# Patient Record
Sex: MALE | Race: WHITE | Employment: FULL TIME | URBAN - METROPOLITAN AREA
[De-identification: names, ages, dates, MRNs, and addresses within clinical notes are randomized per-mention and may not be internally consistent; named-entity substitution may affect disease eponyms.]

---

## 2017-01-06 ENCOUNTER — MYC MEDICAL ADVICE (OUTPATIENT)
Dept: FAMILY MEDICINE | Facility: CLINIC | Age: 42
End: 2017-01-06

## 2017-01-23 ENCOUNTER — TRANSFERRED RECORDS (OUTPATIENT)
Dept: HEALTH INFORMATION MANAGEMENT | Facility: CLINIC | Age: 42
End: 2017-01-23

## 2017-01-28 DIAGNOSIS — K59.00 CONSTIPATION, UNSPECIFIED CONSTIPATION TYPE: Primary | ICD-10-CM

## 2017-01-28 NOTE — TELEPHONE ENCOUNTER
polyethylene glycol (MIRALAX/GLYCOLAX) powder      Last Written Prescription Date: 1/1/16  Last Fill Quantity: 527g,  # refills: 1   Last Office Visit with [facility], [facility] or [facility] prescribing provider: 12/9/16

## 2017-01-31 RX ORDER — POLYETHYLENE GLYCOL 3350 17 G/17G
POWDER, FOR SOLUTION ORAL
Qty: 527 G | Refills: 0 | Status: SHIPPED | OUTPATIENT
Start: 2017-01-31 | End: 2017-11-06

## 2017-01-31 NOTE — TELEPHONE ENCOUNTER
Prescription approved per [facility] Refill Protocol.  Anne-Marie Metzger RN

## 2017-02-02 DIAGNOSIS — E11.9 TYPE 2 DIABETES MELLITUS WITHOUT COMPLICATION, WITHOUT LONG-TERM CURRENT USE OF INSULIN (H): Primary | ICD-10-CM

## 2017-02-02 NOTE — TELEPHONE ENCOUNTER
lisinopril (PRINIVIL/ZESTRIL) 2.5 MG tablet      Last Written Prescription Date: 12/19/16  Last Fill Quantity: 30, # refills: 1  Last Office Visit with G, P or [facility] prescribing provider: 12/9/16       POTASSIUM   Date Value Ref Range Status   12/08/2016 4.0 3.4 - 5.3 mmol/L Final     CREATININE   Date Value Ref Range Status   12/08/2016 0.90 0.66 - 1.25 mg/dL Final     BP Readings from Last 3 Encounters:   12/09/16 118/80   10/06/16 130/80   09/30/16 138/82

## 2017-02-06 RX ORDER — LISINOPRIL 2.5 MG/1
2.5 TABLET ORAL DAILY
Qty: 30 TABLET | Refills: 0 | Status: SHIPPED | OUTPATIENT
Start: 2017-02-06 | End: 2017-03-13

## 2017-02-06 NOTE — TELEPHONE ENCOUNTER
Medication is being filled for 1 time refill only due to:  Patient needs to be seen because due for physical in January 2017.   Paloma Hung RN

## 2017-03-07 ENCOUNTER — MYC MEDICAL ADVICE (OUTPATIENT)
Dept: FAMILY MEDICINE | Facility: CLINIC | Age: 42
End: 2017-03-07

## 2017-03-07 ENCOUNTER — OFFICE VISIT (OUTPATIENT)
Dept: FAMILY MEDICINE | Facility: CLINIC | Age: 42
End: 2017-03-07
Payer: COMMERCIAL

## 2017-03-07 VITALS
WEIGHT: 167.5 LBS | RESPIRATION RATE: 16 BRPM | HEART RATE: 84 BPM | BODY MASS INDEX: 26.92 KG/M2 | TEMPERATURE: 98.1 F | OXYGEN SATURATION: 98 % | HEIGHT: 66 IN | SYSTOLIC BLOOD PRESSURE: 126 MMHG | DIASTOLIC BLOOD PRESSURE: 76 MMHG

## 2017-03-07 DIAGNOSIS — H10.31 ACUTE CONJUNCTIVITIS OF RIGHT EYE, UNSPECIFIED ACUTE CONJUNCTIVITIS TYPE: Primary | ICD-10-CM

## 2017-03-07 PROCEDURE — 99213 OFFICE O/P EST LOW 20 MIN: CPT | Performed by: FAMILY MEDICINE

## 2017-03-07 RX ORDER — TOBRAMYCIN 3 MG/ML
1 SOLUTION/ DROPS OPHTHALMIC 4 TIMES DAILY
Qty: 5 ML | Refills: 0 | Status: SHIPPED | OUTPATIENT
Start: 2017-03-07 | End: 2017-03-20

## 2017-03-07 ASSESSMENT — PAIN SCALES - GENERAL: PAINLEVEL: NO PAIN (0)

## 2017-03-07 NOTE — PROGRESS NOTES
"  SUBJECTIVE:                                                    Fred Núñez is a 41 year old male who presents to clinic today for the following health issues:      Acute Illness   Acute illness concerns: R eye  Onset: this morning    Fever: no    Chills/Sweats: no    Headache (location?): no    Sinus Pressure:no    Conjunctivitis:  YES- R eye (itching, no redness/puss)    Ear Pain: no    Rhinorrhea: no    Congestion: no    Sore Throat: no     Cough: no    Wheeze: no    Decreased Appetite: no    Nausea: no    Vomiting: no    Diarrhea:  no    Dysuria/Freq.: no    Fatigue/Achiness: None    Sick/Strep Exposure: YES- wife last week, child yesterday were dx'd with pink eye     Therapies Tried and outcome: None    SUBJECTIVE:  Here today with the above. Both wife and daughter have been diagnosed with pinkeye in the last few days. Patient says he senses no cold symptoms, etc. coming on, but starting this morning his right eye has been feeling itchy and had a little bit of mattering. He is tried to keep his hands away so he doesn't worsen things.    Review of systems otherwise negative.  Past medical, family, and social history reviewed and updated in chart.    OBJECTIVE:  /76 (BP Location: Right arm, Patient Position: Right side, Cuff Size: Adult Regular)  Pulse 84  Temp 98.1  F (36.7  C) (Oral)  Resp 16  Ht 1.67 m (5' 5.75\")  Wt 76 kg (167 lb 8 oz)  SpO2 98%  BMI 27.24 kg/m2  Alert, pleasant, upbeat, and in no apparent discomfort.  Left eye normal in appearance  Right eye is slightly puffy and there is a slight pink tinge to the conjunctiva. Not a lot of mattering noted  Ears normal. Throat and pharynx normal. Neck supple. No adenopathy or masses in the neck or supraclavicular regions. Sinuses non tender.  S1 and S2 normal, no murmurs, clicks, gallops or rubs. Regular rate and rhythm. Chest is clear; no wheezes or rales. No edema or JVD.  Past labs reviewed with the patient.     ASSESSMENT / PLAN:  (H10.31) " Acute conjunctivitis of right eye, unspecified acute conjunctivitis type  (primary encounter diagnosis)  Comment: Discussed mechanism of action of the proposed medication, as well as potential effects, both good and bad.  Patient expressed understanding and agreed with treatment.   Plan: tobramycin (TOBREX) 0.3 % ophthalmic solution            Follow up as needed   BURAK Baldwin MD    (Chart documentation completed in part with Dragon voice-recognition software.  Even though reviewed some grammatical, spelling, and word errors may remain.)

## 2017-03-07 NOTE — MR AVS SNAPSHOT
After Visit Summary   3/7/2017    Fred Núñez    MRN: [id number]           Patient Information     Date Of Birth          1975        Visit Information        Provider Department      3/7/2017 11:40 AM Dolores Baldwin MD [facility]        Today's Diagnoses     Acute conjunctivitis of right eye, unspecified acute conjunctivitis type    -  1       Follow-ups after your visit        Follow-up notes from your care team     Return if symptoms worsen or fail to improve.      Who to contact     If you have questions or need follow up information about today's clinic visit or your schedule please contact [facility] directly at [phone].  Normal or non-critical lab and imaging results will be communicated to you by MyChart, letter or phone within 4 business days after the clinic has received the results. If you do not hear from us within 7 days, please contact the clinic through MyChart or phone. If you have a critical or abnormal lab result, we will notify you by phone as soon as possible.  Submit refill requests through [employer] or call your pharmacy and they will forward the refill request to us. Please allow 3 business days for your refill to be completed.          Additional Information About Your Visit        MyChart Information     [employer] gives you secure access to your electronic health record. If you see a primary care provider, you can also send messages to your care team and make appointments. If you have questions, please call your primary care clinic.  If you do not have a primary care provider, please call [phone] and they will assist you.        Care EveryWhere ID     This is your Care EveryWhere ID. This could be used by other organizations to access your [facility] medical records  [id number]        Your Vitals Were     Pulse Temperature Respirations Height Pulse Oximetry BMI (Body Mass Index)    84 98.1  F (36.7  C) (Oral) 16 1.67 m  "(5' 5.75\") 98% 27.24 kg/m2       Blood Pressure from Last 3 Encounters:   03/07/17 126/76   12/09/16 118/80   10/06/16 130/80    Weight from Last 3 Encounters:   03/07/17 76 kg (167 lb 8 oz)   12/09/16 76.7 kg (169 lb 1.6 oz)   11/02/16 79.6 kg (175 lb 8 oz)              Today, you had the following     No orders found for display         Today's Medication Changes          These changes are accurate as of: 3/7/17 12:12 PM.  If you have any questions, ask your nurse or doctor.               Start taking these medicines.        Dose/Directions    tobramycin 0.3 % ophthalmic solution   Commonly known as:  TOBREX   Used for:  Acute conjunctivitis of right eye, unspecified acute conjunctivitis type   Started by:  Dolores Baldwin MD        Dose:  1 drop   Place 1 drop into the right eye 4 times daily   Quantity:  5 mL   Refills:  0         Stop taking these medicines if you haven't already. Please contact your care team if you have questions.     fluticasone 50 MCG/ACT spray   Commonly known as:  FLONASE   Stopped by:  Dolores Baldwin MD                Where to get your medicines      These medications were sent to [facility]/pharmacy #[id number] - [place], [place] - [address] AT [address] [address]     Phone:  [phone]     tobramycin 0.3 % ophthalmic solution                Primary Care Provider Office Phone #    [facility] [phone]       No address on file        Thank you!     Thank you for choosing [facility]  for your care. Our goal is always to provide you with excellent care. Hearing back from our patients is one way we can continue to improve our services. Please take a few minutes to complete the written survey that you may receive in the mail after your visit with us. Thank you!             Your Updated Medication List - Protect others around you: Learn how to safely use, store and throw away your " medicines at www.disposemymeds.org.          This list is accurate as of: 3/7/17 12:12 PM.  Always use your most recent med list.                   Brand Name Dispense Instructions for use    amLODIPine 10 MG tablet    NORVASC    45 tablet    Take 0.5 tablets (5 mg) by mouth daily       atorvastatin 10 MG tablet    LIPITOR    90 tablet    TAKE 1 TABLET (10 MG) BY MOUTH AT BEDTIME       blood glucose monitoring lancets     2 Box    Use to test blood sugar 2 times daily or as directed.       blood glucose monitoring meter device kit     1 kit    Use to test blood sugars 2 times daily or as directed.       blood glucose monitoring test strip    ACCU-CHEK SMARTVIEW    2 Box    Use to test blood sugar 2 times daily or as directed.       lisinopril 2.5 MG tablet    PRINIVIL/Zestril    30 tablet    Take 1 tablet (2.5 mg) by mouth daily       Magnesium-Potassium 250-100 MG Tabs          metFORMIN 500 MG tablet    GLUCOPHAGE    180 tablet    Take 1 tablet (500 mg) by mouth 2 times daily (with meals)       Multi-vitamin Tabs tablet      Take 1 tablet by mouth daily       order for DME     1 each    Equipment being ordered: Blood pressure monitor. Check blood pressure every morning. Goal blood pressure: systolic less than 125; diastolic blood pressure less than 75.       polyethylene glycol powder    MIRALAX/GLYCOLAX    527 g    TAKE 17 G BY MOUTH DAILY       tobramycin 0.3 % ophthalmic solution    TOBREX    5 mL    Place 1 drop into the right eye 4 times daily       triamterene-hydrochlorothiazide 37.5-25 MG per capsule    DYAZIDE    90 capsule    Take 1 capsule by mouth daily

## 2017-03-07 NOTE — NURSING NOTE
"Chief Complaint   Patient presents with     Conjunctivitis       Initial /76 (BP Location: Right arm, Patient Position: Right side, Cuff Size: Adult Regular)  Pulse 84  Temp 98.1  F (36.7  C) (Oral)  Resp 16  Ht 1.67 m (5' 5.75\")  Wt 76 kg (167 lb 8 oz)  SpO2 98%  BMI 27.24 kg/m2 Estimated body mass index is 27.24 kg/(m^2) as calculated from the following:    Height as of this encounter: 1.67 m (5' 5.75\").    Weight as of this encounter: 76 kg (167 lb 8 oz).  Medication Reconciliation: complete     Will Girish WADDELL      "

## 2017-03-11 DIAGNOSIS — K59.00 CONSTIPATION, UNSPECIFIED CONSTIPATION TYPE: ICD-10-CM

## 2017-03-12 NOTE — TELEPHONE ENCOUNTER
polyethylene glycol       Last Written Prescription Date: 1/31/17  Last Fill Quantity: 527g,  # refills: 0   Last Office Visit with G, P or [facility] prescribing provider: 3/7/17

## 2017-03-13 DIAGNOSIS — E11.9 TYPE 2 DIABETES MELLITUS WITHOUT COMPLICATION, WITHOUT LONG-TERM CURRENT USE OF INSULIN (H): ICD-10-CM

## 2017-03-13 NOTE — TELEPHONE ENCOUNTER
lisinopril     Last Written Prescription Date: 02/06/17  Last Fill Quantity: 30, # refills: 0  Last Office Visit with [facility], [facility] or [facility] prescribing provider: 3/07/17 Dr. Baldwin      Potassium   Date Value Ref Range Status   12/08/2016 4.0 3.4 - 5.3 mmol/L Final     Creatinine   Date Value Ref Range Status   12/08/2016 0.90 0.66 - 1.25 mg/dL Final     BP Readings from Last 3 Encounters:   03/07/17 126/76   12/09/16 118/80   10/06/16 130/80     SANTIAGO Uribe (R)

## 2017-03-14 RX ORDER — POLYETHYLENE GLYCOL 3350 17 G/17G
POWDER, FOR SOLUTION ORAL
Qty: 527 G | Refills: 2 | Status: SHIPPED | OUTPATIENT
Start: 2017-03-14 | End: 2017-03-20

## 2017-03-14 RX ORDER — LISINOPRIL 2.5 MG/1
2.5 TABLET ORAL DAILY
Qty: 30 TABLET | Refills: 8 | Status: SHIPPED | OUTPATIENT
Start: 2017-03-14 | End: 2017-11-22

## 2017-03-14 NOTE — TELEPHONE ENCOUNTER
Prescription approved per [facility] Refill Protocol.  Anne-Marie Metzger RN

## 2017-03-20 ENCOUNTER — OFFICE VISIT (OUTPATIENT)
Dept: FAMILY MEDICINE | Facility: CLINIC | Age: 42
End: 2017-03-20
Payer: COMMERCIAL

## 2017-03-20 VITALS
DIASTOLIC BLOOD PRESSURE: 78 MMHG | HEIGHT: 66 IN | BODY MASS INDEX: 26.89 KG/M2 | HEART RATE: 82 BPM | OXYGEN SATURATION: 97 % | RESPIRATION RATE: 16 BRPM | TEMPERATURE: 97.8 F | WEIGHT: 167.3 LBS | SYSTOLIC BLOOD PRESSURE: 116 MMHG

## 2017-03-20 DIAGNOSIS — J01.90 ACUTE NON-RECURRENT SINUSITIS, UNSPECIFIED LOCATION: Primary | ICD-10-CM

## 2017-03-20 PROCEDURE — 99213 OFFICE O/P EST LOW 20 MIN: CPT | Performed by: FAMILY MEDICINE

## 2017-03-20 RX ORDER — AMOXICILLIN 875 MG
875 TABLET ORAL 2 TIMES DAILY
Qty: 20 TABLET | Refills: 0 | Status: SHIPPED | OUTPATIENT
Start: 2017-03-20 | End: 2017-11-06

## 2017-03-20 ASSESSMENT — PAIN SCALES - GENERAL: PAINLEVEL: NO PAIN (0)

## 2017-03-20 NOTE — NURSING NOTE
"Chief Complaint   Patient presents with     Sinus Problem       Initial /78 (BP Location: Right arm, Patient Position: Right side, Cuff Size: Adult Regular)  Pulse 82  Temp 97.8  F (36.6  C) (Oral)  Resp 16  Ht 1.67 m (5' 5.75\")  Wt 75.9 kg (167 lb 4.8 oz)  SpO2 97%  BMI 27.21 kg/m2 Estimated body mass index is 27.21 kg/(m^2) as calculated from the following:    Height as of this encounter: 1.67 m (5' 5.75\").    Weight as of this encounter: 75.9 kg (167 lb 4.8 oz).  Medication Reconciliation: complete     Will Girish WADDELL      "

## 2017-03-20 NOTE — PROGRESS NOTES
"  SUBJECTIVE:                                                    Fred Núñez is a 41 year old male who presents to clinic today for the following health issues:      Acute Illness   Acute illness concerns: sinus, congestion  Onset: 1 week    Fever: YES    Chills/Sweats: no    Headache (location?): YES    Sinus Pressure:YES    Conjunctivitis:  no    Ear Pain: no    Rhinorrhea: YES    Congestion: YES    Sore Throat: YES- Fri,Sat     Cough: YES-non-productive    Wheeze: no    Decreased Appetite: YES    Nausea: no    Vomiting: no    Diarrhea:  no    Dysuria/Freq.: no    Fatigue/Achiness: no     Sick/Strep Exposure: YES- child - recently joined , wife has bronchitis     Therapies Tried and outcome: Ibuprofen, Cough drops -    SUBJECTIVE:  Here today with the above symptoms that started a week ago.  Wife and daughter both on amoxicillin (ear, sinus).  Fever initially - not now.  Lot's of sinus pressure, postnasal drip, cough.    Review of systems otherwise negative.  Past medical, family, and social history reviewed and updated in chart.    OBJECTIVE:  /78 (BP Location: Right arm, Patient Position: Right side, Cuff Size: Adult Regular)  Pulse 82  Temp 97.8  F (36.6  C) (Oral)  Resp 16  Ht 1.67 m (5' 5.75\")  Wt 75.9 kg (167 lb 4.8 oz)  SpO2 97%  BMI 27.21 kg/m2  Alert, pleasant, upbeat, and in no apparent discomfort.  Ears normal. Throat and pharynx normal. Neck supple. No adenopathy or masses in the neck or supraclavicular regions. Sinuses non tender.  S1 and S2 normal, no murmurs, clicks, gallops or rubs. Regular rate and rhythm. Chest is clear; no wheezes or rales. No edema or JVD.   Past labs reviewed with the patient.     ASSESSMENT / PLAN:  (J01.90) Acute non-recurrent sinusitis, unspecified location  (primary encounter diagnosis)  Comment: Discussed mechanism of action of the proposed medication, as well as potential effects, both good and bad.  Patient expressed understanding and agreed with " treatment.   Plan: amoxicillin (AMOXIL) 875 MG tablet            Follow up as needed   S. Andrez Baldwin MD    (Chart documentation completed in part with Dragon voice-recognition software.  Even though reviewed some grammatical, spelling, and word errors may remain.)

## 2017-03-20 NOTE — MR AVS SNAPSHOT
"              After Visit Summary   3/20/2017    Fred Núñez    MRN: [id number]           Patient Information     Date Of Birth          1975        Visit Information        Provider Department      3/20/2017 1:00 PM Dolores Baldwin MD [facility]        Today's Diagnoses     Acute non-recurrent sinusitis, unspecified location    -  1       Follow-ups after your visit        Follow-up notes from your care team     Return if symptoms worsen or fail to improve.      Who to contact     If you have questions or need follow up information about today's clinic visit or your schedule please contact [facility] directly at [phone].  Normal or non-critical lab and imaging results will be communicated to you by MyChart, letter or phone within 4 business days after the clinic has received the results. If you do not hear from us within 7 days, please contact the clinic through [employer] or phone. If you have a critical or abnormal lab result, we will notify you by phone as soon as possible.  Submit refill requests through [employer] or call your pharmacy and they will forward the refill request to us. Please allow 3 business days for your refill to be completed.          Additional Information About Your Visit        MyChart Information     [employer] gives you secure access to your electronic health record. If you see a primary care provider, you can also send messages to your care team and make appointments. If you have questions, please call your primary care clinic.  If you do not have a primary care provider, please call [phone] and they will assist you.        Care EveryWhere ID     This is your Care EveryWhere ID. This could be used by other organizations to access your [facility] medical records  [id number]        Your Vitals Were     Pulse Temperature Respirations Height Pulse Oximetry BMI (Body Mass Index)    82 97.8  F (36.6  C) (Oral) 16 1.67 m (5' 5.75\") 97% 27.21 " kg/m2       Blood Pressure from Last 3 Encounters:   03/20/17 116/78   03/07/17 126/76   12/09/16 118/80    Weight from Last 3 Encounters:   03/20/17 75.9 kg (167 lb 4.8 oz)   03/07/17 76 kg (167 lb 8 oz)   12/09/16 76.7 kg (169 lb 1.6 oz)              Today, you had the following     No orders found for display         Today's Medication Changes          These changes are accurate as of: 3/20/17  1:23 PM.  If you have any questions, ask your nurse or doctor.               Start taking these medicines.        Dose/Directions    amoxicillin 875 MG tablet   Commonly known as:  AMOXIL   Used for:  Acute non-recurrent sinusitis, unspecified location   Started by:  Dolores Baldwin MD        Dose:  875 mg   Take 1 tablet (875 mg) by mouth 2 times daily   Quantity:  20 tablet   Refills:  0            Where to get your medicines      These medications were sent to [facility]/pharmacy #[id number] - [place] [id number]     Phone:  [phone]     amoxicillin 875 MG tablet                Primary Care Provider Office Phone # Fax #    Dolores Baldwin -[phone]       [address] [address]        Thank you!     Thank you for choosing [facility]  for your care. Our goal is always to provide you with excellent care. Hearing back from our patients is one way we can continue to improve our services. Please take a few minutes to complete the written survey that you may receive in the mail after your visit with us. Thank you!             Your Updated Medication List - Protect others around you: Learn how to safely use, store and throw away your medicines at www.disposemymeds.org.          This list is accurate as of: 3/20/17  1:23 PM.  Always use your most recent med list.                   Brand Name Dispense Instructions for use    amLODIPine 10 MG  tablet    NORVASC    45 tablet    Take 0.5 tablets (5 mg) by mouth daily       amoxicillin 875 MG tablet    AMOXIL    20 tablet    Take 1 tablet (875 mg) by mouth 2 times daily       atorvastatin 10 MG tablet    LIPITOR    90 tablet    TAKE 1 TABLET (10 MG) BY MOUTH AT BEDTIME       blood glucose monitoring lancets     2 Box    Use to test blood sugar 2 times daily or as directed.       blood glucose monitoring meter device kit     1 kit    Use to test blood sugars 2 times daily or as directed.       blood glucose monitoring test strip    ACCU-CHEK SMARTVIEW    2 Box    Use to test blood sugar 2 times daily or as directed.       lisinopril 2.5 MG tablet    PRINIVIL/Zestril    30 tablet    Take 1 tablet (2.5 mg) by mouth daily       Magnesium-Potassium 250-100 MG Tabs          metFORMIN 500 MG tablet    GLUCOPHAGE    180 tablet    Take 1 tablet (500 mg) by mouth 2 times daily (with meals)       Multi-vitamin Tabs tablet      Take 1 tablet by mouth daily       order for DME     1 each    Equipment being ordered: Blood pressure monitor. Check blood pressure every morning. Goal blood pressure: systolic less than 125; diastolic blood pressure less than 75.       polyethylene glycol powder    MIRALAX/GLYCOLAX    527 g    TAKE 17 G BY MOUTH DAILY       triamterene-hydrochlorothiazide 37.5-25 MG per capsule    DYAZIDE    90 capsule    Take 1 capsule by mouth daily

## 2017-04-10 DIAGNOSIS — E11.9 DM TYPE 2, GOAL HBA1C < 7% (H): ICD-10-CM

## 2017-04-10 DIAGNOSIS — E78.5 HYPERLIPIDEMIA LDL GOAL <100: ICD-10-CM

## 2017-04-10 NOTE — TELEPHONE ENCOUNTER
blood glucose monitoring (ACCU-CHEK SMARTVIEW) test strip         Last Written Prescription Date: 10/10/16  Last Fill Quantity: 2 boxes, # refills: PRN  Last Office Visit with G, [facility] or [facility] prescribing provider:  3/20/17 Daily Pedro          BP Readings from Last 3 Encounters:   03/20/17 116/78   03/07/17 126/76   12/09/16 118/80     Lab Results   Component Value Date    MICROL 7 09/30/2016     Lab Results   Component Value Date    UMALCR 3.74 09/30/2016     Creatinine   Date Value Ref Range Status   12/08/2016 0.90 0.66 - 1.25 mg/dL Final   ]  GFR Estimate   Date Value Ref Range Status   12/08/2016 >90  Non  GFR Calc   >60 mL/min/1.7m2 Final   09/30/2016 81 >60 mL/min/1.7m2 Final     Comment:     Non  GFR Calc   09/18/2015 70 >60 mL/min/1.7m2 Final     Comment:     Non  GFR Calc     GFR Estimate If Black   Date Value Ref Range Status   12/08/2016 >90   GFR Calc   >60 mL/min/1.7m2 Final   09/30/2016 >90   GFR Calc   >60 mL/min/1.7m2 Final   09/18/2015 84 >60 mL/min/1.7m2 Final     Comment:      GFR Calc     Lab Results   Component Value Date    CHOL 117 12/08/2016     Lab Results   Component Value Date    HDL 40 12/08/2016     Lab Results   Component Value Date    LDL 45 12/08/2016     Lab Results   Component Value Date    TRIG 160 12/08/2016     No results found for: CHOLHDLRATIO  Lab Results   Component Value Date    AST 32 12/08/2016     Lab Results   Component Value Date    ALT 75 12/08/2016     Lab Results   Component Value Date    A1C 6.0 12/08/2016    A1C 7.4 09/30/2016     Potassium   Date Value Ref Range Status   12/08/2016 4.0 3.4 - 5.3 mmol/L Final       Michelle Wang

## 2017-04-11 ENCOUNTER — MYC MEDICAL ADVICE (OUTPATIENT)
Dept: PHYSICAL THERAPY | Facility: CLINIC | Age: 42
End: 2017-04-11

## 2017-04-11 ENCOUNTER — MYC MEDICAL ADVICE (OUTPATIENT)
Dept: FAMILY MEDICINE | Facility: CLINIC | Age: 42
End: 2017-04-11

## 2017-04-11 DIAGNOSIS — I10 ESSENTIAL HYPERTENSION WITH GOAL BLOOD PRESSURE LESS THAN 140/90: ICD-10-CM

## 2017-04-11 DIAGNOSIS — E11.9 TYPE 2 DIABETES MELLITUS WITHOUT COMPLICATION, WITHOUT LONG-TERM CURRENT USE OF INSULIN (H): Primary | ICD-10-CM

## 2017-04-11 DIAGNOSIS — E78.5 HYPERLIPIDEMIA LDL GOAL <100: ICD-10-CM

## 2017-04-11 DIAGNOSIS — E11.9 DM TYPE 2, GOAL HBA1C < 7% (H): ICD-10-CM

## 2017-04-11 RX ORDER — TRIAMTERENE AND HYDROCHLOROTHIAZIDE 37.5; 25 MG/1; MG/1
1 CAPSULE ORAL DAILY
Qty: 90 CAPSULE | Refills: 1 | Status: SHIPPED | OUTPATIENT
Start: 2017-04-11 | End: 2017-09-04

## 2017-04-11 RX ORDER — ATORVASTATIN CALCIUM 10 MG/1
TABLET, FILM COATED ORAL
Qty: 90 TABLET | Refills: 1 | Status: SHIPPED | OUTPATIENT
Start: 2017-04-11 | End: 2017-09-04

## 2017-04-11 RX ORDER — AMLODIPINE BESYLATE 10 MG/1
5 TABLET ORAL DAILY
Qty: 45 TABLET | Refills: 1 | Status: SHIPPED | OUTPATIENT
Start: 2017-04-11 | End: 2017-09-04

## 2017-04-11 NOTE — TELEPHONE ENCOUNTER
Prescription approved per [facility] Refill Protocol.  RICCO Haley, Clinical RN Idniana Larsen.

## 2017-04-18 RX ORDER — BLOOD-GLUCOSE METER
EACH MISCELLANEOUS
Qty: 1 KIT | Refills: 0 | Status: SHIPPED | OUTPATIENT
Start: 2017-04-18 | End: 2022-07-01

## 2017-04-18 NOTE — TELEPHONE ENCOUNTER
Prescription approved per [facility] [place]ill [place].  Arlen Castaneda RN

## 2017-04-18 NOTE — TELEPHONE ENCOUNTER
blood glucose monitoring (ONE TOUCH ULTRA 2) meter device kit         Last Written Prescription Date: 10/12/16  Last Fill Quantity: 1 kit, # refills: 0  Last Office Visit with [facility], [facility] or [facility] prescribing provider:  3/20/17 Dr. Baldwin          BP Readings from Last 3 Encounters:   03/20/17 116/78   03/07/17 126/76   12/09/16 118/80     Lab Results   Component Value Date    MICROL 7 09/30/2016     Lab Results   Component Value Date    UMALCR 3.74 09/30/2016     Creatinine   Date Value Ref Range Status   12/08/2016 0.90 0.66 - 1.25 mg/dL Final   ]  GFR Estimate   Date Value Ref Range Status   12/08/2016 >90  Non  GFR Calc   >60 mL/min/1.7m2 Final   09/30/2016 81 >60 mL/min/1.7m2 Final     Comment:     Non  GFR Calc   09/18/2015 70 >60 mL/min/1.7m2 Final     Comment:     Non  GFR Calc     GFR Estimate If Black   Date Value Ref Range Status   12/08/2016 >90   GFR Calc   >60 mL/min/1.7m2 Final   09/30/2016 >90   GFR Calc   >60 mL/min/1.7m2 Final   09/18/2015 84 >60 mL/min/1.7m2 Final     Comment:      GFR Calc     Lab Results   Component Value Date    CHOL 117 12/08/2016     Lab Results   Component Value Date    HDL 40 12/08/2016     Lab Results   Component Value Date    LDL 45 12/08/2016     Lab Results   Component Value Date    TRIG 160 12/08/2016     No results found for: CHOLHDLRATIO  Lab Results   Component Value Date    AST 32 12/08/2016     Lab Results   Component Value Date    ALT 75 12/08/2016     Lab Results   Component Value Date    A1C 6.0 12/08/2016    A1C 7.4 09/30/2016     Potassium   Date Value Ref Range Status   12/08/2016 4.0 3.4 - 5.3 mmol/L Final     Mallika YU)

## 2017-04-27 ENCOUNTER — MYC MEDICAL ADVICE (OUTPATIENT)
Dept: FAMILY MEDICINE | Facility: CLINIC | Age: 42
End: 2017-04-27

## 2017-05-01 DIAGNOSIS — E11.9 DM TYPE 2, GOAL HBA1C < 7% (H): ICD-10-CM

## 2017-05-01 DIAGNOSIS — E78.5 HYPERLIPIDEMIA LDL GOAL <100: ICD-10-CM

## 2017-05-01 NOTE — TELEPHONE ENCOUNTER
blood glucose monitoring (ACCU-CHEK SMARTVIEW) test strip         Last Written Prescription Date: 4/11/17  Last Fill Quantity: 2 boxes, # refills: 1  Last Office Visit with G, [facility] or [facility] prescribing provider:  3/20/17 Dr. Baldwin          BP Readings from Last 3 Encounters:   03/20/17 116/78   03/07/17 126/76   12/09/16 118/80     Lab Results   Component Value Date    MICROL 7 09/30/2016     Lab Results   Component Value Date    UMALCR 3.74 09/30/2016     Creatinine   Date Value Ref Range Status   12/08/2016 0.90 0.66 - 1.25 mg/dL Final   ]  GFR Estimate   Date Value Ref Range Status   12/08/2016 >90  Non  GFR Calc   >60 mL/min/1.7m2 Final   09/30/2016 81 >60 mL/min/1.7m2 Final     Comment:     Non  GFR Calc   09/18/2015 70 >60 mL/min/1.7m2 Final     Comment:     Non  GFR Calc     GFR Estimate If Black   Date Value Ref Range Status   12/08/2016 >90   GFR Calc   >60 mL/min/1.7m2 Final   09/30/2016 >90   GFR Calc   >60 mL/min/1.7m2 Final   09/18/2015 84 >60 mL/min/1.7m2 Final     Comment:      GFR Calc     Lab Results   Component Value Date    CHOL 117 12/08/2016     Lab Results   Component Value Date    HDL 40 12/08/2016     Lab Results   Component Value Date    LDL 45 12/08/2016     Lab Results   Component Value Date    TRIG 160 12/08/2016     No results found for: CHOLHDLRATIO  Lab Results   Component Value Date    AST 32 12/08/2016     Lab Results   Component Value Date    ALT 75 12/08/2016     Lab Results   Component Value Date    A1C 6.0 12/08/2016    A1C 7.4 09/30/2016     Potassium   Date Value Ref Range Status   12/08/2016 4.0 3.4 - 5.3 mmol/L Final       Michelle Wang

## 2017-05-03 NOTE — TELEPHONE ENCOUNTER
The following prescription was sent to [place] :    blood glucose monitoring (ACCU-CHEK SMARTVIEW) test strip 2 Box 1 4/11/2017  No   Sig: Use to test blood sugar 2 times daily or as directed.   Class: E-Prescribe   Order: [id number]   E-Prescribing Status: Receipt confirmed by pharmacy (4/11/2017  5:46 PM CDT)     Request denied.  Too soon to fill.    Anne-Marie Metzger RN

## 2017-05-30 ENCOUNTER — OFFICE VISIT (OUTPATIENT)
Dept: FAMILY MEDICINE | Facility: CLINIC | Age: 42
End: 2017-05-30
Payer: COMMERCIAL

## 2017-05-30 VITALS
RESPIRATION RATE: 16 BRPM | HEART RATE: 88 BPM | OXYGEN SATURATION: 98 % | DIASTOLIC BLOOD PRESSURE: 76 MMHG | HEIGHT: 66 IN | BODY MASS INDEX: 26.66 KG/M2 | TEMPERATURE: 97.7 F | WEIGHT: 165.9 LBS | SYSTOLIC BLOOD PRESSURE: 134 MMHG

## 2017-05-30 DIAGNOSIS — S91.219A: Primary | ICD-10-CM

## 2017-05-30 DIAGNOSIS — R10.31 ABDOMINAL PAIN, RIGHT LOWER QUADRANT: ICD-10-CM

## 2017-05-30 PROCEDURE — 99213 OFFICE O/P EST LOW 20 MIN: CPT | Performed by: FAMILY MEDICINE

## 2017-05-30 ASSESSMENT — PAIN SCALES - GENERAL: PAINLEVEL: NO PAIN (0)

## 2017-05-30 NOTE — NURSING NOTE
"Chief Complaint   Patient presents with     Toe Injury     R big toe       Initial /76 (BP Location: Right arm, Patient Position: Right side, Cuff Size: Adult Regular)  Pulse 88  Temp 97.7  F (36.5  C) (Oral)  Resp 16  Ht 1.67 m (5' 5.75\")  Wt 75.3 kg (165 lb 14.4 oz)  SpO2 98%  BMI 26.98 kg/m2 Estimated body mass index is 26.98 kg/(m^2) as calculated from the following:    Height as of this encounter: 1.67 m (5' 5.75\").    Weight as of this encounter: 75.3 kg (165 lb 14.4 oz).  Medication Reconciliation: complete     Will Girish WADDELL      "

## 2017-05-30 NOTE — PROGRESS NOTES
"  SUBJECTIVE:                                                    Fred Núñez is a 41 year old male who presents to clinic today for the following health issues:    1. Intermittent abdominal pain - (can be a few weeks in between) - belt line, R side    Concern - toe pain - R big toe     Onset: stubbed toe 6 weeks ago    Description:   Pain with pressure, black under nail    Intensity: moderate    Progression of Symptoms:  Intermittent - has improved    Accompanying Signs & Symptoms:  None       Previous history of similar problem:   None    Precipitating factors:   Worsened by: pressure, movement    Alleviating factors:  Improved by: NA       Therapies Tried and outcome: NA    SUBJECTIVE:  Here today with the above symptoms. He was running up the stairs about 6 weeks ago he kicked his right great toe into the edge of a step. Immediate pain that lasted for 3-4 days but that part has gotten better. Initially not black and blue under the nail, that developed a little bit later. Has felt a little bit of pressure for the most part that has gone as well and he just wants to make sure this is okay. Also notes intermittent pain in his right lower quadrant. Can't think of any particular exacerbating factor and is not really certain if it's positional or related to something internal - offhand does not seem to be associated with bowel movements, gas, etc. Short-lived and he just wants to make sure it's nothing serious    Review of systems otherwise negative.  Past medical, family, and social history reviewed and updated in chart.    OBJECTIVE:  /76 (BP Location: Right arm, Patient Position: Right side, Cuff Size: Adult Regular)  Pulse 88  Temp 97.7  F (36.5  C) (Oral)  Resp 16  Ht 1.67 m (5' 5.75\")  Wt 75.3 kg (165 lb 14.4 oz)  SpO2 98%  BMI 26.98 kg/m2  Alert, pleasant, upbeat, and in no apparent discomfort.  S1 and S2 normal, no murmurs, clicks, gallops or rubs. Regular rate and rhythm. Chest is clear; no " wheezes or rales. No edema or JVD.  The abdomen is soft without tenderness, guarding, mass, rebound or organomegaly. Bowel sounds are normal. No CVA tenderness or inguinal adenopathy noted.  Right foot - his entire right great toenail has old dark blood underneath. The edge of the nails coming up at the base there appears to be a nail beneath this. No surrounding erythema  Past labs reviewed with the patient.     ASSESSMENT / PLAN:  (S98.219A) Nailbed laceration, toe  (primary encounter diagnosis)  Comment: discussed with patient that there is nothing he needs to do at this point and nothing that can really be done. A new nail is growing underneath this and his top nail is stained from the blood will likely fall off in 3-6 months. We don't know is if there will be a problem with adherence with the new nail because of the damage from the previous.  Plan:     (R10.31) Abdominal pain, right lower quadrant  Comment: seemingly mechanical - he will keep an eye on this for now  Plan:     Follow up as needed   S. Andrez Baldwin MD    (Chart documentation completed in part with Dragon voice-recognition software.  Even though reviewed some grammatical, spelling, and word errors may remain.)

## 2017-05-30 NOTE — MR AVS SNAPSHOT
After Visit Summary   5/30/2017    Fred Núñez    MRN: [id number]           Patient Information     Date Of Birth          1975        Visit Information        Provider Department      5/30/2017 1:40 PM Dolores Baldwin MD [facility]        Today's Diagnoses     Nailbed laceration, toe    -  1    Abdominal pain, right lower quadrant           Follow-ups after your visit        Follow-up notes from your care team     Return if symptoms worsen or fail to improve.      Who to contact     If you have questions or need follow up information about today's clinic visit or your schedule please contact [facility] directly at [phone].  Normal or non-critical lab and imaging results will be communicated to you by MyChart, letter or phone within 4 business days after the clinic has received the results. If you do not hear from us within 7 days, please contact the clinic through [employer] or phone. If you have a critical or abnormal lab result, we will notify you by phone as soon as possible.  Submit refill requests through [employer] or call your pharmacy and they will forward the refill request to us. Please allow 3 business days for your refill to be completed.          Additional Information About Your Visit        MyChart Information     [employer] gives you secure access to your electronic health record. If you see a primary care provider, you can also send messages to your care team and make appointments. If you have questions, please call your primary care clinic.  If you do not have a primary care provider, please call [phone] and they will assist you.        Care EveryWhere ID     This is your Care EveryWhere ID. This could be used by other organizations to access your [place] medical records  [id number]        Your Vitals Were     Pulse Temperature Respirations Height Pulse Oximetry BMI (Body Mass Index)    88 97.7  F (36.5  C) (Oral) 16 1.67 m (5'  "5.75\") 98% 26.98 kg/m2       Blood Pressure from Last 3 Encounters:   05/30/17 134/76   03/20/17 116/78   03/07/17 126/76    Weight from Last 3 Encounters:   05/30/17 75.3 kg (165 lb 14.4 oz)   03/20/17 75.9 kg (167 lb 4.8 oz)   03/07/17 76 kg (167 lb 8 oz)              Today, you had the following     No orders found for display       Primary Care Provider Office Phone # Fax #    Dolores Andrez Baldwin [phone]       [address] [address]        Thank you!     Thank you for choosing [facility]  for your care. Our goal is always to provide you with excellent care. Hearing back from our patients is one way we can continue to improve our services. Please take a few minutes to complete the written survey that you may receive in the mail after your visit with us. Thank you!             Your Updated Medication List - Protect others around you: Learn how to safely use, store and throw away your medicines at www.disposemymeds.org.          This list is accurate as of: 5/30/17  3:30 PM.  Always use your most recent med list.                   Brand Name Dispense Instructions for use    amLODIPine 10 MG tablet    NORVASC    45 tablet    Take 0.5 tablets (5 mg) by mouth daily       amoxicillin 875 MG tablet    AMOXIL    20 tablet    Take 1 tablet (875 mg) by mouth 2 times daily       atorvastatin 10 MG tablet    LIPITOR    90 tablet    TAKE 1 TABLET (10 MG) BY MOUTH AT BEDTIME       blood glucose monitoring lancets     2 Box    Use to test blood sugar 2 times daily or as directed.       blood glucose monitoring meter device kit     1 kit    Use to test blood sugars 2 times daily or as directed.       blood glucose monitoring test strip    ACCU-CHEK SMARTVIEW    2 Box    Use to test blood sugar 2 times daily or as directed.       lisinopril 2.5 MG tablet    PRINIVIL/Zestril    30 tablet    Take 1 tablet (2.5 mg) by mouth daily       " Magnesium-Potassium 250-100 MG Tabs          * metFORMIN 500 MG tablet    GLUCOPHAGE    60 tablet    Take 1 tablet (500 mg) by mouth 2 times daily (with meals)       * metFORMIN 500 MG tablet    GLUCOPHAGE    180 tablet    Take 1 tablet (500 mg) by mouth 2 times daily (with meals)       Multi-vitamin Tabs tablet      Take 1 tablet by mouth daily       order for DME     1 each    Equipment being ordered: Blood pressure monitor. Check blood pressure every morning. Goal blood pressure: systolic less than 125; diastolic blood pressure less than 75.       polyethylene glycol powder    MIRALAX/GLYCOLAX    527 g    TAKE 17 G BY MOUTH DAILY       triamterene-hydrochlorothiazide 37.5-25 MG per capsule    DYAZIDE    90 capsule    Take 1 capsule by mouth daily       * Notice:  This list has 2 medication(s) that are the same as other medications prescribed for you. Read the directions carefully, and ask your doctor or other care provider to review them with you.

## 2017-06-05 DIAGNOSIS — K59.00 CONSTIPATION, UNSPECIFIED CONSTIPATION TYPE: ICD-10-CM

## 2017-06-05 NOTE — TELEPHONE ENCOUNTER
polyethylene glycol (MIRALAX/GLYCOLAX) powder      Last Written Prescription Date: 01/31/17  Last Fill Quantity: 527g,  # refills: 0   Last Office Visit with [facility], [facility] or [facility] prescribing provider: 05/30/17      [facility]

## 2017-06-08 RX ORDER — POLYETHYLENE GLYCOL 3350 17 G/17G
POWDER, FOR SOLUTION ORAL
Qty: 527 G | Refills: 1 | Status: SHIPPED | OUTPATIENT
Start: 2017-06-08 | End: 2017-08-03

## 2017-06-08 NOTE — TELEPHONE ENCOUNTER
Prescription approved per [facility]ill Protocol.  Paloma Hung RN

## 2017-08-03 DIAGNOSIS — K59.00 CONSTIPATION, UNSPECIFIED CONSTIPATION TYPE: ICD-10-CM

## 2017-08-03 NOTE — TELEPHONE ENCOUNTER
polyethylene glycol (MIRALAX/GLYCOLAX) powder      Last Written Prescription Date: 06/08/17  Last Fill Quantity: 527g,  # refills: 1   Last Office Visit with [facility], [facility] or [facility] prescribing provider: 05/30/17      [facility]

## 2017-08-06 RX ORDER — POLYETHYLENE GLYCOL 3350 17 G/17G
POWDER, FOR SOLUTION ORAL
Qty: 527 G | Refills: 0 | Status: SHIPPED | OUTPATIENT
Start: 2017-08-06 | End: 2017-09-06

## 2017-08-06 NOTE — TELEPHONE ENCOUNTER
Medication is being filled for 1 time refill only. Patient needs to be seen for office visit / diabetes labs for further refills.    Tesfaye Duncan RN

## 2017-09-04 DIAGNOSIS — I10 ESSENTIAL HYPERTENSION WITH GOAL BLOOD PRESSURE LESS THAN 140/90: ICD-10-CM

## 2017-09-04 DIAGNOSIS — E78.5 HYPERLIPIDEMIA LDL GOAL <100: ICD-10-CM

## 2017-09-06 DIAGNOSIS — K59.00 CONSTIPATION, UNSPECIFIED CONSTIPATION TYPE: ICD-10-CM

## 2017-09-06 NOTE — TELEPHONE ENCOUNTER
polyethylene glycol      Last Written Prescription Date: 8/6/17  Last Fill Quantity: 527g,  # refills: 0   Last Office Visit with G, P or [facility] prescribing provider: 5/30/17

## 2017-09-07 RX ORDER — AMLODIPINE BESYLATE 10 MG/1
TABLET ORAL
Qty: 45 TABLET | Refills: 0 | Status: SHIPPED | OUTPATIENT
Start: 2017-09-07 | End: 2017-11-22

## 2017-09-07 RX ORDER — TRIAMTERENE AND HYDROCHLOROTHIAZIDE 37.5; 25 MG/1; MG/1
CAPSULE ORAL
Qty: 90 CAPSULE | Refills: 0 | Status: SHIPPED | OUTPATIENT
Start: 2017-09-07 | End: 2017-11-22

## 2017-09-07 RX ORDER — POLYETHYLENE GLYCOL 3350 17 G/17G
POWDER, FOR SOLUTION ORAL
Qty: 527 G | Refills: 2 | Status: SHIPPED | OUTPATIENT
Start: 2017-09-07 | End: 2017-12-10

## 2017-09-07 RX ORDER — ATORVASTATIN CALCIUM 10 MG/1
TABLET, FILM COATED ORAL
Qty: 90 TABLET | Refills: 0 | Status: SHIPPED | OUTPATIENT
Start: 2017-09-07 | End: 2017-11-22

## 2017-10-23 DIAGNOSIS — E11.9 TYPE 2 DIABETES MELLITUS WITHOUT COMPLICATION, WITHOUT LONG-TERM CURRENT USE OF INSULIN (H): Primary | ICD-10-CM

## 2017-11-06 ENCOUNTER — OFFICE VISIT (OUTPATIENT)
Dept: FAMILY MEDICINE | Facility: CLINIC | Age: 42
End: 2017-11-06
Payer: COMMERCIAL

## 2017-11-06 VITALS
OXYGEN SATURATION: 98 % | DIASTOLIC BLOOD PRESSURE: 68 MMHG | BODY MASS INDEX: 25.81 KG/M2 | WEIGHT: 158.7 LBS | TEMPERATURE: 98.1 F | SYSTOLIC BLOOD PRESSURE: 136 MMHG | HEART RATE: 101 BPM

## 2017-11-06 DIAGNOSIS — B37.2 YEAST INFECTION OF THE SKIN: Primary | ICD-10-CM

## 2017-11-06 DIAGNOSIS — Z23 NEED FOR PROPHYLACTIC VACCINATION AND INOCULATION AGAINST INFLUENZA: ICD-10-CM

## 2017-11-06 PROCEDURE — 90471 IMMUNIZATION ADMIN: CPT | Performed by: FAMILY MEDICINE

## 2017-11-06 PROCEDURE — 90686 IIV4 VACC NO PRSV 0.5 ML IM: CPT | Performed by: FAMILY MEDICINE

## 2017-11-06 PROCEDURE — 99213 OFFICE O/P EST LOW 20 MIN: CPT | Mod: 25 | Performed by: FAMILY MEDICINE

## 2017-11-06 RX ORDER — CLOTRIMAZOLE AND BETAMETHASONE DIPROPIONATE 10; .64 MG/G; MG/G
CREAM TOPICAL 2 TIMES DAILY
Qty: 30 G | Refills: 1 | Status: SHIPPED | OUTPATIENT
Start: 2017-11-06 | End: 2018-05-29

## 2017-11-06 NOTE — MR AVS SNAPSHOT
After Visit Summary   11/6/2017    Fred Núñez    MRN: [id number]           Patient Information     Date Of Birth          1975        Visit Information        Provider Department      11/6/2017 11:20 AM Eli Saul MD [facility]        Today's Diagnoses     Yeast infection of the skin    -  1    Need for prophylactic vaccination and inoculation against influenza          Care Instructions    Try Using spray-on Deoderant instead of roll-on Deoderant    Wear loose clothing for exposure to air    Apply Lotrisone cream to the affected area twice daily for 14 days     Flu shot today    Labs next week           Follow-ups after your visit        Your next 10 appointments already scheduled     Nov 16, 2017  7:00 AM CST   [facility] with [facility] ONLY   [facility]    [address] [place] [id number] [phone]           Please do not eat 10-12 hours before your appointment if you are coming in fasting for labs on lipids, cholesterol, or glucose (sugar). This does not apply to pregnant women. Water, hot tea and black coffee (with nothing added) are okay. Do not drink other fluids, diet soda or chew gum.              Who to contact     If you have questions or need follow up information about today's clinic visit or your schedule please contact [facility] directly at [phone].  Normal or non-critical lab and imaging results will be communicated to you by MyChart, letter or phone within 4 business days after the clinic has received the results. If you do not hear from us within 7 days, please contact the clinic through MyChart or phone. If you have a critical or abnormal lab result, we will notify you by phone as soon as possible.  Submit refill requests through [employer] or call your pharmacy and they will forward the refill request to us. Please allow 3 business days for your refill to be completed.           Additional Information About Your Visit        MyChart Information     [employer] gives you secure access to your electronic health record. If you see a primary care provider, you can also send messages to your care team and make appointments. If you have questions, please call your primary care clinic.  If you do not have a primary care provider, please call [phone] and they will assist you.        Care EveryWhere ID     This is your Care EveryWhere ID. This could be used by other organizations to access your [place] medical records  [id number]        Your Vitals Were     Pulse Temperature Pulse Oximetry BMI (Body Mass Index)          101 98.1  F (36.7  C) (Oral) 98% 25.81 kg/m2         Blood Pressure from Last 3 Encounters:   11/06/17 136/68   05/30/17 134/76   03/20/17 116/78    Weight from Last 3 Encounters:   11/06/17 72 kg (158 lb 11.2 oz)   05/30/17 75.3 kg (165 lb 14.4 oz)   03/20/17 75.9 kg (167 lb 4.8 oz)              We Performed the Following     FLU VAC, SPLIT VIRUS IM > 3 YO (QUADRIVALENT) [94306]     Vaccine Administration, Initial [43179]          Today's Medication Changes          These changes are accurate as of: 11/6/17 12:04 PM.  If you have any questions, ask your nurse or doctor.               Start taking these medicines.        Dose/Directions    clotrimazole-betamethasone cream   Commonly known as:  LOTRISONE   Used for:  Yeast infection of the skin   Started by:  Eli Saul MD        Apply topically 2 times daily   Quantity:  30 g   Refills:  1            Where to get your medicines      These medications were sent to [facility]/pharmacy #[id number] - [place], [place] - [address], [address], [facility] [address]     Phone:  [phone]     clotrimazole-betamethasone cream                Primary Care Provider Office Phone # Fax #    Dolores Baldwin [phone] [address] [facility]  [address]        Equal Access to Services     [facility]: Hadii mike henao yue Fink, waaxda luqadaha, qaybta kaalmada ankit, waqar alba. So [facility] [phone].    ATENCIÓN: Si habla español, tiene a crenshaw disposición servicios gratuitos de asistencia lingüística. Llame al [phone].    We comply with applicable federal civil rights laws and [place] laws. We do not discriminate on the basis of race, color, national origin, age, disability, sex, sexual orientation, or gender identity.            Thank you!     Thank you for choosing [facility]  for your care. Our goal is always to provide you with excellent care. Hearing back from our patients is one way we can continue to improve our services. Please take a few minutes to complete the written survey that you may receive in the mail after your visit with us. Thank you!             Your Updated Medication List - Protect others around you: Learn how to safely use, store and throw away your medicines at www.disposemymeds.org.          This list is accurate as of: 11/6/17 12:04 PM.  Always use your most recent med list.                   Brand Name Dispense Instructions for use Diagnosis    amLODIPine 10 MG tablet    NORVASC    45 tablet    TAKE ONE-HALF (1/2) TABLET DAILY    Essential hypertension with goal blood pressure less than 140/90       atorvastatin 10 MG tablet    LIPITOR    90 tablet    TAKE 1 TABLET AT BEDTIME    Hyperlipidemia LDL goal <100       blood glucose monitoring meter device kit     1 kit    Use to test blood sugars 2 times daily or as directed.    DM type 2, goal HbA1c < 7% (H), Hyperlipidemia LDL goal <100       blood glucose monitoring test strip    ACCU-CHEK SMARTVIEW    2 Box    Use to test blood sugar 2 times daily or as directed.    DM type 2, goal HbA1c < 7% (H), Hyperlipidemia LDL goal <100       clotrimazole-betamethasone cream    LOTRISONE    30 g    Apply topically 2 times daily     Yeast infection of the skin       lisinopril 2.5 MG tablet    PRINIVIL/Zestril    30 tablet    Take 1 tablet (2.5 mg) by mouth daily    Type 2 diabetes mellitus without complication, without long-term current use of insulin (H)       Magnesium-Potassium 250-100 MG Tabs       Migraine headache       metFORMIN 500 MG tablet    GLUCOPHAGE    180 tablet    Take 1 tablet (500 mg) by mouth 2 times daily (with meals) Needs to be seen for any further refill.    Type 2 diabetes mellitus without complication, without long-term current use of insulin (H)       Multi-vitamin Tabs tablet      Take 1 tablet by mouth daily        order for DME     1 each    Equipment being ordered: Blood pressure monitor. Check blood pressure every morning. Goal blood pressure: systolic less than 125; diastolic blood pressure less than 75.    HTN (hypertension)       polyethylene glycol powder    MIRALAX/GLYCOLAX    527 g    MIX 17 GRAM (1 CAPFUL) IN 4 TO 8 OUNCES OF LIQUID AND DRINK BY MOUTH DAILY    Constipation, unspecified constipation type       triamterene-hydrochlorothiazide 37.5-25 MG per capsule    DYAZIDE    90 capsule    TAKE 1 CAPSULE DAILY    Essential hypertension with goal blood pressure less than 140/90

## 2017-11-06 NOTE — PROGRESS NOTES
SUBJECTIVE:   Fred Núñez is a 42 year old male who presents to clinic today for the following health issues:    Rash  Onset: 10/26/17    Description:   Location: axillary area  Character: raised  Itching (Pruritis): YES    Progression of Symptoms:  worsening    Accompanying Signs & Symptoms:  Fever: YES 10/22/17  Body aches or joint pain: YES 10/22/17  Sore throat symptoms: no   Recent cold symptoms: YES    History:   Previous similar rash: no - had heat rash before     Precipitating factors:   Exposure to similar rash: no   New exposures:  soaps    Recent travel: no   Therapies Tried and outcome: cortisone 10 started earlier, lotrimin AF antifungal yesterday. Both medications subside itching temporarily       Patient developed red, itchy rash underneath right arm two weeks ago. He describes his symptoms of being constantly itchy. Denies burning, tingling and pain. He had a cold a couple of days before the rash appeared. Mentioned that he had heat rash in the past and was on a trip with his male friends where he used someone else's bar soap. Started applying hydrocortisone which temporarily alleviated itching, but then symptoms came back. He started using lotrimin last night. Patient was on antibiotic course at the end of August. No other new exposures  No rash for wife and child at home.     Problem list and histories reviewed & adjusted, as indicated.  Additional history: as documented    BP Readings from Last 3 Encounters:   11/06/17 136/68   05/30/17 134/76   03/20/17 116/78    Wt Readings from Last 3 Encounters:   11/06/17 72 kg (158 lb 11.2 oz)   05/30/17 75.3 kg (165 lb 14.4 oz)   03/20/17 75.9 kg (167 lb 4.8 oz)                      Reviewed and updated as needed this visit by clinical staffTobacco  Allergies  Meds  Med Hx  Surg Hx  Fam Hx  Soc Hx      Reviewed and updated as needed this visit by Provider  Tobacco  Allergies  Meds  Med Hx  Surg Hx  Fam Hx  Soc Hx         ROS:  Constitutional, HEENT, cardiovascular, pulmonary, GI, , musculoskeletal, neuro, skin, endocrine and psych systems are negative, except as otherwise noted.    This document serves as a record of the services and decisions personally performed and made by Eli Saul MD. It was created on her behalf by Marcy Anna, a trained medical scribe. The creation of this document is based on the provider's statements to the medical scribe.  Marcy Anna 11:49 AM November 6, 2017    OBJECTIVE:   /68 (BP Location: Right arm, Patient Position: Chair, Cuff Size: Adult Regular)  Pulse 101  Temp 98.1  F (36.7  C) (Oral)  Wt 72 kg (158 lb 11.2 oz)  SpO2 98%  BMI 25.81 kg/m2  Body mass index is 25.81 kg/(m^2).     GENERAL: healthy, alert and no distress  RESP: lungs clear to auscultation - no rales, rhonchi or wheezes  CV: regular rate and rhythm, normal S1 S2, no S3 or S4, no murmur, click or rub, no peripheral edema and peripheral pulses strong  SKIN: erythematous area covering axillary area with satellite lesion noted.  No secondary infection.  , otherwise no suspicious lesions or rashes    ASSESSMENT/PLAN:   1. Yeast infection of the skin  Itchy rash on right side for 2 weeks. Starting 14 day course of lotrisone cream. Follow up if worsening or not improving.   - clotrimazole-betamethasone (LOTRISONE) cream; Apply topically 2 times daily  Dispense: 30 g; Refill: 1    2. Need for prophylactic vaccination and inoculation against influenza  - FLU VAC, SPLIT VIRUS IM > 3 YO (QUADRIVALENT) [44148]  - Vaccine Administration, Initial [32420]    Patient Instructions   Try Using spray-on Deoderant instead of roll-on Deoderant    Wear loose clothing for exposure to air    Apply Lotrisone cream to the affected area twice daily for 14 days     Flu shot today    Labs next week     The information in this document, created by the medical scribe for me, accurately reflects the services I personally performed  and the decisions made by me. I have reviewed and approved this document for accuracy prior to leaving the patient care area.  November 6, 2017 12:29 PM    Eli Saul MD  [facility]    Injectable Influenza Immunization Documentation  1.  Is the person to be vaccinated sick today?   No  2. Does the person to be vaccinated have an allergy to a component   of the vaccine?   No  Egg Allergy Algorithm Link  3. Has the person to be vaccinated ever had a serious reaction   to influenza vaccine in the past?   No  4. Has the person to be vaccinated ever had Guillain-Barré syndrome?   No  Form completed by Cathy Duffy CMA

## 2017-11-06 NOTE — NURSING NOTE
"Chief Complaint   Patient presents with     Derm Problem     irritation/rash left axillary       Initial /68 (BP Location: Right arm, Patient Position: Chair, Cuff Size: Adult Regular)  Pulse 101  Temp 98.1  F (36.7  C) (Oral)  Wt 72 kg (158 lb 11.2 oz)  SpO2 98%  BMI 25.81 kg/m2 Estimated body mass index is 25.81 kg/(m^2) as calculated from the following:    Height as of 5/30/17: 1.67 m (5' 5.75\").    Weight as of this encounter: 72 kg (158 lb 11.2 oz).  Medication Reconciliation: complete   Cathy Duffy CMA      "

## 2017-11-06 NOTE — PATIENT INSTRUCTIONS
Try Using spray-on Deoderant instead of roll-on Deoderant    Wear loose clothing for exposure to air    Apply Lotrisone cream to the affected area twice daily for 14 days     Flu shot today    Labs next week

## 2017-11-13 ENCOUNTER — DOCUMENTATION ONLY (OUTPATIENT)
Dept: LAB | Facility: CLINIC | Age: 42
End: 2017-11-13

## 2017-11-13 DIAGNOSIS — E11.9 TYPE 2 DIABETES MELLITUS WITHOUT COMPLICATION, WITHOUT LONG-TERM CURRENT USE OF INSULIN (H): Primary | ICD-10-CM

## 2017-11-13 NOTE — PROGRESS NOTES
Please place or confirm lab orders for upcoming lab appointment on 11/16/2017. TAMARA Whalen CMA.

## 2017-11-16 DIAGNOSIS — E11.9 TYPE 2 DIABETES MELLITUS WITHOUT COMPLICATION, WITHOUT LONG-TERM CURRENT USE OF INSULIN (H): ICD-10-CM

## 2017-11-16 LAB
ANION GAP SERPL CALCULATED.3IONS-SCNC: 11 MMOL/L (ref 3–14)
BUN SERPL-MCNC: 15 MG/DL (ref 7–30)
CALCIUM SERPL-MCNC: 9.1 MG/DL (ref 8.5–10.1)
CHLORIDE SERPL-SCNC: 102 MMOL/L (ref 94–109)
CHOLEST SERPL-MCNC: 154 MG/DL
CO2 SERPL-SCNC: 25 MMOL/L (ref 20–32)
CREAT SERPL-MCNC: 0.85 MG/DL (ref 0.66–1.25)
CREAT UR-MCNC: 134 MG/DL
GFR SERPL CREATININE-BSD FRML MDRD: >90 ML/MIN/1.7M2
GLUCOSE SERPL-MCNC: 78 MG/DL (ref 70–99)
HBA1C MFR BLD: 5.4 % (ref 4.3–6)
HDLC SERPL-MCNC: 46 MG/DL
LDLC SERPL CALC-MCNC: 65 MG/DL
MICROALBUMIN UR-MCNC: 5 MG/L
MICROALBUMIN/CREAT UR: 3.84 MG/G CR (ref 0–17)
NONHDLC SERPL-MCNC: 108 MG/DL
POTASSIUM SERPL-SCNC: 4.1 MMOL/L (ref 3.4–5.3)
SODIUM SERPL-SCNC: 138 MMOL/L (ref 133–144)
TRIGL SERPL-MCNC: 215 MG/DL

## 2017-11-16 PROCEDURE — 83036 HEMOGLOBIN GLYCOSYLATED A1C: CPT | Performed by: FAMILY MEDICINE

## 2017-11-16 PROCEDURE — 36415 COLL VENOUS BLD VENIPUNCTURE: CPT | Performed by: FAMILY MEDICINE

## 2017-11-16 PROCEDURE — 80061 LIPID PANEL: CPT | Performed by: FAMILY MEDICINE

## 2017-11-16 PROCEDURE — 80048 BASIC METABOLIC PNL TOTAL CA: CPT | Performed by: FAMILY MEDICINE

## 2017-11-16 PROCEDURE — 82043 UR ALBUMIN QUANTITATIVE: CPT | Performed by: FAMILY MEDICINE

## 2017-11-22 ENCOUNTER — OFFICE VISIT (OUTPATIENT)
Dept: FAMILY MEDICINE | Facility: CLINIC | Age: 42
End: 2017-11-22
Payer: COMMERCIAL

## 2017-11-22 VITALS
RESPIRATION RATE: 12 BRPM | DIASTOLIC BLOOD PRESSURE: 68 MMHG | BODY MASS INDEX: 25.71 KG/M2 | TEMPERATURE: 98.1 F | OXYGEN SATURATION: 98 % | HEIGHT: 66 IN | SYSTOLIC BLOOD PRESSURE: 120 MMHG | HEART RATE: 98 BPM | WEIGHT: 160 LBS

## 2017-11-22 DIAGNOSIS — E78.5 HYPERLIPIDEMIA LDL GOAL <100: ICD-10-CM

## 2017-11-22 DIAGNOSIS — I10 ESSENTIAL HYPERTENSION WITH GOAL BLOOD PRESSURE LESS THAN 140/90: ICD-10-CM

## 2017-11-22 DIAGNOSIS — E11.9 TYPE 2 DIABETES MELLITUS WITHOUT COMPLICATION, WITHOUT LONG-TERM CURRENT USE OF INSULIN (H): ICD-10-CM

## 2017-11-22 PROCEDURE — 99214 OFFICE O/P EST MOD 30 MIN: CPT | Performed by: FAMILY MEDICINE

## 2017-11-22 RX ORDER — AMLODIPINE BESYLATE 10 MG/1
5 TABLET ORAL DAILY
Qty: 45 TABLET | Refills: 1 | Status: SHIPPED | OUTPATIENT
Start: 2017-11-22 | End: 2018-01-29

## 2017-11-22 RX ORDER — ATORVASTATIN CALCIUM 10 MG/1
10 TABLET, FILM COATED ORAL AT BEDTIME
Qty: 90 TABLET | Refills: 1 | Status: SHIPPED | OUTPATIENT
Start: 2017-11-22 | End: 2018-07-19

## 2017-11-22 RX ORDER — TRIAMTERENE AND HYDROCHLOROTHIAZIDE 37.5; 25 MG/1; MG/1
1 CAPSULE ORAL DAILY
Qty: 90 CAPSULE | Refills: 1 | Status: SHIPPED | OUTPATIENT
Start: 2017-11-22 | End: 2018-01-29

## 2017-11-22 RX ORDER — LISINOPRIL 2.5 MG/1
2.5 TABLET ORAL DAILY
Qty: 90 TABLET | Refills: 1 | Status: SHIPPED | OUTPATIENT
Start: 2017-11-22 | End: 2018-06-09

## 2017-11-22 NOTE — NURSING NOTE
"Chief Complaint   Patient presents with     Results       Initial /68 (BP Location: Right arm, Patient Position: Sitting, Cuff Size: Adult Regular)  Pulse 98  Temp 98.1  F (36.7  C) (Oral)  Resp 12  Ht 1.67 m (5' 5.75\")  Wt 72.6 kg (160 lb)  SpO2 98%  BMI 26.02 kg/m2 Estimated body mass index is 26.02 kg/(m^2) as calculated from the following:    Height as of this encounter: 1.67 m (5' 5.75\").    Weight as of this encounter: 72.6 kg (160 lb).  Medication Reconciliation: greg Brady        "

## 2017-11-22 NOTE — MR AVS SNAPSHOT
After Visit Summary   11/22/2017    Fred Núñez    MRN: [id number]           Patient Information     Date Of Birth          1975        Visit Information        Provider Department      11/22/2017 4:40 PM Dolores Baldwin MD [facility]        Today's Diagnoses     Type 2 diabetes mellitus without complication, without long-term current use of insulin (H)        Essential hypertension with goal blood pressure less than 140/90        Hyperlipidemia LDL goal <100           Follow-ups after your visit        Follow-up notes from your care team     Return in about 3 months (around 2/22/2018).      Future tests that were ordered for you today     Open Future Orders        Priority Expected Expires Ordered    **A1C FUTURE anytime Routine 11/22/2017 11/22/2018 11/22/2017            Who to contact     If you have questions or need follow up information about today's clinic visit or your schedule please contact [facility] directly at [phone].  Normal or non-critical lab and imaging results will be communicated to you by MyChart, letter or phone within 4 business days after the clinic has received the results. If you do not hear from us within 7 days, please contact the clinic through SocialEarsh[employer] or phone. If you have a critical or abnormal lab result, we will notify you by phone as soon as possible.  Submit refill requests through Vent[employer] or call your pharmacy and they will forward the refill request to us. Please allow 3 business days for your refill to be completed.          Additional Information About Your Visit        SocialEarsh[employer] Information     [employer] gives you secure access to your electronic health record. If you see a primary care provider, you can also send messages to your care team and make appointments. If you have questions, please call your primary care clinic.  If you do not have a primary care provider, please call [phone] and they will assist  "you.        Care EveryWhere ID     This is your Care EveryWhere ID. This could be used by other organizations to access your [place] medical records  [id number]        Your Vitals Were     Pulse Temperature Respirations Height Pulse Oximetry BMI (Body Mass Index)    98 98.1  F (36.7  C) (Oral) 12 1.67 m (5' 5.75\") 98% 26.02 kg/m2       Blood Pressure from Last 3 Encounters:   11/22/17 120/68   11/06/17 136/68   05/30/17 134/76    Weight from Last 3 Encounters:   11/22/17 72.6 kg (160 lb)   11/06/17 72 kg (158 lb 11.2 oz)   05/30/17 75.3 kg (165 lb 14.4 oz)                 Today's Medication Changes          These changes are accurate as of: 11/22/17  5:09 PM.  If you have any questions, ask your nurse or doctor.               These medicines have changed or have updated prescriptions.        Dose/Directions    amLODIPine 10 MG tablet   Commonly known as:  NORVASC   This may have changed:  See the new instructions.   Used for:  Essential hypertension with goal blood pressure less than 140/90   Changed by:  Dolores Baldwin MD        Dose:  5 mg   Take 0.5 tablets (5 mg) by mouth daily   Quantity:  45 tablet   Refills:  1       atorvastatin 10 MG tablet   Commonly known as:  LIPITOR   This may have changed:  See the new instructions.   Used for:  Hyperlipidemia LDL goal <100   Changed by:  Dolores Baldwin MD        Dose:  10 mg   Take 1 tablet (10 mg) by mouth At Bedtime   Quantity:  90 tablet   Refills:  1       metFORMIN 500 MG tablet   Commonly known as:  GLUCOPHAGE   This may have changed:    - when to take this  - additional instructions   Used for:  Type 2 diabetes mellitus without complication, without long-term current use of insulin (H)   Changed by:  Dolores Baldwin MD        Dose:  500 mg   Take 1 tablet (500 mg) by mouth daily (with breakfast)   Quantity:  90 tablet   Refills:  1       triamterene-hydrochlorothiazide 37.5-25 MG per capsule   Commonly known as:  DYAZIDE   This may " have changed:  See the new instructions.   Used for:  Essential hypertension with goal blood pressure less than 140/90   Changed by:  Dolores Baldwin MD        Dose:  1 capsule   Take 1 capsule by mouth daily   Quantity:  90 capsule   Refills:  1            Where to get your medicines      These medications were sent to [facility]/pharmacy #[id number] - [place], [place] - [address] [place] [address] [facility]  [address] [facility] [address]     Phone:  [phone]     amLODIPine 10 MG tablet    atorvastatin 10 MG tablet    lisinopril 2.5 MG tablet    metFORMIN 500 MG tablet    triamterene-hydrochlorothiazide 37.5-25 MG per capsule                Primary Care Provider Office Phone # Fax #    Dolores Baldwin [phone] [address] [address]        Equal Access to Services     [facility]: Hadii aad ku hadasho Soomaali, waaxda luqadaha, qaybta kaalmada adeegyada, waxay idiin hayaan thuy kharapetar de la torre . So [facility] [phone].    ATENCIÓN: Si habla español, tiene a crenshaw disposición servicios gratuitos de asistencia lingüística. Llame al [phone].    We comply with applicable federal civil rights laws and [place] laws. We do not discriminate on the basis of race, color, national origin, age, disability, sex, sexual orientation, or gender identity.            Thank you!     Thank you for choosing [facility]  for your care. Our goal is always to provide you with excellent care. Hearing back from our patients is one way we can continue to improve our services. Please take a few minutes to complete the written survey that you may receive in the mail after your visit with us. Thank you!             Your Updated Medication List - Protect others around you: Learn how to safely use, store and throw away your medicines at www.disposemymeds.org.          This list is accurate as of: 11/22/17  5:09 PM.  Always use your most  recent med list.                   Brand Name Dispense Instructions for use Diagnosis    amLODIPine 10 MG tablet    NORVASC    45 tablet    Take 0.5 tablets (5 mg) by mouth daily    Essential hypertension with goal blood pressure less than 140/90       atorvastatin 10 MG tablet    LIPITOR    90 tablet    Take 1 tablet (10 mg) by mouth At Bedtime    Hyperlipidemia LDL goal <100       blood glucose monitoring meter device kit     1 kit    Use to test blood sugars 2 times daily or as directed.    DM type 2, goal HbA1c < 7% (H), Hyperlipidemia LDL goal <100       blood glucose monitoring test strip    ACCU-CHEK SMARTVIEW    2 Box    Use to test blood sugar 2 times daily or as directed.    DM type 2, goal HbA1c < 7% (H), Hyperlipidemia LDL goal <100       clotrimazole-betamethasone cream    LOTRISONE    30 g    Apply topically 2 times daily    Yeast infection of the skin       lisinopril 2.5 MG tablet    PRINIVIL/Zestril    90 tablet    Take 1 tablet (2.5 mg) by mouth daily    Type 2 diabetes mellitus without complication, without long-term current use of insulin (H), Essential hypertension with goal blood pressure less than 140/90       Magnesium-Potassium 250-100 MG Tabs       Migraine headache       metFORMIN 500 MG tablet    GLUCOPHAGE    90 tablet    Take 1 tablet (500 mg) by mouth daily (with breakfast)    Type 2 diabetes mellitus without complication, without long-term current use of insulin (H)       Multi-vitamin Tabs tablet      Take 1 tablet by mouth daily        order for DME     1 each    Equipment being ordered: Blood pressure monitor. Check blood pressure every morning. Goal blood pressure: systolic less than 125; diastolic blood pressure less than 75.    HTN (hypertension)       polyethylene glycol powder    MIRALAX/GLYCOLAX    527 g    MIX 17 GRAM (1 CAPFUL) IN 4 TO 8 OUNCES OF LIQUID AND DRINK BY MOUTH DAILY    Constipation, unspecified constipation type       triamterene-hydrochlorothiazide 37.5-25 MG  per capsule    DYAZIDE    90 capsule    Take 1 capsule by mouth daily    Essential hypertension with goal blood pressure less than 140/90

## 2017-11-22 NOTE — PROGRESS NOTES
"  SUBJECTIVE:   Fred Núñez is a 42 year old male who presents to clinic today for the following health issues:    Follow up Lab results    SUBJECTIVE:  Here today in follow-up of diabetes, hypertension, lipids. Review recent lab work. A1c is fantastic at 5.4. Patient has lost 35 pounds since last year. Very little sugars. Exercising. Feels great.  Doing well.  Reports no interval health concerns.  Patient reports no side effects from medications, and desires no change in therapy.     Review of systems otherwise negative.  Past medical, family, and social history reviewed and updated in chart.    OBJECTIVE:  /68 (BP Location: Right arm, Patient Position: Sitting, Cuff Size: Adult Regular)  Pulse 98  Temp 98.1  F (36.7  C) (Oral)  Resp 12  Ht 1.67 m (5' 5.75\")  Wt 72.6 kg (160 lb)  SpO2 98%  BMI 26.02 kg/m2  Alert, pleasant, upbeat, and in no apparent discomfort.  S1 and S2 normal, no murmurs, clicks, gallops or rubs. Regular rate and rhythm. Chest is clear; no wheezes or rales. No edema or JVD.   FEET: both sides normal; no swelling, tenderness or skin or vascular lesions.  Sensation is intact. Peripheral pulses are palpable. Toenails are normal.   Past labs reviewed with the patient.     ASSESSMENT / PLAN:  (E11.9) Type 2 diabetes mellitus without complication, without long-term current use of insulin (H)  Comment:  I'm wondering if he hasn't actually reverse the process may not be diabetic at all any longer. We will decrease down to once daily metformin and plan to recheck in 3 months   Plan: metFORMIN (GLUCOPHAGE) 500 MG tablet,         lisinopril (PRINIVIL/ZESTRIL) 2.5 MG tablet            (I10) Essential hypertension with goal blood pressure less than 140/90  Comment: well controlled on current regimen. Continue  Plan: triamterene-hydrochlorothiazide (DYAZIDE)         37.5-25 MG per capsule, amLODIPine (NORVASC) 10        MG tablet            (E78.5) Hyperlipidemia LDL goal <100  Comment: " doing well - continue same dosage   Plan: atorvastatin (LIPITOR) 10 MG tablet            Follow up 3 months - recheck A1c  S. Andrez Baldwin MD    (Chart documentation completed in part with Dragon voice-recognition software.  Even though reviewed some grammatical, spelling, and word errors may remain.)

## 2017-12-10 DIAGNOSIS — K59.00 CONSTIPATION, UNSPECIFIED CONSTIPATION TYPE: ICD-10-CM

## 2017-12-11 NOTE — TELEPHONE ENCOUNTER
polyethylene glycol (MIRALAX/GLYCOLAX) powder      Last Written Prescription Date: 9/7/17  Last Fill Quantity: 527g,  # refills: 2   Last Office Visit with [facility], [facility] or [facility] prescribing provider: 11/22/17

## 2017-12-13 RX ORDER — POLYETHYLENE GLYCOL 3350 17 G/17G
POWDER, FOR SOLUTION ORAL
Qty: 527 G | Refills: 2 | Status: SHIPPED | OUTPATIENT
Start: 2017-12-13 | End: 2018-03-09

## 2017-12-14 DIAGNOSIS — I10 ESSENTIAL HYPERTENSION WITH GOAL BLOOD PRESSURE LESS THAN 140/90: ICD-10-CM

## 2017-12-14 DIAGNOSIS — E78.5 HYPERLIPIDEMIA LDL GOAL <100: ICD-10-CM

## 2017-12-14 NOTE — TELEPHONE ENCOUNTER
atorvastatin (LIPITOR) 10 MG tablet     Last Written Prescription Date: 11/22/17  Last Fill Quantity: 90, # refills: 1  [place] [facility] [facility] or [facility] prescribing provider: 11/22/17       Lab Results   Component Value Date    CHOL 154 11/16/2017     Lab Results   Component Value Date    HDL 46 11/16/2017     Lab Results   Component Value Date    LDL 65 11/16/2017     Lab Results   Component Value Date    TRIG 215 11/16/2017     No results found for: CHOLVONNIEATIO      amLODIPine (NORVASC) 10 MG tablet         Last Written Prescription Date: 11/22/17  Last Fill Quantity: 45, # refills: 1    Last Office Visit with [facility], [facility] or [facility] prescribing provider:  11/22/17   Future Office Visit:      BP Readings from Last 3 Encounters:   11/22/17 120/68   11/06/17 136/68   05/30/17 134/76     Lab Results   Component Value Date    ALT 75 12/08/2016     Lab Results   Component Value Date    CHOL 154 11/16/2017     Lab Results   Component Value Date    HDL 46 11/16/2017     Lab Results   Component Value Date    LDL 65 11/16/2017     Lab Results   Component Value Date    TRIG 215 11/16/2017     No results found for: CHOLVONNIEATIO      triamterene-hydrochlorothiazide (DYAZIDE) 37.5-25 MG per capsule      Last Written Prescription Date: 11/22/17  Last Fill Quantity: 90, # refills: 1  Last Office Visit with [facility], [facility] or [facility] prescribing provider: 11/22/17       Potassium   Date Value Ref Range Status   11/16/2017 4.1 3.4 - 5.3 mmol/L Final     Creatinine   Date Value Ref Range Status   11/16/2017 0.85 0.66 - 1.25 mg/dL Final     BP Readings from Last 3 Encounters:   11/22/17 120/68   11/06/17 136/68   05/30/17 134/76

## 2017-12-19 RX ORDER — AMLODIPINE BESYLATE 10 MG/1
TABLET ORAL
Qty: 45 TABLET | Refills: 0 | OUTPATIENT
Start: 2017-12-19

## 2017-12-19 RX ORDER — TRIAMTERENE AND HYDROCHLOROTHIAZIDE 37.5; 25 MG/1; MG/1
CAPSULE ORAL
Qty: 90 CAPSULE | Refills: 0 | OUTPATIENT
Start: 2017-12-19

## 2017-12-19 RX ORDER — ATORVASTATIN CALCIUM 10 MG/1
TABLET, FILM COATED ORAL
Qty: 90 TABLET | Refills: 0 | OUTPATIENT
Start: 2017-12-19

## 2018-01-29 DIAGNOSIS — I10 ESSENTIAL HYPERTENSION WITH GOAL BLOOD PRESSURE LESS THAN 140/90: ICD-10-CM

## 2018-01-29 NOTE — TELEPHONE ENCOUNTER
Requested Prescriptions   Pending Prescriptions Disp Refills     triamterene-hydrochlorothiazide (DYAZIDE) 37.5-25 MG per capsule 90 capsule 1     Sig: Take 1 capsule by mouth daily    There is no refill protocol information for this order        triamterene-hydrochlorothiazide (DYAZIDE) 37.5-25 MG per capsule  Last Written Prescription Date:  11/22/17  Last Fill Quantity: 90,  # refills: 1   Last Office Visit with [facility] provider:  11/22/17   Future Office Visit:

## 2018-01-29 NOTE — TELEPHONE ENCOUNTER
Requested Prescriptions   Pending Prescriptions Disp Refills     amLODIPine (NORVASC) 10 MG tablet 45 tablet 1     Sig: Take 0.5 tablets (5 mg) by mouth daily    There is no refill protocol information for this order        amLODIPine (NORVASC) 10 MG tablet  Last Written Prescription Date:  11/22/17  Last Fill Quantity: 45,  # refills: 1   Last Office Visit with [facility] provider:  11/22/17   Future Office Visit:

## 2018-02-02 RX ORDER — AMLODIPINE BESYLATE 10 MG/1
5 TABLET ORAL DAILY
Qty: 45 TABLET | Refills: 1 | Status: SHIPPED | OUTPATIENT
Start: 2018-02-02 | End: 2018-07-22 | Stop reason: DRUGHIGH

## 2018-02-02 RX ORDER — TRIAMTERENE AND HYDROCHLOROTHIAZIDE 37.5; 25 MG/1; MG/1
1 CAPSULE ORAL DAILY
Qty: 90 CAPSULE | Refills: 1 | Status: SHIPPED | OUTPATIENT
Start: 2018-02-02 | End: 2018-07-19

## 2018-02-02 NOTE — TELEPHONE ENCOUNTER
"Requested Prescriptions   Pending Prescriptions Disp Refills     amLODIPine (NORVASC) 10 MG tablet 45 tablet 1     Sig: Take 0.5 tablets (5 mg) by mouth daily    Calcium Channel Blockers Protocol  Passed    1/29/2018  8:59 AM       Passed - Blood pressure under 140/90    BP Readings from Last 3 Encounters:   11/22/17 120/68   11/06/17 136/68   05/30/17 134/76                Passed - Recent or future visit with authorizing provider    Patient had office visit in the last year or has a visit in the next 30 days with authorizing provider.  See \"Patient Info\" tab in inbasket, or \"Choose Columns\" in Meds & Orders section of the refill encounter.            Passed - Patient is age 18 or older       Passed - Normal serum creatinine on file in past 12 months    Recent Labs   Lab Test  11/16/17   0658   CR  0.85               "

## 2018-02-02 NOTE — TELEPHONE ENCOUNTER
"Requested Prescriptions   Pending Prescriptions Disp Refills     triamterene-hydrochlorothiazide (DYAZIDE) 37.5-25 MG per capsule 90 capsule 0     Sig: Take 1 capsule by mouth daily    Diuretics (Including Combos) Protocol Passed    1/29/2018  9:00 AM       Passed - Blood pressure under 140/90    BP Readings from Last 3 Encounters:   11/22/17 120/68   11/06/17 136/68   05/30/17 134/76                Passed - Recent or future visit with authorizing provider's specialty    Patient had office visit in the last year or has a visit in the next 30 days with authorizing provider.  See \"Patient Info\" tab in inbasket, or \"Choose Columns\" in Meds & Orders section of the refill encounter.            Passed - Patient is age 18 or older       Passed - Normal serum creatinine on file in past 12 months    Recent Labs   Lab Test  11/16/17   0658   CR  0.85             Passed - Normal serum potassium on file in past 12 months    Recent Labs   Lab Test  11/16/17   0658   POTASSIUM  4.1                   Passed - Normal serum sodium on file in past 12 months    Recent Labs   Lab Test  11/16/17   0658   NA  138                "

## 2018-03-09 DIAGNOSIS — K59.00 CONSTIPATION, UNSPECIFIED CONSTIPATION TYPE: ICD-10-CM

## 2018-03-09 NOTE — TELEPHONE ENCOUNTER
"Requested Prescriptions   Pending Prescriptions Disp Refills     polyethylene glycol (MIRALAX/GLYCOLAX) powder [Pharmacy Med Name: POLYETHYLENE GLYCOL 3350 POWD]  Last Written Prescription Date:  12/13/17  Last Fill Quantity: 527g,  # refills: 2   Last Office Visit with [facility] or [facility] prescribing provider:  11/22/17   Future Office Visit:    527 g 2     Sig: MIX 17 GRAM (1 CAPFUL) IN 4 TO 8 OUNCES OF LIQUID AND DRINK BY MOUTH DAILY    Laxatives Protocol Passed    3/9/2018  1:45 AM       Passed - Patient is age 6 or older       Passed - Recent (12 mo) or future (30 days) visit within the authorizing provider's specialty    Patient had office visit in the last year or has a visit in the next 30 days with authorizing provider.  See \"Patient Info\" tab in inbasket, or \"Choose Columns\" in Meds & Orders section of the refill encounter.               "

## 2018-03-12 RX ORDER — POLYETHYLENE GLYCOL 3350 17 G/17G
POWDER, FOR SOLUTION ORAL
Qty: 527 G | Refills: 1 | Status: SHIPPED | OUTPATIENT
Start: 2018-03-12 | End: 2018-05-06

## 2018-04-14 DIAGNOSIS — E11.9 TYPE 2 DIABETES MELLITUS WITHOUT COMPLICATION, WITHOUT LONG-TERM CURRENT USE OF INSULIN (H): ICD-10-CM

## 2018-04-16 NOTE — TELEPHONE ENCOUNTER
"Requested Prescriptions   Pending Prescriptions Disp Refills     metFORMIN (GLUCOPHAGE) 500 MG tablet [Pharmacy Med Name: METFORMIN HCL TABS 500MG] 180 tablet 0     Sig: TAKE 1 TABLET TWICE A DAY WITH MEALS (NEED TO BE SEEN FOR ANY FURTHER REFILL)    Biguanide Agents Passed    4/14/2018  7:53 PM       Passed - Blood pressure less than 140/90 in past 6 months    BP Readings from Last 3 Encounters:   11/22/17 120/68   11/06/17 136/68   05/30/17 134/76                Passed - Patient has documented LDL within the past 12 mos.    Recent Labs   Lab Test  11/16/17   0658   LDL  65            Passed - Patient has had a Microalbumin in the past 12 mos.    Recent Labs   Lab Test  11/16/17   0703   MICROL  5   UMALCR  3.84            Passed - Patient is age 10 or older       Passed - Patient has documented A1c within the specified period of time.    Recent Labs   Lab Test  11/16/17   0658   A1C  5.4            Passed - Patient's CR is NOT>1.4 OR Patient's EGFR is NOT<45 within past 12 mos.    Recent Labs   Lab Test  11/16/17   0658   GFRESTIMATED  >90   GFRESTBLACK  >90       Recent Labs   Lab Test  11/16/17   0658   CR  0.85            Passed - Patient does NOT have a diagnosis of CHF.       Passed - Recent (6 mo) or future (30 days) visit within the authorizing provider's specialty    Patient had office visit in the last 6 months or has a visit in the next 30 days with authorizing provider or within the authorizing provider's specialty.  See \"Patient Info\" tab in inbasket, or \"Choose Columns\" in Meds & Orders section of the refill encounter.            metFORMIN (GLUCOPHAGE) 500 MG tablet  Last Written Prescription Date:  11/22/17  Last Fill Quantity: 90,  # refills: 1   Last office visit: 11/22/2017 with prescribing provider:  Dr. Baldwin   Future Office Visit:      "

## 2018-04-17 DIAGNOSIS — E11.9 TYPE 2 DIABETES MELLITUS WITHOUT COMPLICATION, WITHOUT LONG-TERM CURRENT USE OF INSULIN (H): ICD-10-CM

## 2018-04-17 LAB — HBA1C MFR BLD: 5.6 % (ref 0–5.6)

## 2018-04-17 PROCEDURE — 83036 HEMOGLOBIN GLYCOSYLATED A1C: CPT | Performed by: FAMILY MEDICINE

## 2018-04-17 PROCEDURE — 36415 COLL VENOUS BLD VENIPUNCTURE: CPT | Performed by: FAMILY MEDICINE

## 2018-04-17 NOTE — TELEPHONE ENCOUNTER
To Dr. Solorzano to advise on refill please Metformin.  Patient states is taking 500mg, one tab daily.  He scheduled his A1c labwork for tonight.   Uses mail order so requesting 90 day.   Per 11/22/17 OV note:  We will decrease down to once daily metformin and plan to recheck in 3 months

## 2018-04-18 NOTE — PROGRESS NOTES
Fred, that sugar levels still looks fantastic.  Looks like once a day is working just fine.  Keep up the good work and let us plan to see each other in a few months.  BURAK Baldwin M.D.

## 2018-05-06 DIAGNOSIS — K59.00 CONSTIPATION, UNSPECIFIED CONSTIPATION TYPE: ICD-10-CM

## 2018-05-06 NOTE — TELEPHONE ENCOUNTER
"Requested Prescriptions   Pending Prescriptions Disp Refills     polyethylene glycol (MIRALAX/GLYCOLAX) powder [Pharmacy Med Name: POLYETHYLENE GLYCOL 3350 POWD] 527 g 1    Last Written Prescription Date:  3/12/18  Last Fill Quantity: 527,  # refills: 0   Last Office Visit with [facility], [facility] or [facility] prescribing provider: 11/22/17     Future Office Visit:      Sig: MIX 17 GRAM (1 CAPFUL) IN 4 TO 8 OUNCES OF LIQUID AND DRINK BY MOUTH DAILY    Laxatives Protocol Passed    5/6/2018  8:26 AM       Passed - Patient is age 6 or older       Passed - Recent (12 mo) or future (30 days) visit within the authorizing provider's specialty    Patient had office visit in the last 12 months or has a visit in the next 30 days with authorizing provider or within the authorizing provider's specialty.  See \"Patient Info\" tab in inbasket, or \"Choose Columns\" in Meds & Orders section of the refill encounter.              "

## 2018-05-09 RX ORDER — POLYETHYLENE GLYCOL 3350 17 G/17G
POWDER, FOR SOLUTION ORAL
Qty: 527 G | Refills: 1 | Status: SHIPPED | OUTPATIENT
Start: 2018-05-09 | End: 2018-07-09

## 2018-05-09 NOTE — TELEPHONE ENCOUNTER
Prescription approved per [facility] Protocol.  Charley Meadows RN

## 2018-05-29 ENCOUNTER — OFFICE VISIT (OUTPATIENT)
Dept: FAMILY MEDICINE | Facility: CLINIC | Age: 43
End: 2018-05-29
Payer: COMMERCIAL

## 2018-05-29 VITALS
HEART RATE: 100 BPM | HEIGHT: 65 IN | SYSTOLIC BLOOD PRESSURE: 122 MMHG | DIASTOLIC BLOOD PRESSURE: 78 MMHG | OXYGEN SATURATION: 99 % | RESPIRATION RATE: 16 BRPM | TEMPERATURE: 98.1 F | WEIGHT: 166 LBS | BODY MASS INDEX: 27.66 KG/M2

## 2018-05-29 DIAGNOSIS — J02.9 VIRAL PHARYNGITIS: Primary | ICD-10-CM

## 2018-05-29 DIAGNOSIS — R07.0 THROAT PAIN: ICD-10-CM

## 2018-05-29 LAB
DEPRECATED S PYO AG THROAT QL EIA: NORMAL
SPECIMEN SOURCE: NORMAL

## 2018-05-29 PROCEDURE — 87081 CULTURE SCREEN ONLY: CPT | Performed by: PHYSICIAN ASSISTANT

## 2018-05-29 PROCEDURE — 87880 STREP A ASSAY W/OPTIC: CPT | Performed by: PHYSICIAN ASSISTANT

## 2018-05-29 PROCEDURE — 99213 OFFICE O/P EST LOW 20 MIN: CPT | Performed by: PHYSICIAN ASSISTANT

## 2018-05-29 ASSESSMENT — PAIN SCALES - GENERAL: PAINLEVEL: MODERATE PAIN (5)

## 2018-05-29 NOTE — PATIENT INSTRUCTIONS
At [facility], we strive to deliver an exceptional experience to you, every time we see you.  If you receive a survey in the mail, please send us back your thoughts. We really do value your feedback.      Suggested websites for health information:  Www.[place].org : Up to date and easily searchable information on multiple topics.  Www.medlineplus.gov : medication info, interactive tutorials, watch real surgeries online  Www.familydoctor.org : good info from the Academy of Family Physicians  Www.cdc.gov : public health info, travel advisories, epidemics (H1N1)  Www.aap.org : children's health info, normal development, vaccinations  Www.health.[facility].[place].us : [place] dept of health, public health issues in [place], N1N1    Your care team:     Family Medicine   LAVERNE Barnett MD Emily Bunt, YANELI OSMAN   S. MD Eli Sinclair MD Angela Wermerskirchen, MD         Clinic hours: Monday - Wednesday 7 am-7 pm   Thursdays and Fridays 7 am-5 pm.     [facility]: Monday - Friday 11 am-9 pm,   Saturday and Sunday 9 am-5 pm.    [facility]: Monday -Thursday 8 am-8 pm; Friday 8 am-6 pm; Saturday and Sunday 9 am-5 pm.     [facility]: Monday - Thursday 8 am - 7 pm; Friday 8 am - 6 pm    Clinic: [phone]   [facility]: [phone]   [facility]: [phone]

## 2018-05-29 NOTE — PROGRESS NOTES
SUBJECTIVE:   Fred Núñez is a 42 year old male who presents to clinic today for the following health issues:    Acute Illness   Acute illness concerns: sore thraot  Onset: Friday    Fever: no    Chills/Sweats: no    Headache (location?): YES    Sinus Pressure:no    Conjunctivitis:  no    Ear Pain: no    Rhinorrhea: no    Congestion: no    Sore Throat: YES     Cough: no    Wheeze: no    Decreased Appetite: YES    Nausea: no    Vomiting: no    Diarrhea:  no    Dysuria/Freq.: no    Fatigue/Achiness: YES    Sick/Strep Exposure: no     Therapies Tried and outcome: cough drops    Sore throat for 4 days. No cough. No known strep exposure but has 18 month old in .   Woke up from sleep approximately 5 AM this am.  Took advil yesterday which helped headache.  Has headache.  No cough.    Doesn't check blood sugars.  Last A1C  5.6 last month  Insurance only covers every other year for eye exam.        Problem list and histories reviewed & adjusted, as indicated.  Additional history: as documented    Patient Active Problem List   Diagnosis     CARDIOVASCULAR SCREENING; LDL GOAL LESS THAN 160     Migraine headache     HTN, goal below 140/90     Essential hypertension with goal blood pressure less than 140/90     Type 2 diabetes mellitus without complication (H)     DM type 2, goal HbA1c < 7% (H)     Claustrophobia     Hyperlipidemia LDL goal <100     Past Surgical History:   Procedure Laterality Date     NO HISTORY OF SURGERY         Social History   Substance Use Topics     Smoking status: Former Smoker     Types: Cigarettes     Smokeless tobacco: Never Used     Alcohol use Yes      Comment: 3 to 4 drinks - 4 to 7 days a week     Family History   Problem Relation Age of Onset     DIABETES Mother      HEART DISEASE Father      Musculoskeletal Disorder Father          Current Outpatient Prescriptions   Medication Sig Dispense Refill     amLODIPine (NORVASC) 10 MG tablet Take 0.5 tablets (5 mg) by mouth daily 45  "tablet 1     atorvastatin (LIPITOR) 10 MG tablet Take 1 tablet (10 mg) by mouth At Bedtime 90 tablet 1     blood glucose monitoring (ACCU-CHEK SMARTVIEW) test strip Use to test blood sugar 2 times daily or as directed. 2 Box 1     blood glucose monitoring (ONE TOUCH ULTRA 2) meter device kit Use to test blood sugars 2 times daily or as directed. 1 kit 0     lisinopril (PRINIVIL/ZESTRIL) 2.5 MG tablet Take 1 tablet (2.5 mg) by mouth daily 90 tablet 1     Magnesium-Potassium 250-100 MG TABS        metFORMIN (GLUCOPHAGE) 500 MG tablet Take 1 tablet (500 mg) by mouth daily (with dinner) 90 tablet 0     multivitamin, therapeutic with minerals (MULTI-VITAMIN) TABS Take 1 tablet by mouth daily       order for DME Equipment being ordered: Blood pressure monitor.  Check blood pressure every morning.  Goal blood pressure: systolic less than 125; diastolic blood pressure less than 75. 1 each 0     polyethylene glycol (MIRALAX/GLYCOLAX) powder MIX 17 GRAM (1 CAPFUL) IN 4 TO 8 OUNCES OF LIQUID AND DRINK BY MOUTH DAILY 527 g 1     triamterene-hydrochlorothiazide (DYAZIDE) 37.5-25 MG per capsule Take 1 capsule by mouth daily 90 capsule 1     [DISCONTINUED] metFORMIN (GLUCOPHAGE) 500 MG tablet Take 1 tablet (500 mg) by mouth daily (with breakfast) 90 tablet 1       Reviewed and updated as needed this visit by clinical staff  Tobacco  Allergies  Meds  Problems  Med Hx  Surg Hx  Fam Hx  Soc Hx        Reviewed and updated as needed this visit by Provider  Tobacco  Allergies  Meds  Problems  Med Hx  Surg Hx  Fam Hx  Soc Hx          ROS:  Constitutional, HEENT, cardiovascular, pulmonary, gi and gu systems are negative, except as otherwise noted.    OBJECTIVE:     /78 (BP Location: Right arm, Patient Position: Chair, Cuff Size: Adult Regular)  Pulse 100  Temp 98.1  F (36.7  C) (Oral)  Resp 16  Ht 1.645 m (5' 4.75\")  Wt 75.3 kg (166 lb)  SpO2 99%  BMI 27.84 kg/m2  Body mass index is 27.84 kg/(m^2).  GENERAL: " healthy, alert and no distress  EYES: Eyes grossly normal to inspection, PERRL and conjunctivae and sclerae normal  HENT: normal cephalic/atraumatic, ear canals and TM's normal, nose and mouth without ulcers or lesions, tonsillar erythema and no tonsillar edema or exudate   NECK: no adenopathy, no asymmetry, masses, or scars and thyroid normal to palpation  RESP: lungs clear to auscultation - no rales, rhonchi or wheezes  CV: regular rate and rhythm, normal S1 S2, no S3 or S4, no murmur, click or rub, no peripheral edema and peripheral pulses strong  MS: no gross musculoskeletal defects noted, no edema    Diagnostic Test Results:  Rapid strep is negative;  Culture pending.     ASSESSMENT/PLAN:             1. Viral pharyngitis  Negative strep test. Although tonsils erythema- no edema or exudate.   Reviewed symptomatic care.  Follow up with us by end of the week if symptoms not improving.  Return urgently if any change in symptoms like fever, cough, increasing pain or other change in symptoms.     2. Throat pain    - Strep, Rapid Screen    Patient Instructions     At [facility], we strive to deliver an exceptional experience to you, every time we see you.  If you receive a survey in the mail, please send us back your thoughts. We really do value your feedback.      Suggested websites for health information:  Www.[place].org : Up to date and easily searchable information on multiple topics.  Www.medlineplus.gov : medication info, interactive tutorials, watch real surgeries online  Www.familydoctor.org : good info from the Academy of Family Physicians  Www.cdc.gov : public health info, travel advisories, epidemics (H1N1)  Www.aap.org : children's health info, normal development, vaccinations  Www.health.state.[place].us : [place] dept of health, public health issues in [place], N1N1    Your care team:     Family Medicine   LAVERNE Barnett MD Emily Bunt, APRN [facility]   S. Andrez Baldwin,  MD Destini Duran MD         Clinic hours: Monday - Wednesday 7 am-7 pm   Thursdays and Fridays 7 am-5 pm.     [facility]: Monday - Friday 11 am-9 pm,   Saturday and Sunday 9 am-5 pm.    [facility]: Monday -Thursday 8 am-8 pm; Friday 8 am-6 pm; Saturday and Sunday 9 am-5 pm.     [facility]: Monday - Thursday 8 am - 7 pm; Friday 8 am - 6 pm    Clinic: [phone]   [facility]: [phone]   [facility]: [phone]               Daily Pedro PA-C  [facility]

## 2018-05-29 NOTE — MR AVS SNAPSHOT
After Visit Summary   5/29/2018    Fred Núñez    MRN: [id number]           Patient Information     Date Of Birth          1975        Visit Information        Provider Department      5/29/2018 8:00 AM Daily Pedro PA-C [facility]        Today's Diagnoses     Viral pharyngitis    -  1    Throat pain          Care Instructions    At [facility], we strive to deliver an exceptional experience to you, every time we see you.  If you receive a survey in the mail, please send us back your thoughts. We really do value your feedback.      Suggested websites for health information:  Www.[employer].Advitech : Up to date and easily searchable information on multiple topics.  Www.medlineplus.gov : medication info, interactive tutorials, watch real surgeries online  Www.familydoctor.org : good info from the Academy of Family Physicians  Www.cdc.gov : public health info, travel advisories, epidemics (H1N1)  Www.aap.org : children's health info, normal development, vaccinations  Www.health.[facility].[place].us : [place] dept of health, public health issues in [place], N1N1    Your care team:     Family Medicine   LAVERNE Barnett MD Emily Bunt, APRN [facility]   S. MD Eli Sinclair MD Angela Wermerskirchen, MD         Clinic hours: Monday - Wednesday 7 am-7 pm   Thursdays and Fridays 7 am-5 pm.     [facility]: Monday - Friday 11 am-9 pm,   Saturday and Sunday 9 am-5 pm.    [facility]: Monday -Thursday 8 am-8 pm; Friday 8 am-6 pm; Saturday and Sunday 9 am-5 pm.     [facility]: Monday - Thursday 8 am - 7 pm; Friday 8 am - 6 pm    Clinic: [phone]   [facility]: [phone]   [facility]: [phone]                  Follow-ups after your visit        Who to contact     If you have questions or need follow up information about today's clinic visit or your  "schedule please contact [facility] directly at [phone].  Normal or non-critical lab and imaging results will be communicated to you by MyChart, letter or phone within 4 business days after the clinic has received the results. If you do not hear from us within 7 days, please contact the clinic through MyChart or phone. If you have a critical or abnormal lab result, we will notify you by phone as soon as possible.  Submit refill requests through [employer] or call your pharmacy and they will forward the refill request to us. Please allow 3 business days for your refill to be completed.          Additional Information About Your Visit        [employer] Information     [employer] gives you secure access to your electronic health record. If you see a primary care provider, you can also send messages to your care team and make appointments. If you have questions, please call your primary care clinic.  If you do not have a primary care provider, please call [phone] and they will assist you.        Care EveryWhere ID     This is your Care EveryWhere ID. This could be used by other organizations to access your [place] medical records  [id number]        Your Vitals Were     Pulse Temperature Respirations Height Pulse Oximetry BMI (Body Mass Index)    100 98.1  F (36.7  C) (Oral) 16 1.645 m (5' 4.75\") 99% 27.84 kg/m2       Blood Pressure from Last 3 Encounters:   05/29/18 122/78   11/22/17 120/68   11/06/17 136/68    Weight from Last 3 Encounters:   05/29/18 75.3 kg (166 lb)   11/22/17 72.6 kg (160 lb)   11/06/17 72 kg (158 lb 11.2 oz)              We Performed the Following     Strep, Rapid Screen        Primary Care Provider Office Phone # Fax #    Dolores Andrez Baldwin [phone] [address], [place] [address]        Equal Access to Services     PAO SLAUGHTER AH: Hadii mike gallegoso Socandis, waaxda luqadaha, qaybta kaalmawaqar echeverria " laridge alba. So [facility] [phone].    ATENCIÓN: Si habla zoltan, tiene a crenshaw disposición servicios gratuitos de asistencia lingüística. Milady al [phone].    We comply with applicable federal civil rights laws and [place] laws. We do not discriminate on the basis of race, color, national origin, age, disability, sex, sexual orientation, or gender identity.            Thank you!     Thank you for choosing [facility]  for your care. Our goal is always to provide you with excellent care. Hearing back from our patients is one way we can continue to improve our services. Please take a few minutes to complete the written survey that you may receive in the mail after your visit with us. Thank you!             Your Updated Medication List - Protect others around you: Learn how to safely use, store and throw away your medicines at www.disposemymeds.org.          This list is accurate as of 5/29/18  8:30 AM.  Always use your most recent med list.                   Brand Name Dispense Instructions for use Diagnosis    amLODIPine 10 MG tablet    NORVASC    45 tablet    Take 0.5 tablets (5 mg) by mouth daily    Essential hypertension with goal blood pressure less than 140/90       atorvastatin 10 MG tablet    LIPITOR    90 tablet    Take 1 tablet (10 mg) by mouth At Bedtime    Hyperlipidemia LDL goal <100       blood glucose monitoring meter device kit     1 kit    Use to test blood sugars 2 times daily or as directed.    DM type 2, goal HbA1c < 7% (H), Hyperlipidemia LDL goal <100       blood glucose monitoring test strip    ACCU-CHEK SMARTVIEW    2 Box    Use to test blood sugar 2 times daily or as directed.    DM type 2, goal HbA1c < 7% (H), Hyperlipidemia LDL goal <100       lisinopril 2.5 MG tablet    PRINIVIL/Zestril    90 tablet    Take 1 tablet (2.5 mg) by mouth daily    Type 2 diabetes mellitus without complication, without long-term current use of insulin (H), Essential hypertension with goal blood  pressure less than 140/90       Magnesium-Potassium 250-100 MG Tabs       Migraine headache       metFORMIN 500 MG tablet    GLUCOPHAGE    90 tablet    Take 1 tablet (500 mg) by mouth daily (with dinner)    Type 2 diabetes mellitus without complication, without long-term current use of insulin (H)       Multi-vitamin Tabs tablet      Take 1 tablet by mouth daily        order for DME     1 each    Equipment being ordered: Blood pressure monitor. Check blood pressure every morning. Goal blood pressure: systolic less than 125; diastolic blood pressure less than 75.    HTN (hypertension)       polyethylene glycol powder    MIRALAX/GLYCOLAX    527 g    MIX 17 GRAM (1 CAPFUL) IN 4 TO 8 OUNCES OF LIQUID AND DRINK BY MOUTH DAILY    Constipation, unspecified constipation type       triamterene-hydrochlorothiazide 37.5-25 MG per capsule    DYAZIDE    90 capsule    Take 1 capsule by mouth daily    Essential hypertension with goal blood pressure less than 140/90

## 2018-05-30 LAB
BACTERIA SPEC CULT: NORMAL
SPECIMEN SOURCE: NORMAL

## 2018-05-30 NOTE — PROGRESS NOTES
Mely Ibarra   Your strep culture was negative.   Please call or MyChart my office with any questions or concerns.    Daily Pedro, PAC

## 2018-06-09 DIAGNOSIS — I10 ESSENTIAL HYPERTENSION WITH GOAL BLOOD PRESSURE LESS THAN 140/90: ICD-10-CM

## 2018-06-09 DIAGNOSIS — E11.9 TYPE 2 DIABETES MELLITUS WITHOUT COMPLICATION, WITHOUT LONG-TERM CURRENT USE OF INSULIN (H): ICD-10-CM

## 2018-06-11 RX ORDER — LISINOPRIL 2.5 MG/1
TABLET ORAL
Qty: 90 TABLET | Refills: 1 | Status: SHIPPED | OUTPATIENT
Start: 2018-06-11 | End: 2018-10-29

## 2018-06-11 NOTE — TELEPHONE ENCOUNTER
"Requested Prescriptions   Pending Prescriptions Disp Refills     lisinopril (PRINIVIL/ZESTRIL) 2.5 MG tablet [Pharmacy Med Name: LISINOPRIL 2.5 MG TABLET] 90 tablet 1     Sig: TAKE 1 TABLET BY MOUTH DAILY    ACE Inhibitors (Including Combos) Protocol Passed    6/9/2018  1:34 AM       Passed - Blood pressure under 140/90 in past 12 months    BP Readings from Last 3 Encounters:   05/29/18 122/78   11/22/17 120/68   11/06/17 136/68                Passed - Recent (12 mo) or future (30 days) visit within the authorizing provider's specialty    Patient had office visit in the last 12 months or has a visit in the next 30 days with authorizing provider or within the authorizing provider's specialty.  See \"Patient Info\" tab in inbasket, or \"Choose Columns\" in Meds & Orders section of the refill encounter.           Passed - Patient is age 18 or older       Passed - Normal serum creatinine on file in past 12 months    Recent Labs   Lab Test  11/16/17   0658   CR  0.85            Passed - Normal serum potassium on file in past 12 months    Recent Labs   Lab Test  11/16/17   0658   POTASSIUM  4.1             lisinopril (PRINIVIL/ZESTRIL) 2.5 MG tablet  Last Written Prescription Date:  11/22/17  Last Fill Quantity: 90,  # refills: 1   Last office visit: 5/29/2018 with prescribing provider:  Dr. Baldwin   Future Office Visit:      "

## 2018-07-02 DIAGNOSIS — E11.9 TYPE 2 DIABETES MELLITUS WITHOUT COMPLICATION, WITHOUT LONG-TERM CURRENT USE OF INSULIN (H): ICD-10-CM

## 2018-07-03 NOTE — TELEPHONE ENCOUNTER
Prescription approved per [facility] Refill Protocol.    Betina Coleman RN, BSN

## 2018-07-03 NOTE — TELEPHONE ENCOUNTER
".  Requested Prescriptions   Pending Prescriptions Disp Refills     metFORMIN (GLUCOPHAGE) 500 MG tablet [Pharmacy Med Name: METFORMIN HCL TABS 500MG]  Last Written Prescription Date:  4/17/18  Last Fill Quantity: 90 tablet,  # refills: 0   Last office visit: 5/29/2018 with prescribing provider:  Dr. Baldwin   Future Office Visit:   90 tablet 0     Sig: TAKE 1 TABLET DAILY WITH DINNER (DOSE CHANGE)    Biguanide Agents Passed    7/2/2018  8:30 PM       Passed - Blood pressure less than 140/90 in past 6 months    BP Readings from Last 3 Encounters:   05/29/18 122/78   11/22/17 120/68   11/06/17 136/68                Passed - Patient has documented LDL within the past 12 mos.    Recent Labs   Lab Test  11/16/17   0658   LDL  65            Passed - Patient has had a Microalbumin in the past 12 mos.    Recent Labs   Lab Test  11/16/17   0703   MICROL  5   UMALCR  3.84            Passed - Patient is age 10 or older       Passed - Patient has documented A1c within the specified period of time.    If HgbA1C is 8 or greater, it needs to be on file within the past 3 months.  If less than 8, must be on file within the past 6 months.     Recent Labs   Lab Test  04/17/18   1736   A1C  5.6            Passed - Patient's CR is NOT>1.4 OR Patient's EGFR is NOT<45 within past 12 mos.    Recent Labs   Lab Test  11/16/17   0658   GFRESTIMATED  >90   GFRESTBLACK  >90       Recent Labs   Lab Test  11/16/17   0658   CR  0.85            Passed - Patient does NOT have a diagnosis of CHF.       Passed - Recent (6 mo) or future (30 days) visit within the authorizing provider's specialty    Patient had office visit in the last 6 months or has a visit in the next 30 days with authorizing provider or within the authorizing provider's specialty.  See \"Patient Info\" tab in inbasket, or \"Choose Columns\" in Meds & Orders section of the refill encounter.              "

## 2018-07-09 DIAGNOSIS — K59.00 CONSTIPATION, UNSPECIFIED CONSTIPATION TYPE: ICD-10-CM

## 2018-07-09 NOTE — TELEPHONE ENCOUNTER
"Requested Prescriptions   Pending Prescriptions Disp Refills     polyethylene glycol (MIRALAX/GLYCOLAX) powder [Pharmacy Med Name: POLYETHYLENE GLYCOL 3350 POWD]  Last Written Prescription Date:  05/09/18  Last Fill Quantity: 527g,  # refills: 1   Last Office Visit with [facility], [facility] or [facility] prescribing provider:  05/29/18   Future Office Visit:    527 g 1     Sig: MIX 17 GRAM (1 CAPFUL) IN 4 TO 8 OUNCES OF LIQUID AND DRINK BY MOUTH DAILY    Laxatives Protocol Passed    7/9/2018  1:44 AM       Passed - Patient is age 6 or older       Passed - Recent (12 mo) or future (30 days) visit within the authorizing provider's specialty    Patient had office visit in the last 12 months or has a visit in the next 30 days with authorizing provider or within the authorizing provider's specialty.  See \"Patient Info\" tab in inbasket, or \"Choose Columns\" in Meds & Orders section of the refill encounter.              "

## 2018-07-11 RX ORDER — POLYETHYLENE GLYCOL 3350 17 G/17G
POWDER, FOR SOLUTION ORAL
Qty: 527 G | Refills: 1 | Status: SHIPPED | OUTPATIENT
Start: 2018-07-11 | End: 2018-09-07

## 2018-07-11 NOTE — TELEPHONE ENCOUNTER
Prescription approved per [facility] [place] Refill Protocol.    Carmen Robbins RN, [facility]

## 2018-07-19 ENCOUNTER — OFFICE VISIT (OUTPATIENT)
Dept: FAMILY MEDICINE | Facility: CLINIC | Age: 43
End: 2018-07-19
Payer: COMMERCIAL

## 2018-07-19 VITALS
HEIGHT: 65 IN | BODY MASS INDEX: 27.99 KG/M2 | DIASTOLIC BLOOD PRESSURE: 78 MMHG | SYSTOLIC BLOOD PRESSURE: 122 MMHG | HEART RATE: 103 BPM | WEIGHT: 168 LBS | OXYGEN SATURATION: 99 % | TEMPERATURE: 97.9 F

## 2018-07-19 DIAGNOSIS — M75.21 BICEPS TENDONITIS, RIGHT: Primary | ICD-10-CM

## 2018-07-19 DIAGNOSIS — Z13.89 SCREENING FOR DIABETIC PERIPHERAL NEUROPATHY: ICD-10-CM

## 2018-07-19 DIAGNOSIS — I10 ESSENTIAL HYPERTENSION WITH GOAL BLOOD PRESSURE LESS THAN 140/90: ICD-10-CM

## 2018-07-19 DIAGNOSIS — M77.8 TENDINITIS OF RIGHT TRICEPS: ICD-10-CM

## 2018-07-19 DIAGNOSIS — E78.5 HYPERLIPIDEMIA LDL GOAL <100: ICD-10-CM

## 2018-07-19 PROCEDURE — 99214 OFFICE O/P EST MOD 30 MIN: CPT | Performed by: PHYSICIAN ASSISTANT

## 2018-07-19 PROCEDURE — 99207 C FOOT EXAM  NO CHARGE: CPT | Performed by: PHYSICIAN ASSISTANT

## 2018-07-19 RX ORDER — TRIAMTERENE AND HYDROCHLOROTHIAZIDE 37.5; 25 MG/1; MG/1
1 CAPSULE ORAL DAILY
Qty: 90 CAPSULE | Refills: 0 | Status: SHIPPED | OUTPATIENT
Start: 2018-07-19 | End: 2018-10-29

## 2018-07-19 RX ORDER — ATORVASTATIN CALCIUM 10 MG/1
10 TABLET, FILM COATED ORAL AT BEDTIME
Qty: 90 TABLET | Refills: 0 | Status: SHIPPED | OUTPATIENT
Start: 2018-07-19 | End: 2018-10-29

## 2018-07-19 RX ORDER — AMLODIPINE BESYLATE 5 MG/1
5 TABLET ORAL DAILY
Qty: 90 TABLET | Refills: 0 | Status: SHIPPED | OUTPATIENT
Start: 2018-07-19 | End: 2018-10-29

## 2018-07-19 RX ORDER — AMLODIPINE BESYLATE 10 MG/1
5 TABLET ORAL DAILY
Qty: 45 TABLET | Refills: 1 | Status: CANCELLED | OUTPATIENT
Start: 2018-07-19

## 2018-07-19 RX ORDER — HYDROCODONE BITARTRATE AND ACETAMINOPHEN 5; 325 MG/1; MG/1
1 TABLET ORAL EVERY 6 HOURS PRN
Qty: 10 TABLET | Refills: 0 | Status: SHIPPED | OUTPATIENT
Start: 2018-07-19 | End: 2018-09-07

## 2018-07-19 NOTE — MR AVS SNAPSHOT
After Visit Summary   7/19/2018    Fred Núñez    MRN: [id number]           Patient Information     Date Of Birth          1975        Visit Information        Provider Department      7/19/2018 11:00 AM Daily Pedro PA-C [facility]        Today's Diagnoses     Biceps tendonitis, right    -  1    Screening for HIV (human immunodeficiency virus)        Screening for diabetic peripheral neuropathy        Essential hypertension with goal blood pressure less than 140/90        Hyperlipidemia LDL goal <100        Tendinitis of right triceps          Care Instructions    Take ibuprofen 3 over the counter tablets four times a day with food for 7-10 days.  Take vicodin only if severe pain   Schedule physical therapy with [facility] ([phone]).  They have several locations around the [facility].     Follow up with orthopedics if no improvement   Follow up with Dr. Baldwin in 3 months for diabetes recheck . You are due for fasting labs at that time.  I recommend scheduling lab appointment 3 days before appointment with Dr. Baldwin    Return urgently if any change in symptoms or concerns.              Follow-ups after your visit        Additional Services     [facility] PT, HAND, AND CHIROPRACTIC REFERRAL       === This order will print in the [facility] Scheduling  Office ===    Physical therapy, hand therapy and chiropractic care are available through:    [facility]  [facility]    Call one easy number to schedule at any of the above locations:  [phone].    Your provider has referred you to Physical Therapy at [facility] or [facility]    Indication/Reason for Referral: biceps and triceps tendonitis   Onset of Illness:  Minor discomfort one year but excruciating last 2 days   Therapy Orders:  Evaluate and Treat  Special Programs:  None  Special Request:  None    Additional Comments for the therapist or  chiropractor:        Please be aware that coverage of these services is subject to the terms and limitations of your health insurance plan.  Call member services at your health plan with any benefit or coverage questions.      Please bring the following to your appointment:    >>   Your personal calendar for scheduling future appointments  >>   Comfortable clothing            ORTHOPEDICS ADULT REFERRAL       Your provider has referred you to: [facility]: [facility] [phone]   [email or web address]    Please be aware that coverage of these services is subject to the terms and limitations of your health insurance plan.  Call member services at your health plan with any benefit or coverage questions.      Please bring the following to your appointment:    >>   Any x-rays, CTs or MRIs which have been performed.  Contact the facility where they were done to arrange for  prior to your scheduled appointment.    >>   List of current medications   >>   This referral request   >>   Any documents/labs given to you for this referral                  Who to contact     If you have questions or need follow up information about today's clinic visit or your schedule please contact [facility] directly at [phone].  Normal or non-critical lab and imaging results will be communicated to you by MyChart, letter or phone within 4 business days after the clinic has received the results. If you do not hear from us within 7 days, please contact the clinic through MyChart or phone. If you have a critical or abnormal lab result, we will notify you by phone as soon as possible.  Submit refill requests through [employer] or call your pharmacy and they will forward the refill request to us. Please allow 3 business days for your refill to be completed.          Additional Information About Your  "Visit        [employer] Information     [employer] gives you secure access to your electronic health record. If you see a primary care provider, you can also send messages to your care team and make appointments. If you have questions, please call your primary care clinic.  If you do not have a primary care provider, please call [phone] and they will assist you.        Care EveryWhere ID     This is your Care EveryWhere ID. This could be used by other organizations to access your [facility] medical records  [id number]        Your Vitals Were     Pulse Temperature Height Pulse Oximetry BMI (Body Mass Index)       103 97.9  F (36.6  C) (Oral) 1.645 m (5' 4.75\") 99% 28.17 kg/m2        Blood Pressure from Last 3 Encounters:   07/19/18 122/78   05/29/18 122/78   11/22/17 120/68    Weight from Last 3 Encounters:   07/19/18 76.2 kg (168 lb)   05/29/18 75.3 kg (166 lb)   11/22/17 72.6 kg (160 lb)              We Performed the Following     FOOT EXAM  NO CHARGE [80448.114]     [facility] PT, HAND, AND CHIROPRACTIC REFERRAL     ORTHOPEDICS ADULT REFERRAL          Today's Medication Changes          These changes are accurate as of 7/19/18 11:16 AM.  If you have any questions, ask your nurse or doctor.               Start taking these medicines.        Dose/Directions    HYDROcodone-acetaminophen 5-325 MG per tablet   Commonly known as:  NORCO   Used for:  Biceps tendonitis, right, Tendinitis of right triceps   Started by:  Daily Pedro PA-C        Dose:  1 tablet   Take 1 tablet by mouth every 6 hours as needed for severe pain   Quantity:  10 tablet   Refills:  0         These medicines have changed or have updated prescriptions.        Dose/Directions    * amLODIPine 10 MG tablet   Commonly known as:  NORVASC   This may have changed:  Another medication with the same name was added. Make sure you understand how and when to take each.   Used for:  Essential hypertension with goal blood pressure less than 140/90   Changed by: "  Daily Pedro PA-C        Dose:  5 mg   Take 0.5 tablets (5 mg) by mouth daily   Quantity:  45 tablet   Refills:  1       * amLODIPine 5 MG tablet   Commonly known as:  NORVASC   This may have changed:  You were already taking a medication with the same name, and this prescription was added. Make sure you understand how and when to take each.   Used for:  Essential hypertension with goal blood pressure less than 140/90   Changed by:  Daily Pedro PA-C        Dose:  5 mg   Take 1 tablet (5 mg) by mouth daily   Quantity:  90 tablet   Refills:  0       * Notice:  This list has 2 medication(s) that are the same as other medications prescribed for you. Read the directions carefully, and ask your doctor or other care provider to review them with you.         Where to get your medicines      These medications were sent to [facility] [address] [address]     Phone:  [phone]     amLODIPine 5 MG tablet    atorvastatin 10 MG tablet    triamterene-hydrochlorothiazide 37.5-25 MG per capsule         Some of these will need a paper prescription and others can be bought over the counter.  Ask your nurse if you have questions.     Bring a paper prescription for each of these medications     HYDROcodone-acetaminophen 5-325 MG per tablet               Information about OPIOIDS     PRESCRIPTION OPIOIDS: WHAT YOU NEED TO KNOW   We gave you an opioid (narcotic) pain medicine. It is important to manage your pain, but opioids are not always the best choice. You should first try all the other options your care team gave you. Take this medicine for as short a time (and as few doses) as possible.     These medicines have risks:    DO NOT drive when on new or higher doses of pain medicine. These medicines can affect your alertness and reaction times, and you could be arrested for driving under the influence (DUI). If you need to use  opioids long-term, talk to your care team about driving.    DO NOT operate heave machinery    DO NOT do any other dangerous activities while taking these medicines.     DO NOT drink any alcohol while taking these medicines.      If the opioid prescribed includes acetaminophen, DO NOT take with any other medicines that contain acetaminophen. Read all labels carefully. Look for the word  acetaminophen  or  Tylenol.  Ask your pharmacist if you have questions or are unsure.    You can get addicted to pain medicines, especially if you have a history of addiction (chemical, alcohol or substance dependence). Talk to your care team about ways to reduce this risk.    Store your pills in a secure place, locked if possible. We will not replace any lost or stolen medicine. If you don t finish your medicine, please throw away (dispose) as directed by your pharmacist. The [place] Pollution Control Agency has more information about safe disposal: https://www.pca.[facility].[place].us/living-green/managing-unwanted-medications.     All opioids tend to cause constipation. Drink plenty of water and eat foods that have a lot of fiber, such as fruits, vegetables, prune juice, apple juice and high-fiber cereal. Take a laxative (Miralax, milk of magnesia, Colace, Senna) if you don t move your bowels at least every other day.          Primary Care Provider Office Phone # Fax #    Dolores Andrez Baldwin [phone] [address] [facility] [address]        Equal Access to Services     PAO SLAUGHTER : Hadii mike henao hadasho Sojaneeali, waaxda luqadaha, qaybta kaalmada adeegyada, waqar de la torre . So [facility] [phone].    ATENCIÓN: Si habla español, tiene a crenshaw disposición servicios gratuitos de asistencia lingüística. Llame al [phone].    We comply with applicable federal civil rights laws and [place] laws. We do not discriminate on the basis of race, color, national origin, age, disability,  sex, sexual orientation, or gender identity.            Thank you!     Thank you for choosing [facility]  for your care. Our goal is always to provide you with excellent care. Hearing back from our patients is one way we can continue to improve our services. Please take a few minutes to complete the written survey that you may receive in the mail after your visit with us. Thank you!             Your Updated Medication List - Protect others around you: Learn how to safely use, store and throw away your medicines at www.disposemymeds.org.          This list is accurate as of 7/19/18 11:16 AM.  Always use your most recent med list.                   Brand Name Dispense Instructions for use Diagnosis    * amLODIPine 10 MG tablet    NORVASC    45 tablet    Take 0.5 tablets (5 mg) by mouth daily    Essential hypertension with goal blood pressure less than 140/90       * amLODIPine 5 MG tablet    NORVASC    90 tablet    Take 1 tablet (5 mg) by mouth daily    Essential hypertension with goal blood pressure less than 140/90       atorvastatin 10 MG tablet    LIPITOR    90 tablet    Take 1 tablet (10 mg) by mouth At Bedtime    Hyperlipidemia LDL goal <100       blood glucose monitoring meter device kit     1 kit    Use to test blood sugars 2 times daily or as directed.    DM type 2, goal HbA1c < 7% (H), Hyperlipidemia LDL goal <100       blood glucose monitoring test strip    ACCU-CHEK SMARTVIEW    2 Box    Use to test blood sugar 2 times daily or as directed.    DM type 2, goal HbA1c < 7% (H), Hyperlipidemia LDL goal <100       HYDROcodone-acetaminophen 5-325 MG per tablet    NORCO    10 tablet    Take 1 tablet by mouth every 6 hours as needed for severe pain    Biceps tendonitis, right, Tendinitis of right triceps       lisinopril 2.5 MG tablet    PRINIVIL/Zestril    90 tablet    TAKE 1 TABLET BY MOUTH DAILY    Type 2 diabetes mellitus without complication, without long-term current use of insulin (H),  Essential hypertension with goal blood pressure less than 140/90       Magnesium-Potassium 250-100 MG Tabs       Migraine headache       metFORMIN 500 MG tablet    GLUCOPHAGE    90 tablet    TAKE 1 TABLET DAILY WITH DINNER (DOSE CHANGE)    Type 2 diabetes mellitus without complication, without long-term current use of insulin (H)       Multi-vitamin Tabs tablet      Take 1 tablet by mouth daily        OMEPRAZOLE PO      Take 20 mg by mouth daily        order for DME     1 each    Equipment being ordered: Blood pressure monitor. Check blood pressure every morning. Goal blood pressure: systolic less than 125; diastolic blood pressure less than 75.    HTN (hypertension)       polyethylene glycol powder    MIRALAX/GLYCOLAX    527 g    MIX 17 GRAM (1 CAPFUL) IN 4 TO 8 OUNCES OF LIQUID AND DRINK BY MOUTH DAILY    Constipation, unspecified constipation type       triamterene-hydrochlorothiazide 37.5-25 MG per capsule    DYAZIDE    90 capsule    Take 1 capsule by mouth daily    Essential hypertension with goal blood pressure less than 140/90       * Notice:  This list has 2 medication(s) that are the same as other medications prescribed for you. Read the directions carefully, and ask your doctor or other care provider to review them with you.

## 2018-07-19 NOTE — PATIENT INSTRUCTIONS
Take ibuprofen 3 over the counter tablets four times a day with food for 7-10 days.  Take vicodin only if severe pain   Schedule physical therapy with [facility] ([phone]).  They have several locations around the [facility].     Follow up with orthopedics if no improvement   Follow up with Dr. Baldwin in 3 months for diabetes recheck . You are due for fasting labs at that time.  I recommend scheduling lab appointment 3 days before appointment with Dr. Baldwin    Return urgently if any change in symptoms or concerns.

## 2018-07-19 NOTE — PROGRESS NOTES
SUBJECTIVE:   Fred Núñez is a 43 year old male who presents to clinic today for the following health issues:      Diabetes Follow-up      Patient is checking blood sugars: not at all    Diabetic concerns: None     Symptoms of hypoglycemia (low blood sugar): none     Paresthesias (numbness or burning in feet) or sores: No     Date of last diabetic eye exam: 10/2016-17    Diabetes Management Resources    Hyperlipidemia Follow-Up      Rate your low fat/cholesterol diet?: not monitoring fat    Taking statin?  Yes, no muscle aches from statin    Other lipid medications/supplements?:  none    Hypertension Follow-up      Outpatient blood pressures are not being checked.    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  Weight creeping up .  Need to get more exercise  Golf once a week.   Right shoulder pain right biceps- triceps for a year or so.  Long weekends away from work pain will improve    and works with mouse all the time.  Odd angles. Has standing desk and gets nice positioning at times.  Home use lap top and not consistent positions.   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  Improved but still very painful.     Took 2 ibuprofen and wouldn't take edge off.  Icy hot no relief.   Ibuprofen this am and much better.         Problem list and histories reviewed & adjusted, as indicated.  Additional history: as documented    Patient Active Problem List   Diagnosis     CARDIOVASCULAR SCREENING; LDL GOAL LESS THAN 160     Migraine headache     HTN, goal below 140/90     Essential hypertension with goal blood pressure less than 140/90     Type 2 diabetes mellitus without complication (H)     DM type 2, goal HbA1c < 7% (H)     Claustrophobia     Hyperlipidemia LDL goal <100     Past Surgical History:   Procedure Laterality Date     NO HISTORY OF SURGERY         Social History   Substance Use Topics     Smoking status: Former Smoker     Types: Cigarettes     Smokeless tobacco: Never Used     Alcohol use Yes      Comment: 3 to 4 drinks - 4 to 7 days a week     Family History   Problem Relation Age of Onset     Diabetes Mother      HEART DISEASE Father      Musculoskeletal Disorder Father          Current Outpatient Prescriptions   Medication Sig Dispense Refill            amLODIPine (NORVASC) 5 MG tablet Take 1 tablet (5 mg) by mouth daily 90 tablet 0     atorvastatin (LIPITOR) 10 MG tablet Take 1 tablet (10 mg) by mouth At Bedtime 90 tablet 0     blood glucose monitoring (ACCU-CHEK SMARTVIEW) test strip Use to test blood sugar 2 times daily or as directed. 2 Box 1     blood glucose monitoring (ONE TOUCH ULTRA 2) meter device kit Use to test blood sugars 2 times daily or as directed. 1 kit 0     HYDROcodone-acetaminophen (NORCO) 5-325 MG per tablet Take 1 tablet by mouth every 6 hours as needed for severe pain 10 tablet 0     lisinopril (PRINIVIL/ZESTRIL) 2.5 MG tablet TAKE 1 TABLET BY MOUTH DAILY 90 tablet 1     Magnesium-Potassium 250-100 MG TABS        metFORMIN (GLUCOPHAGE) 500 MG tablet TAKE 1 TABLET DAILY WITH DINNER (DOSE CHANGE) 90 tablet 0     multivitamin, therapeutic with minerals (MULTI-VITAMIN) TABS Take 1 tablet by mouth  daily       OMEPRAZOLE PO Take 20 mg by mouth daily       order for DME Equipment being ordered: Blood pressure monitor.  Check blood pressure every morning.  Goal blood pressure: systolic less than 125; diastolic blood pressure less than 75. 1 each 0     polyethylene glycol (MIRALAX/GLYCOLAX) powder MIX 17 GRAM (1 CAPFUL) IN 4 TO 8 OUNCES OF LIQUID AND DRINK BY MOUTH DAILY 527 g 1     triamterene-hydrochlorothiazide (DYAZIDE) 37.5-25 MG per capsule Take 1 capsule by mouth daily 90 capsule 0     No Known Allergies  Recent Labs   Lab Test  04/17/18   1736  11/16/17   0658  12/08/16   0746  12/08/16   0745  10/05/16   0803  09/30/16   1439   01/09/15   1510   A1C  5.6  5.4   --   6.0   --   7.4*   < >   --    LDL   --   65  45   --   80   --    --    --    HDL   --   46  40   --   32*   --    --    --    TRIG   --   215*  160*   --   382*   --    --    --    ALT   --    --    --   75*   --   76*   --   81*   CR   --   0.85   --   0.90   --   1.00   < >  1.12   GFRESTIMATED   --   >90   --   >90  Non  GFR Calc     --   81   < >  72   GFRESTBLACK   --   >90   --   >90   GFR Calc     --   >90   GFR Calc     < >  87   POTASSIUM   --   4.1   --   4.0   --   4.0   < >  4.0   TSH   --    --    --   2.24   --    --    --   1.72    < > = values in this interval not displayed.      BP Readings from Last 3 Encounters:   07/19/18 122/78   05/29/18 122/78   11/22/17 120/68    Wt Readings from Last 3 Encounters:   07/19/18 76.2 kg (168 lb)   05/29/18 75.3 kg (166 lb)   11/22/17 72.6 kg (160 lb)                    Reviewed and updated as needed this visit by clinical staff  Tobacco  Allergies  Med Hx  Surg Hx  Fam Hx  Soc Hx      Reviewed and updated as needed this visit by Provider  Tobacco  Allergies  Meds  Problems  Med Hx  Surg Hx  Fam Hx  Soc Hx          ROS:  Constitutional, HEENT, cardiovascular, pulmonary, gi and gu systems are negative, except as otherwise  "noted.    OBJECTIVE:     /78 (BP Location: Right arm, Patient Position: Sitting, Cuff Size: Adult Large)  Pulse 103  Temp 97.9  F (36.6  C) (Oral)  Ht 1.645 m (5' 4.75\")  Wt 76.2 kg (168 lb)  SpO2 99%  BMI 28.17 kg/m2  Body mass index is 28.17 kg/(m^2).  GENERAL: healthy, alert and no distress  NECK: no adenopathy, no asymmetry, masses, or scars and thyroid normal to palpation  RESP: lungs clear to auscultation - no rales, rhonchi or wheezes  CV: regular rate and rhythm, normal S1 S2, no S3 or S4, no murmur, click or rub, no peripheral edema and peripheral pulses strong  ABDOMEN: soft, nontender, no hepatosplenomegaly, no masses and bowel sounds normal  MS: normal range of motion of left shoulder and elbow without discomfort.  Tender anterior and posterior triceps and biceps. Strength +5/5 right upper extremity.  No erythema or warmth. no edema.    Diabetic foot exam: normal DP and PT pulses, no trophic changes or ulcerative lesions, normal sensory exam and normal monofilament exam    Diagnostic Test Results:  none     ASSESSMENT/PLAN:         BMI:   Estimated body mass index is 28.17 kg/(m^2) as calculated from the following:    Height as of this encounter: 1.645 m (5' 4.75\").    Weight as of this encounter: 76.2 kg (168 lb).   Weight management plan: Discussed healthy diet and exercise guidelines and patient will follow up in 3 months in clinic to re-evaluate.      1. Biceps tendonitis, right  Nothing on exam to suggest fracture and no history of direct trauma.  Recommended continue ibuprofen and sparing use of vicodin if needed.   Follow up with physical therapy and ortho if no improvement with physical therapy   - [facility], HAND, AND CHIROPRACTIC REFERRAL  - ORTHOPEDICS ADULT REFERRAL  - HYDROcodone-acetaminophen (NORCO) 5-325 MG per tablet; Take 1 tablet by mouth every 6 hours as needed for severe pain  Dispense: 10 tablet; Refill: 0    2. Tendinitis of right triceps  As above   - [facility] PT, HAND, AND " CHIROPRACTIC REFERRAL  - ORTHOPEDICS ADULT REFERRAL  - HYDROcodone-acetaminophen (NORCO) 5-325 MG per tablet; Take 1 tablet by mouth every 6 hours as needed for severe pain  Dispense: 10 tablet; Refill: 0    3. Essential hypertension with goal blood pressure less than 140/90  Blood pressure at goal.  Continue current medication. Was taking 1/2 tablet of 10 mg norvasc- will switch to 5 mg tablet. Follow up with primary in 3 months  - triamterene-hydrochlorothiazide (DYAZIDE) 37.5-25 MG per capsule; Take 1 capsule by mouth daily  Dispense: 90 capsule; Refill: 0  - amLODIPine (NORVASC) 5 MG tablet; Take 1 tablet (5 mg) by mouth daily  Dispense: 90 tablet; Refill: 0    4. Screening for diabetic peripheral neuropathy  Normal exam  - FOOT EXAM  NO CHARGE [35234.114]    5. Hyperlipidemia LDL goal <100  Lipids last checked 11/2017 with elevation in triglycerides.    - atorvastatin (LIPITOR) 10 MG tablet; Take 1 tablet (10 mg) by mouth At Bedtime  Dispense: 90 tablet; Refill: 0      Patient Instructions   Take ibuprofen 3 over the counter tablets four times a day with food for 7-10 days.  Take vicodin only if severe pain   Schedule physical therapy with [facility] ([phone]).  They have several locations around the [facility].     Follow up with orthopedics if no improvement   Follow up with Dr. Baldwin in 3 months for diabetes recheck . You are due for fasting labs at that time.  I recommend scheduling lab appointment 3 days before appointment with Dr. Baldwin    Return urgently if any change in symptoms or concerns.         CC chart to PCP for catherine Pedro PA-C  [facility]

## 2018-07-23 ENCOUNTER — THERAPY VISIT (OUTPATIENT)
Dept: PHYSICAL THERAPY | Facility: CLINIC | Age: 43
End: 2018-07-23
Payer: COMMERCIAL

## 2018-07-23 DIAGNOSIS — S16.1XXA CERVICAL STRAIN: ICD-10-CM

## 2018-07-23 DIAGNOSIS — M77.8 SHOULDER TENDINITIS, RIGHT: ICD-10-CM

## 2018-07-23 PROCEDURE — 97112 NEUROMUSCULAR REEDUCATION: CPT | Mod: GP | Performed by: PHYSICAL THERAPIST

## 2018-07-23 PROCEDURE — 97161 PT EVAL LOW COMPLEX 20 MIN: CPT | Mod: GP | Performed by: PHYSICAL THERAPIST

## 2018-07-23 PROCEDURE — 97110 THERAPEUTIC EXERCISES: CPT | Mod: GP | Performed by: PHYSICAL THERAPIST

## 2018-07-23 NOTE — PROGRESS NOTES
[facility] Initial Evaluation  Subjective:  Patient is a 43 year old male presenting with rehab right shoulder hpi.   Fred Núñez is a 43 year old male with a right shoulder condition.      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  Awkward set up at work and this strains the arm.  Was very severe last fall.  Shoulder was sore and then golfed last Tuesday and the next day could hardly raise the arm.  When could not lift the arm went to MD.  Last week could not get the pain to stop.      .    Patient reports pain:  Upper arm (posterior and lateral).    Pain is described as aching and is intermittent and reported as 9/10 (average 3/10).  Associated symptoms:  Loss of motion/stiffness and loss of strength. Pain is worse in the P.M. (after work).  Symptoms are exacerbated by using arm overhead, lying on extremity, lifting, using arm at shoulder level and using arm behind back (mousing at work 2-3 hours but has 8 hour work day, dressing upper and lower body, wants to golf) and relieved by NSAID's.  Since onset symptoms are gradually improving (really worsened last week, but better since then).  Special testing: none.      General health as reported by patient is good.  Pertinent medical history includes:  High blood pressure and diabetes (R hand dominant).  Medical allergies: no.  Other surgeries include:  None reported.  Current medications:  High blood pressure medication and anti-inflammatory (metformin for diabetes).  Current occupation is   .  Patient is working in normal job without restrictions.  Primary job tasks include:  Prolonged sitting (computer work, mousing 8 hours).    Barriers include:  None as reported by the patient.    Red flags:  Pain at night/rest.                        Objective:  System              Cervical/Thoracic  Evaluation    AROM:  AROM Cervical:    Flexion:            No loss  Extension:       Min loss  Rotation:         Left: no loss     Right: no loss  Side Bend:      Left: no loss     Right:  No loss provoked R shoulder pain (usually pain to upper arm)      Headaches: none  Cervical Myotomes:                C8 (Thumb Ext): Left: 5    Right: 5  T1 (Intrinsics): Left: 5    Right: 5      Cervical Dermatomes:        C3 left:  Normal-light touch     C3 right:  Hypo-light touch    C4 left:  Normal-light touch     C4 right:  Hypo-light touch    C5 left:  Normal-light touch     C5 right:  Hypo-light touch    C6 left:  Normal-light touch     C6 right:  Hypo-light touch    C7 left:  Normal-light touch     C7 right:  Hypo-light touch    C8 left:  Normal-light touch     C8 right:  Hypo-light touch    T1 left:  Normal-light touch     T1 right:  Hypo-light touch                 Shoulder Evaluation:  ROM:  AROM:    Flexion:  Left:  150    Right:  120 PDM and ERP   Extension: Left: 62Right: 45  Abduction:  Left: 155   Right:  108 +ERP      External Rotation:  Left:  75    Right:  25 +ERP            Extension/Internal Rotation:  Left:  T7    Right:  T10 + ERP          Strength:  : MMT supraspinatus empty can L 5/5, R 4/5, full can L 5/5, R 4+/5.  Flexion: Left:5/5   Pain:    Right: 4+/5  Weak/pain free     Pain:     Abduction:  Left: 5/5  Pain:    Right: 4+/5   Weak/pain free    Pain:    Internal Rotation:  Left:5/5     Pain:    Right: 5/5     Pain:  External Rotation:   Left:5/5     Pain:   Right:4+/5   Weak/pain free    Pain:        Elbow Flexion:  Left:5/5     Pain:    Right:5/5     Pain:  Elbow Extension:  Left:5/5     Pain:    Right:5/5     Pain:    Special Tests:  Special tests assessed shoulder: (-) speeds test for pain, pt perceives weakness.    Left shoulder negative for the following special tests:  Labral and Impingement  Right shoulder positive for the following special tests:Impingement (+) mina davis, bang  ADD  Right shoulder negative for the following special tests:Labral                                       Cata Cervical Evaluation    Posture:  Sitting: fair  Standing: fair  Protruding Head: yes  Wry Neck: no  Correction of Posture: no effect  Other Observations: patient initially says no tingling or numbness, but after shoulder ROM/strength testing T/N provoked in R forearm/hand ulnar side to 5th digit.  R shoulder pain lessens after 2 sets repeated retraction, shoulder flexion then improved from 120 to 145 and shoulder ER improved from 25 to 70, NE on tingling in hand.  After repeated retraction extension symptoms significantly lessened in R shoulder and abolished tingling in hand.      Test Movements:  Pretest Pain Sitting: 3/10 R shoulder and upper arm pain    RET: During: no effect  After: no effect    Repeat RET: During: no effect  After: better  Mechanical Response: IncROM  RET EXT: During: no effect  After: no effect    Repeat RET EXT: During: decreases  After: better  Mechanical Response: IncROM                        Conclusion: derangement (cervical derangement unilateral asymmetrical below above)  Principle of Treatment:  Posture Correction: Educate keep neutral spine, use of lumbar support, computer ergonomics, avoid prolonged looking down, etc    Extension: cervical retraction extension x 10-15 reps every 2-3 hours or prn for relief                                             ROS    Assessment/Plan:    Patient is a 43 year old male with right side shoulder complaints.  Evaluation reveals cervical derangement that lessens R shoulder/arm pain and tingling/numbness.  Will begin treatment with neck and progress as needed with response.   Patient has the following significant findings with corresponding treatment plan.                Diagnosis 1:  R shoulder biceps and triceps tendonitis, cervical derangement    Pain -  manual therapy, self management, education, directional preference exercise and  home program  Decreased ROM/flexibility - manual therapy, therapeutic exercise and home program  Decreased strength - therapeutic exercise, therapeutic activities and home program  Decreased proprioception - neuro re-education, therapeutic activities and home program  Decreased function - therapeutic activities and home program  Impaired posture - neuro re-education and home program    Therapy Evaluation Codes:   1) History comprised of:   Personal factors that impact the plan of care:      Profession and Time since onset of symptoms.    Comorbidity factors that impact the plan of care are:      Numbness/tingling, Pain at night/rest and Weakness.     Medications impacting care: Anti-inflammatory.  2) Examination of Body Systems comprised of:   Body structures and functions that impact the plan of care:      Cervical spine and Shoulder.   Activity limitations that impact the plan of care are:      Dressing, Lifting, Sports, Working, Laying down and reaching.  3) Clinical presentation characteristics are:   Evolving/Changing.  4) Decision-Making    Low complexity using standardized patient assessment instrument and/or measureable assessment of functional outcome.  Cumulative Therapy Evaluation is: Low complexity.    Previous and current functional limitations:  (See Goal Flow Sheet for this information)    Short term and Long term goals: (See Goal Flow Sheet for this information)     Communication ability:  Patient appears to be able to clearly communicate and understand verbal and written communication and follow directions correctly.  Treatment Explanation - The following has been discussed with the patient:   RX ordered/plan of care  Anticipated outcomes  Possible risks and side effects  This patient would benefit from PT intervention to resume normal activities.   Rehab potential is good.    Frequency:  1 X week, once daily  Duration:  for 8 weeks  Discharge Plan:  Achieve all LTG.  Independent in home treatment  program.  Reach maximal therapeutic benefit.    Please refer to the daily flowsheet for treatment today, total treatment time and time spent performing 1:1 timed codes.

## 2018-08-02 ENCOUNTER — THERAPY VISIT (OUTPATIENT)
Dept: PHYSICAL THERAPY | Facility: CLINIC | Age: 43
End: 2018-08-02
Payer: COMMERCIAL

## 2018-08-02 DIAGNOSIS — M77.8 SHOULDER TENDINITIS, RIGHT: ICD-10-CM

## 2018-08-02 DIAGNOSIS — S16.1XXA CERVICAL STRAIN: ICD-10-CM

## 2018-08-02 PROCEDURE — 97110 THERAPEUTIC EXERCISES: CPT | Mod: GP | Performed by: PHYSICAL THERAPIST

## 2018-08-02 PROCEDURE — 97140 MANUAL THERAPY 1/> REGIONS: CPT | Mod: GP | Performed by: PHYSICAL THERAPIST

## 2018-08-09 ENCOUNTER — THERAPY VISIT (OUTPATIENT)
Dept: PHYSICAL THERAPY | Facility: CLINIC | Age: 43
End: 2018-08-09
Payer: COMMERCIAL

## 2018-08-09 DIAGNOSIS — S16.1XXA CERVICAL STRAIN: ICD-10-CM

## 2018-08-09 DIAGNOSIS — M77.8 SHOULDER TENDINITIS, RIGHT: ICD-10-CM

## 2018-08-09 PROCEDURE — 97110 THERAPEUTIC EXERCISES: CPT | Mod: GP | Performed by: PHYSICAL THERAPIST

## 2018-08-09 PROCEDURE — 97140 MANUAL THERAPY 1/> REGIONS: CPT | Mod: GP | Performed by: PHYSICAL THERAPIST

## 2018-08-23 ENCOUNTER — THERAPY VISIT (OUTPATIENT)
Dept: PHYSICAL THERAPY | Facility: CLINIC | Age: 43
End: 2018-08-23
Payer: COMMERCIAL

## 2018-08-23 DIAGNOSIS — S16.1XXA CERVICAL STRAIN: ICD-10-CM

## 2018-08-23 DIAGNOSIS — M77.8 SHOULDER TENDINITIS, RIGHT: ICD-10-CM

## 2018-08-23 PROCEDURE — 97110 THERAPEUTIC EXERCISES: CPT | Mod: GP | Performed by: PHYSICAL THERAPIST

## 2018-08-23 PROCEDURE — 97140 MANUAL THERAPY 1/> REGIONS: CPT | Mod: GP | Performed by: PHYSICAL THERAPIST

## 2018-08-23 NOTE — MR AVS SNAPSHOT
After Visit Summary   8/23/2018    Fred Núñez    MRN: [id number]           Patient Information     Date Of Birth          1975        Visit Information        Provider Department      8/23/2018 3:30 PM Minnie Vila, PT [facility]        Today's Diagnoses     Shoulder tendinitis, right        Cervical strain           Follow-ups after your visit        Your next 10 appointments already scheduled     Sep 10, 2018  4:10 PM CDT   ANAYELI Extremity with Minnie Vila, PT   [facility] ([facility]  )    [address]  [facility] [address] [phone]              Who to contact     If you have questions or need follow up information about today's clinic visit or your schedule please contact [facility] directly at [phone].  Normal or non-critical lab and imaging results will be communicated to you by [employer], letter or phone within 4 business days after the clinic has received the results. If you do not hear from us within 7 days, please contact the clinic through [employer] or phone. If you have a critical or abnormal lab result, we will notify you by phone as soon as possible.  Submit refill requests through [employer] or call your pharmacy and they will forward the refill request to us. Please allow 3 business days for your refill to be completed.          Additional Information About Your Visit        MyChart Information     [employer] gives you secure access to your electronic health record. If you see a primary care provider, you can also send messages to your care team and make appointments. If you have questions, please call your primary care clinic.  If you do not have a primary care provider, please call [phone] and they will assist you.        Care EveryWhere ID     This is your Care EveryWhere ID. This could be used by other organizations to access your  [place] medical records  [id number]         Blood Pressure from Last 3 Encounters:   07/19/18 122/78   05/29/18 122/78   11/22/17 120/68    Weight from Last 3 Encounters:   07/19/18 76.2 kg (168 lb)   05/29/18 75.3 kg (166 lb)   11/22/17 72.6 kg (160 lb)              We Performed the Following     MANUAL THER TECH,1+REGIONS,EA 15 MIN     THERAPEUTIC EXERCISES        Primary Care Provider Office Phone # Fax #    Dolores Andrez Baldwin [phone] [address] [facility] [address]        Equal Access to Services     AMRIT SLAUGHTER : Hadii aad ku hadasho Soomaali, waaxda luqadaha, qaybta kaalmada adeegyada, waxyobany de la torre . So [facility] [phone].    ATENCIÓN: Si habla español, tiene a crenshaw disposición servicios gratuitos de asistencia lingüística. Llame al [phone].    We comply with applicable federal civil rights laws and [place] laws. We do not discriminate on the basis of race, color, national origin, age, disability, sex, sexual orientation, or gender identity.            Thank you!     Thank you for choosing [facility]  for your care. Our goal is always to provide you with excellent care. Hearing back from our patients is one way we can continue to improve our services. Please take a few minutes to complete the written survey that you may receive in the mail after your visit with us. Thank you!             Your Updated Medication List - Protect others around you: Learn how to safely use, store and throw away your medicines at www.disposemymeds.org.          This list is accurate as of 8/23/18  4:37 PM.  Always use your most recent med list.                   Brand Name Dispense Instructions for use Diagnosis    amLODIPine 5 MG tablet    NORVASC    90 tablet    Take 1 tablet (5 mg) by mouth daily    Essential hypertension with goal blood pressure less than 140/90       atorvastatin 10 MG tablet    LIPITOR    90  tablet    Take 1 tablet (10 mg) by mouth At Bedtime    Hyperlipidemia LDL goal <100       blood glucose monitoring meter device kit     1 kit    Use to test blood sugars 2 times daily or as directed.    DM type 2, goal HbA1c < 7% (H), Hyperlipidemia LDL goal <100       blood glucose monitoring test strip    ACCU-CHEK SMARTVIEW    2 Box    Use to test blood sugar 2 times daily or as directed.    DM type 2, goal HbA1c < 7% (H), Hyperlipidemia LDL goal <100       HYDROcodone-acetaminophen 5-325 MG per tablet    NORCO    10 tablet    Take 1 tablet by mouth every 6 hours as needed for severe pain    Biceps tendonitis, right, Tendinitis of right triceps       lisinopril 2.5 MG tablet    PRINIVIL/Zestril    90 tablet    TAKE 1 TABLET BY MOUTH DAILY    Type 2 diabetes mellitus without complication, without long-term current use of insulin (H), Essential hypertension with goal blood pressure less than 140/90       Magnesium-Potassium 250-100 MG Tabs       Migraine headache       metFORMIN 500 MG tablet    GLUCOPHAGE    90 tablet    TAKE 1 TABLET DAILY WITH DINNER (DOSE CHANGE)    Type 2 diabetes mellitus without complication, without long-term current use of insulin (H)       Multi-vitamin Tabs tablet      Take 1 tablet by mouth daily        OMEPRAZOLE PO      Take 20 mg by mouth daily        order for DME     1 each    Equipment being ordered: Blood pressure monitor. Check blood pressure every morning. Goal blood pressure: systolic less than 125; diastolic blood pressure less than 75.    HTN (hypertension)       polyethylene glycol powder    MIRALAX/GLYCOLAX    527 g    MIX 17 GRAM (1 CAPFUL) IN 4 TO 8 OUNCES OF LIQUID AND DRINK BY MOUTH DAILY    Constipation, unspecified constipation type       triamterene-hydrochlorothiazide 37.5-25 MG per capsule    DYAZIDE    90 capsule    Take 1 capsule by mouth daily    Essential hypertension with goal blood pressure less than 140/90

## 2018-09-04 ENCOUNTER — OFFICE VISIT (OUTPATIENT)
Dept: FAMILY MEDICINE | Facility: CLINIC | Age: 43
End: 2018-09-04
Payer: COMMERCIAL

## 2018-09-04 VITALS
RESPIRATION RATE: 16 BRPM | HEIGHT: 65 IN | HEART RATE: 106 BPM | TEMPERATURE: 98.4 F | SYSTOLIC BLOOD PRESSURE: 135 MMHG | WEIGHT: 166 LBS | DIASTOLIC BLOOD PRESSURE: 78 MMHG | OXYGEN SATURATION: 96 % | BODY MASS INDEX: 27.66 KG/M2

## 2018-09-04 DIAGNOSIS — M54.41 ACUTE RIGHT-SIDED LOW BACK PAIN WITH RIGHT-SIDED SCIATICA: Primary | ICD-10-CM

## 2018-09-04 PROCEDURE — 99214 OFFICE O/P EST MOD 30 MIN: CPT | Performed by: NURSE PRACTITIONER

## 2018-09-04 RX ORDER — CYCLOBENZAPRINE HCL 5 MG
5-10 TABLET ORAL 3 TIMES DAILY PRN
Qty: 30 TABLET | Refills: 0 | Status: SHIPPED | OUTPATIENT
Start: 2018-09-04 | End: 2018-09-07

## 2018-09-04 RX ORDER — DICLOFENAC SODIUM 75 MG/1
75 TABLET, DELAYED RELEASE ORAL 2 TIMES DAILY PRN
Qty: 30 TABLET | Refills: 0 | Status: SHIPPED | OUTPATIENT
Start: 2018-09-04 | End: 2018-10-01

## 2018-09-04 ASSESSMENT — PAIN SCALES - GENERAL: PAINLEVEL: SEVERE PAIN (6)

## 2018-09-04 NOTE — PATIENT INSTRUCTIONS
Ice or heat 15 minutes 4-6 times daily  Gentle stretching  Movement is key for early relief of back pain  Start diclofenac 1 tab twice daily as needed for pain - don't take with ibuprofen or naproxen  Start cyclobenzaprine 1-2 tabs up to 3 times daily as needed for back pain/spasms. No driving, operating machinery or drinking alcohol while taking  If worsening or not improving in 1 week, follow up with primary care provider, sooner if needed.  If you develop loss of bowel or bladder, numbness in the groin or thighs, or foot drop, go to emergency department.      At [facility], we strive to deliver an exceptional experience to you, every time we see you.  If you receive a survey in the mail, please send us back your thoughts. We really do value your feedback.    Based on your medical history, these are the current health maintenance/preventive care services that you are due for (some may have been done at this visit.)  Health Maintenance Due   Topic Date Due     HIV SCREEN (SYSTEM ASSIGNED)  06/25/1991     EYE EXAM Q1 YEAR  01/23/2018     INFLUENZA VACCINE (1) 09/01/2018         Suggested websites for health information:  Www.mnlakeplace.com.Curiously : Up to date and easily searchable information on multiple topics.  Www.medlineplus.gov : medication info, interactive tutorials, watch real surgeries online  Www.familydoctor.org : good info from the Academy of Family Physicians  Www.cdc.gov : public health info, travel advisories, epidemics (H1N1)  Www.aap.org : children's health info, normal development, vaccinations  Www.health.[facility].[place].us : [place] dept of health, public health issues in [place], N1N1    Your care team:                            Family Medicine Internal Medicine   MD Norm Garcia MD Shantel Branch-Fleming, MD Katya Georgiev PA-C Nam Ho, MD Pediatrics   LAVERNE English, MD Yasmin Cottrell CNP, MD Deborah Mielke,  MD Liberty Mckeon, YANELI CNP      Clinic hours: Monday - Thursday 7 am-7 pm; Fridays 7 am-5 pm.   Urgent care: Monday - Friday 11 am-9 pm; Saturday and Sunday 9 am-5 pm.  Pharmacy : Monday -Thursday 8 am-8 pm; Friday 8 am-6 pm; Saturday and Sunday 9 am-5 pm.     Clinic: [phone]   Pharmacy: [phone]    Back Pain (Acute or Chronic)    Back pain is one of the most common problems. The good news is that most people feel better in 1 to 2 weeks, and most of the rest in 1 to 2 months. Most people can remain active.  People experience and describe pain differently; not everyone is the same.    The pain can be sharp, stabbing, shooting, aching, cramping or burning.    Movement, standing, bending, lifting, sitting, or walking may worsen pain.    It can be localized to one spot or area, or it can be more generalized.    It can spread or radiate upwards, to the front, or go down your arms or legs (sciatica).    It can cause muscle spasm.  Most of the time, mechanical problems with the muscles or spine cause the pain. Mechanical problems are usually caused by an injury to the muscles or ligaments. While illness can cause back pain, it is usually not caused by a serious illness. Mechanical problems include:     Physical activity such as sports, exercise, work, or normal activity    Overexertion, lifting, pushing, pulling incorrectly or too aggressively    Sudden twisting, bending, or stretching from an accident, or accidental movement    Poor posture    Stretching or moving wrong, without noticing pain at the time    Poor coordination, lack of regular exercise (check with your doctor about this)    Spinal disc disease or arthritis    Stress  Pain can also be related to pregnancy, or illness like appendicitis, bladder or kidney infections, pelvic infections, and many other things.  Acute back pain usually gets better in 1 to 2 weeks. Back pain related to disk disease, arthritis in the spinal joints or spinal stenosis  (narrowing of the spinal canal) can become chronic and last for months or years.  Unless you had a physical injury (for example, a car accident or fall) X-rays are usually not needed for the initial evaluation of back pain. If pain continues and does not respond to medical treatment, X-rays and other tests may be needed.  Home care  Try these home care recommendations:    When in bed, try to find a position of comfort. A firm mattress is best. Try lying flat on your back with pillows under your knees. You can also try lying on your side with your knees bent up towards your chest and a pillow between your knees.    At first, do not try to stretch out the sore spots. If there is a strain, it is not like the good soreness you get after exercising without an injury. In this case, stretching may make it worse.    Avoid prolong sitting, long car rides, or travel. This puts more stress on the lower back than standing or walking.    During the first 24 to 72 hours after an acute injury or flare up of chronic back pain, apply an ice pack to the painful area for 20 minutes and then remove it for 20 minutes. Do this over a period of 60 to 90 minutes or several times a day. This will reduce swelling and pain. Wrap the ice pack in a thin towel or plastic to protect your skin.    You can start with ice, then switch to heat. Heat (hot shower, hot bath, or heating pad) reduces pain and works well for muscle spasms. Heat can be applied to the painful area for 20 minutes then remove it for 20 minutes. Do this over a period of 60 to 90 minutes or several times a day. Do not sleep on a heating pad. It can lead to skin burns or tissue damage.    You can alternate ice and heat therapy. Talk with your doctor about the best treatment for your back pain.    Therapeutic massage can help relax the back muscles without stretching them.    Be aware of safe lifting methods and do not lift anything without stretching first.  Medicines  Talk to  your doctor before using medicine, especially if you have other medical problems or are taking other medicines.    You may use over-the-counter medicine as directed on the bottle to control pain, unless another pain medicine was prescribed. If you have chronic conditions like diabetes, liver or kidney disease, stomach ulcers, or gastrointestinal bleeding, or are taking blood thinners, talk to your doctor before taking any medicine.    Be careful if you are given a prescription medicines, narcotics, or medicine for muscle spasms. They can cause drowsiness, affect your coordination, reflexes, and judgement. Do not drive or operate heavy machinery.  Follow-up care  Follow up with your healthcare provider, or as advised.   A radiologist will review any X-rays that were taken. Your provide will notify you of any new findings that may affect your care.  Call 911  Call emergency services if any of the following occur:    Trouble breathing    Confusion    Very drowsy or trouble awakening    Fainting or loss of consciousness    Rapid or very slow heart rate    Loss of bowel or bladder control  When to seek medical advice  Call your healthcare provider right away if any of these occur:     Pain becomes worse or spreads to your legs    Weakness or numbness in one or both legs    Numbness in the groin or genital area  Date Last Reviewed: 7/1/2016 2000-2017 [employer]. [address] [place] [address], [place] [address]. All rights reserved. This information is not intended as a substitute for professional medical care. Always follow your healthcare professional's instructions.

## 2018-09-04 NOTE — MR AVS SNAPSHOT
After Visit Summary   9/4/2018    Fred Núñez    MRN: [id number]           Patient Information     Date Of Birth          1975        Visit Information        Provider Department      9/4/2018 10:20 AM Terri Saucedo APRN CNP [facility]        Today's Diagnoses     Acute right-sided low back pain with right-sided sciatica    -  1      Care Instructions    Ice or heat 15 minutes 4-6 times daily  Gentle stretching  Movement is key for early relief of back pain  Start diclofenac 1 tab twice daily as needed for pain - don't take with ibuprofen or naproxen  Start cyclobenzaprine 1-2 tabs up to 3 times daily as needed for back pain/spasms. No driving, operating machinery or drinking alcohol while taking  If worsening or not improving in 1 week, follow up with primary care provider, sooner if needed.  If you develop loss of bowel or bladder, numbness in the groin or thighs, or foot drop, go to emergency department.      At [facility], we strive to deliver an exceptional experience to you, every time we see you.  If you receive a survey in the mail, please send us back your thoughts. We really do value your feedback.    Based on your medical history, these are the current health maintenance/preventive care services that you are due for (some may have been done at this visit.)  Health Maintenance Due   Topic Date Due     HIV SCREEN (SYSTEM ASSIGNED)  06/25/1991     EYE EXAM Q1 YEAR  01/23/2018     INFLUENZA VACCINE (1) 09/01/2018         Suggested websites for health information:  Www.[employer].org : Up to date and easily searchable information on multiple topics.  Www.medlineplus.gov : medication info, interactive tutorials, watch real surgeries online  Www.familydoctor.org : good info from the Academy of Family Physicians  Www.cdc.gov : public health info, travel advisories, epidemics (H1N1)  Www.aap.org : children's health info, normal development,  vaccinations  Www.health.[facility].[place].us : [place] dept of health, public health issues in [place], N1N1    Your care team:                            Family Medicine Internal Medicine   MD Norm Garcia MD Shantel Branch-Fleming, MD Katya Georgiev PA-C Nam Ho, MD Pediatrics   LAVERNE English, DAWSON Rivera APRN MD Yasmin Nava MD Deborah Mielke, MD Kim Thein, APRN [facility]      Clinic hours: Monday - Thursday 7 am-7 pm; Fridays 7 am-5 pm.   Urgent care: Monday - Friday 11 am-9 pm; Saturday and Sunday 9 am-5 pm.  Pharmacy : Monday -Thursday 8 am-8 pm; Friday 8 am-6 pm; Saturday and Sunday 9 am-5 pm.     Clinic: [phone]   Pharmacy: [phone]    Back Pain (Acute or Chronic)    Back pain is one of the most common problems. The good news is that most people feel better in 1 to 2 weeks, and most of the rest in 1 to 2 months. Most people can remain active.  People experience and describe pain differently; not everyone is the same.    The pain can be sharp, stabbing, shooting, aching, cramping or burning.    Movement, standing, bending, lifting, sitting, or walking may worsen pain.    It can be localized to one spot or area, or it can be more generalized.    It can spread or radiate upwards, to the front, or go down your arms or legs (sciatica).    It can cause muscle spasm.  Most of the time, mechanical problems with the muscles or spine cause the pain. Mechanical problems are usually caused by an injury to the muscles or ligaments. While illness can cause back pain, it is usually not caused by a serious illness. Mechanical problems include:     Physical activity such as sports, exercise, work, or normal activity    Overexertion, lifting, pushing, pulling incorrectly or too aggressively    Sudden twisting, bending, or stretching from an accident, or accidental movement    Poor posture    Stretching or moving wrong, without noticing pain at the  time    Poor coordination, lack of regular exercise (check with your doctor about this)    Spinal disc disease or arthritis    Stress  Pain can also be related to pregnancy, or illness like appendicitis, bladder or kidney infections, pelvic infections, and many other things.  Acute back pain usually gets better in 1 to 2 weeks. Back pain related to disk disease, arthritis in the spinal joints or spinal stenosis (narrowing of the spinal canal) can become chronic and last for months or years.  Unless you had a physical injury (for example, a car accident or fall) X-rays are usually not needed for the initial evaluation of back pain. If pain continues and does not respond to medical treatment, X-rays and other tests may be needed.  Home care  Try these home care recommendations:    When in bed, try to find a position of comfort. A firm mattress is best. Try lying flat on your back with pillows under your knees. You can also try lying on your side with your knees bent up towards your chest and a pillow between your knees.    At first, do not try to stretch out the sore spots. If there is a strain, it is not like the good soreness you get after exercising without an injury. In this case, stretching may make it worse.    Avoid prolong sitting, long car rides, or travel. This puts more stress on the lower back than standing or walking.    During the first 24 to 72 hours after an acute injury or flare up of chronic back pain, apply an ice pack to the painful area for 20 minutes and then remove it for 20 minutes. Do this over a period of 60 to 90 minutes or several times a day. This will reduce swelling and pain. Wrap the ice pack in a thin towel or plastic to protect your skin.    You can start with ice, then switch to heat. Heat (hot shower, hot bath, or heating pad) reduces pain and works well for muscle spasms. Heat can be applied to the painful area for 20 minutes then remove it for 20 minutes. Do this over a period of  60 to 90 minutes or several times a day. Do not sleep on a heating pad. It can lead to skin burns or tissue damage.    You can alternate ice and heat therapy. Talk with your doctor about the best treatment for your back pain.    Therapeutic massage can help relax the back muscles without stretching them.    Be aware of safe lifting methods and do not lift anything without stretching first.  Medicines  Talk to your doctor before using medicine, especially if you have other medical problems or are taking other medicines.    You may use over-the-counter medicine as directed on the bottle to control pain, unless another pain medicine was prescribed. If you have chronic conditions like diabetes, liver or kidney disease, stomach ulcers, or gastrointestinal bleeding, or are taking blood thinners, talk to your doctor before taking any medicine.    Be careful if you are given a prescription medicines, narcotics, or medicine for muscle spasms. They can cause drowsiness, affect your coordination, reflexes, and judgement. Do not drive or operate heavy machinery.  Follow-up care  Follow up with your healthcare provider, or as advised.   A radiologist will review any X-rays that were taken. Your provide will notify you of any new findings that may affect your care.  Call 911  Call emergency services if any of the following occur:    Trouble breathing    Confusion    Very drowsy or trouble awakening    Fainting or loss of consciousness    Rapid or very slow heart rate    Loss of bowel or bladder control  When to seek medical advice  Call your healthcare provider right away if any of these occur:     Pain becomes worse or spreads to your legs    Weakness or numbness in one or both legs    Numbness in the groin or genital area  Date Last Reviewed: 7/1/2016 2000-2017 [employer]. [address] [place] [address], [place] [address]. All rights reserved. This information is not intended as a substitute for professional medical  care. Always follow your healthcare professional's instructions.                Follow-ups after your visit        Additional Services     [facility] PT, HAND, AND CHIROPRACTIC REFERRAL       **This order will print in the [facility] Scheduling Office**    Physical Therapy, Hand Therapy and Chiropractic Care are available through:    *[facility]  *[facility]  *[facility]    Call one number to schedule at any of the above locations: [phone].    Your provider has referred you to: Physical Therapy at [facility] or [facility]    Indication/Reason for Referral: Low Back Pain  Onset of Illness: 8/31  Therapy Orders: Evaluate and Treat  Special Programs: None  Special Request: None    Nash Cao      Additional Comments for the Therapist or Chiropractor: patient already in therapy for R shoulder    Please be aware that coverage of these services is subject to the terms and limitations of your health insurance plan.  Call member services at your health plan with any benefit or coverage questions.      Please bring the following to your appointment:    *Your personal calendar for scheduling future appointments  *Comfortable clothing                  Your next 10 appointments already scheduled     Sep 10, 2018  4:10 PM CDT   [facility] Extremity with Minnie Vila, PT   [facility] ([facility] [place] [facility]  )    [address]  [facility] [address] [phone]              Who to contact     If you have questions or need follow up information about today's clinic visit or your schedule please contact [facility] directly at [phone].  Normal or non-critical lab and imaging results will be communicated to you by MyChart, letter or phone within 4 business days after the clinic has received the results. If you do not hear from us within 7 days, please contact the clinic through MyChart or phone. If you have a critical or abnormal  "lab result, we will notify you by phone as soon as possible.  Submit refill requests through [employer] or call your pharmacy and they will forward the refill request to us. Please allow 3 business days for your refill to be completed.          Additional Information About Your Visit        [employer] Information     [employer] gives you secure access to your electronic health record. If you see a primary care provider, you can also send messages to your care team and make appointments. If you have questions, please call your primary care clinic.  If you do not have a primary care provider, please call [phone] and they will assist you.        Care EveryWhere ID     This is your Care EveryWhere ID. This could be used by other organizations to access your [place] medical records  [id number]        Your Vitals Were     Pulse Temperature Respirations Height Pulse Oximetry BMI (Body Mass Index)    106 98.4  F (36.9  C) (Oral) 16 5' 4.75\" (1.645 m) 96% 27.84 kg/m2       Blood Pressure from Last 3 Encounters:   09/04/18 135/78   07/19/18 122/78   05/29/18 122/78    Weight from Last 3 Encounters:   09/04/18 166 lb (75.3 kg)   07/19/18 168 lb (76.2 kg)   05/29/18 166 lb (75.3 kg)              We Performed the Following     [facility] PT, HAND, AND CHIROPRACTIC REFERRAL          Today's Medication Changes          These changes are accurate as of 9/4/18 10:58 AM.  If you have any questions, ask your nurse or doctor.               Start taking these medicines.        Dose/Directions    cyclobenzaprine 5 MG tablet   Commonly known as:  FLEXERIL   Used for:  Acute right-sided low back pain with right-sided sciatica   Started by:  Terri Saucedo APRN CNP        Dose:  5-10 mg   Take 1-2 tablets (5-10 mg) by mouth 3 times daily as needed for muscle spasms   Quantity:  30 tablet   Refills:  0       diclofenac 75 MG EC tablet   Commonly known as:  VOLTAREN   Used for:  Acute right-sided low back pain with right-sided sciatica   Started " by:  Terri Saucedo APRN CNP        Dose:  75 mg   Take 1 tablet (75 mg) by mouth 2 times daily as needed for moderate pain   Quantity:  30 tablet   Refills:  0            Where to get your medicines      These medications were sent to [facility]/pharmacy #[id number] - [place], [place] - [address] [facility]  [address] [facility] [address]     Phone:  [phone]     cyclobenzaprine 5 MG tablet    diclofenac 75 MG EC tablet                Primary Care Provider Office Phone # Fax #    Doloreswu Baldwin [phone] [address] [place] [address]        Equal Access to Services     PAO SLAUGHTER : Beryl Fink, waaxda luqadaha, qaybta kaalmada adejaredyagarrett, waqar alba. So [facility] [phone].    ATENCIÓN: Si habla español, tiene a crenshaw disposición servicios gratuitos de asistencia lingüística. Ll[facility] [phone].    We comply with applicable federal civil rights laws and [place] laws. We do not discriminate on the basis of race, color, national origin, age, disability, sex, sexual orientation, or gender identity.            Thank you!     Thank you for choosing [facility]  for your care. Our goal is always to provide you with excellent care. Hearing back from our patients is one way we can continue to improve our services. Please take a few minutes to complete the written survey that you may receive in the mail after your visit with us. Thank you!             Your Updated Medication List - Protect others around you: Learn how to safely use, store and throw away your medicines at www.disposemymeds.org.          This list is accurate as of 9/4/18 10:58 AM.  Always use your most recent med list.                   Brand Name Dispense Instructions for use Diagnosis    amLODIPine 5 MG tablet    NORVASC    90 tablet    Take 1 tablet (5 mg) by mouth daily    Essential hypertension  with goal blood pressure less than 140/90       atorvastatin 10 MG tablet    LIPITOR    90 tablet    Take 1 tablet (10 mg) by mouth At Bedtime    Hyperlipidemia LDL goal <100       blood glucose monitoring meter device kit     1 kit    Use to test blood sugars 2 times daily or as directed.    DM type 2, goal HbA1c < 7% (H), Hyperlipidemia LDL goal <100       blood glucose monitoring test strip    ACCU-CHEK SMARTVIEW    2 Box    Use to test blood sugar 2 times daily or as directed.    DM type 2, goal HbA1c < 7% (H), Hyperlipidemia LDL goal <100       cyclobenzaprine 5 MG tablet    FLEXERIL    30 tablet    Take 1-2 tablets (5-10 mg) by mouth 3 times daily as needed for muscle spasms    Acute right-sided low back pain with right-sided sciatica       diclofenac 75 MG EC tablet    VOLTAREN    30 tablet    Take 1 tablet (75 mg) by mouth 2 times daily as needed for moderate pain    Acute right-sided low back pain with right-sided sciatica       HYDROcodone-acetaminophen 5-325 MG per tablet    NORCO    10 tablet    Take 1 tablet by mouth every 6 hours as needed for severe pain    Biceps tendonitis, right, Tendinitis of right triceps       lisinopril 2.5 MG tablet    PRINIVIL/Zestril    90 tablet    TAKE 1 TABLET BY MOUTH DAILY    Type 2 diabetes mellitus without complication, without long-term current use of insulin (H), Essential hypertension with goal blood pressure less than 140/90       Magnesium-Potassium 250-100 MG Tabs       Migraine headache       metFORMIN 500 MG tablet    GLUCOPHAGE    90 tablet    TAKE 1 TABLET DAILY WITH DINNER (DOSE CHANGE)    Type 2 diabetes mellitus without complication, without long-term current use of insulin (H)       Multi-vitamin Tabs tablet      Take 1 tablet by mouth daily        OMEPRAZOLE PO      Take 20 mg by mouth daily        order for DME     1 each    Equipment being ordered: Blood pressure monitor. Check blood pressure every morning. Goal blood pressure: systolic less than 125;  diastolic blood pressure less than 75.    HTN (hypertension)       polyethylene glycol powder    MIRALAX/GLYCOLAX    527 g    MIX 17 GRAM (1 CAPFUL) IN 4 TO 8 OUNCES OF LIQUID AND DRINK BY MOUTH DAILY    Constipation, unspecified constipation type       triamterene-hydrochlorothiazide 37.5-25 MG per capsule    DYAZIDE    90 capsule    Take 1 capsule by mouth daily    Essential hypertension with goal blood pressure less than 140/90

## 2018-09-04 NOTE — PROGRESS NOTES
SUBJECTIVE:   Fred Núñez is a 43 year old male who presents to clinic today for the following health issues:      Back Pain       Duration: ongoing        Specific cause: none    Description:   Location of pain: low back bilateral  Character of pain: sharp  Pain radiation:radiates into the right leg  New numbness or weakness in legs, not attributed to pain:  no     Intensity: Currently 6/10, severe    History:   Pain interferes with job: YES  History of back problems: Yes  Any previous MRI or X-rays: None  Sees a specialist for back pain:  No  Therapies tried without relief: none    Alleviating factors:   Improved by: none      Precipitating factors:  Worsened by: Standing and Walking    Functional and Psychosocial Screen (Nash STarT Back):      Not performed today          Accompanying Signs & Symptoms:  Risk of Fracture:  None  Risk of Cauda Equina:  None  Risk of Infection:  None  Risk of Cancer:  None  Risk of Ankylosing Spondylitis:  Onset at age <35, male, AND morning back stiffness. no           43 year old male presents with the above concern. Pain right low back, buttock, lateral down leg into calf. No loss of bowel of bowel or bladder. No numbness. Hx left sciatic nerve issues but this time it's right. Back sore for a couple of weeks. [place] but took a cart and they don't have shocks and more sore after. Then he was doing physical therapy [facility] stuff and was lying on his back and his 2 year old sat on him. Then took nap on couch 4 days ago and woke up with lots more pain. No numbness, tingling, weakness. Pain goes from right low back through buttock, lateral down leg. Works as a . Hurts to walk more than 25 feet. Didn't think he could walk from car to work today so called in sick.     Problem list and histories reviewed & adjusted, as indicated.  Additional history: as documented    Patient Active Problem List   Diagnosis     CARDIOVASCULAR  SCREENING; LDL GOAL LESS THAN 160     Migraine headache     HTN, goal below 140/90     Essential hypertension with goal blood pressure less than 140/90     Type 2 diabetes mellitus without complication (H)     DM type 2, goal HbA1c < 7% (H)     Claustrophobia     Hyperlipidemia LDL goal <100     Shoulder tendinitis, right     Cervical strain     Past Surgical History:   Procedure Laterality Date     NO HISTORY OF SURGERY         Social History   Substance Use Topics     Smoking status: Former Smoker     Types: Cigarettes     Smokeless tobacco: Never Used     Alcohol use Yes      Comment: 3 to 4 drinks - 4 to 7 days a week     Family History   Problem Relation Age of Onset     Diabetes Mother      HEART DISEASE Father      Musculoskeletal Disorder Father          Current Outpatient Prescriptions   Medication Sig Dispense Refill     amLODIPine (NORVASC) 5 MG tablet Take 1 tablet (5 mg) by mouth daily 90 tablet 0     atorvastatin (LIPITOR) 10 MG tablet Take 1 tablet (10 mg) by mouth At Bedtime 90 tablet 0     blood glucose monitoring (ACCU-CHEK SMARTVIEW) test strip Use to test blood sugar 2 times daily or as directed. 2 Box 1     blood glucose monitoring (ONE TOUCH ULTRA 2) meter device kit Use to test blood sugars 2 times daily or as directed. 1 kit 0     cyclobenzaprine (FLEXERIL) 5 MG tablet Take 1-2 tablets (5-10 mg) by mouth 3 times daily as needed for muscle spasms 30 tablet 0     diclofenac (VOLTAREN) 75 MG EC tablet Take 1 tablet (75 mg) by mouth 2 times daily as needed for moderate pain 30 tablet 0     HYDROcodone-acetaminophen (NORCO) 5-325 MG per tablet Take 1 tablet by mouth every 6 hours as needed for severe pain 10 tablet 0     lisinopril (PRINIVIL/ZESTRIL) 2.5 MG tablet TAKE 1 TABLET BY MOUTH DAILY 90 tablet 1     Magnesium-Potassium 250-100 MG TABS        metFORMIN (GLUCOPHAGE) 500 MG tablet TAKE 1 TABLET DAILY WITH DINNER (DOSE CHANGE) 90 tablet 0     multivitamin, therapeutic with minerals  "(MULTI-VITAMIN) TABS Take 1 tablet by mouth daily       OMEPRAZOLE PO Take 20 mg by mouth daily       order for DME Equipment being ordered: Blood pressure monitor.  Check blood pressure every morning.  Goal blood pressure: systolic less than 125; diastolic blood pressure less than 75. 1 each 0     polyethylene glycol (MIRALAX/GLYCOLAX) powder MIX 17 GRAM (1 CAPFUL) IN 4 TO 8 OUNCES OF LIQUID AND DRINK BY MOUTH DAILY 527 g 1     triamterene-hydrochlorothiazide (DYAZIDE) 37.5-25 MG per capsule Take 1 capsule by mouth daily 90 capsule 0     No Known Allergies    Reviewed and updated as needed this visit by clinical staff  Tobacco  Allergies  Meds  Problems  Med Hx  Surg Hx  Fam Hx  Soc Hx        Reviewed and updated as needed this visit by Provider  Allergies  Meds  Problems         ROS:  Constitutional, HEENT, cardiovascular, pulmonary, gi and gu systems are negative, except as otherwise noted.    OBJECTIVE:     /78  Pulse 106  Temp 98.4  F (36.9  C) (Oral)  Resp 16  Ht 5' 4.75\" (1.645 m)  Wt 166 lb (75.3 kg)  SpO2 96%  BMI 27.84 kg/m2  Body mass index is 27.84 kg/(m^2).  GENERAL: healthy, alert and no distress  NECK: no adenopathy, no asymmetry, masses, or scars and thyroid normal to palpation  RESP: lungs clear to auscultation - no rales, rhonchi or wheezes  CV: regular rate and rhythm, normal S1 S2, no S3 or S4, no murmur, click or rub, no peripheral edema and peripheral pulses strong  MS: no gross musculoskeletal defects noted, no edema  SKIN: no suspicious lesions or rashes  NEURO: Normal strength and tone, mentation intact and speech normal  Comprehensive back pain exam:  Tenderness of right low back, Pain limits the following motions: bending forward and twisting, Lower extremity strength functional and equal on both sides, Lower extremity reflexes within normal limits bilaterally, Lower extremity sensation normal and equal on both sides and Straight leg positive on  right, indicating " possible ipsilateral radiculopathy  PSYCH: mentation appears normal, affect normal/bright    Diagnostic Test Results:  none     ASSESSMENT/PLAN:       1. Acute right-sided low back pain with right-sided sciatica  Neuro intact. No red flags.  Ice or heat 15 minutes 4-6 times daily  Gentle stretching  Movement is key for early relief of back pain  Start diclofenac 1 tab twice daily as needed for pain - don't take with ibuprofen or naproxen  Start cyclobenzaprine 1-2 tabs up to 3 times daily as needed for back pain/spasms. No driving, operating machinery or drinking alcohol while taking  If worsening or not improving in 1 week, follow up with primary care provider, sooner if needed.  If you develop loss of bowel or bladder, numbness in the groin or thighs, or foot drop, go to emergency department.  - cyclobenzaprine (FLEXERIL) 5 MG tablet; Take 1-2 tablets (5-10 mg) by mouth 3 times daily as needed for muscle spasms  Dispense: 30 tablet; Refill: 0  - diclofenac (VOLTAREN) 75 MG EC tablet; Take 1 tablet (75 mg) by mouth 2 times daily as needed for moderate pain  Dispense: 30 tablet; Refill: 0  - [facility] PT, HAND, AND CHIROPRACTIC REFERRAL    See Patient Instructions    The benefits, risks and potential side effects were discussed in detail. Black box warnings discussed as relevant. All patient questions were answered. The patient was instructed to follow up immediately if any adverse reactions develop.    Patient verbalizes understanding and agrees with plan of care. Patient stable for discharge.      YANELI Robertson [facility]

## 2018-09-06 ENCOUNTER — THERAPY VISIT (OUTPATIENT)
Dept: PHYSICAL THERAPY | Facility: CLINIC | Age: 43
End: 2018-09-06
Attending: NURSE PRACTITIONER
Payer: COMMERCIAL

## 2018-09-06 DIAGNOSIS — M54.41 RIGHT-SIDED LOW BACK PAIN WITH RIGHT-SIDED SCIATICA: Primary | ICD-10-CM

## 2018-09-06 PROCEDURE — 97161 PT EVAL LOW COMPLEX 20 MIN: CPT | Mod: GP | Performed by: PHYSICAL THERAPIST

## 2018-09-06 PROCEDURE — 97110 THERAPEUTIC EXERCISES: CPT | Mod: GP | Performed by: PHYSICAL THERAPIST

## 2018-09-06 NOTE — MR AVS SNAPSHOT
After Visit Summary   9/6/2018    Fred Núñez    MRN: [id number]           Patient Information     Date Of Birth          1975        Visit Information        Provider Department      9/6/2018 4:10 PM Minnie Vila, PT [facility]        Today's Diagnoses     Right-sided low back pain with right-sided sciatica    -  1       Follow-ups after your visit        Your next 10 appointments already scheduled     Sep 10, 2018  4:10 PM CDT   ANAYELI Extremity with Minnie Vila, PT   [facility] ([facility]  )    [address]  [facility] [address]   [phone]            Sep 12, 2018  3:30 PM CDT   ANAYELI Extremity with Claudia Hu, PTA   [facility] ([facility]  )    [address]  [facility] [address]   [phone]            Sep 14, 2018  2:10 PM CDT   ANAYELI Extremity with Claudia Hu, PTA   [facility] ([facility]  )    [address]  [facility] [address]   [phone]            Sep 17, 2018  4:10 PM CDT   ANAYELI Extremity with Minnie Vila, PT   [facility] ([facility]  )    [address]  [facility] [address]   [phone]            Sep 21, 2018 12:50 PM CDT   ANAYELI Extremity with Claudia Hu, PTA   [facility] ([facility]  )    [address]  [facility] [address]   [phone]              Who to contact     If you have questions or need follow up information about today's clinic visit or your schedule please contact [facility] directly at [phone].  Normal or non-critical lab and imaging results will be communicated to you by MyChart, letter or phone within 4 business days after the clinic has received the results. If you do not hear from us within 7 days, please contact  the clinic through [employer] or phone. If you have a critical or abnormal lab result, we will notify you by phone as soon as possible.  Submit refill requests through [employer] or call your pharmacy and they will forward the refill request to us. Please allow 3 business days for your refill to be completed.          Additional Information About Your Visit        [employer] Information     [employer] gives you secure access to your electronic health record. If you see a primary care provider, you can also send messages to your care team and make appointments. If you have questions, please call your primary care clinic.  If you do not have a primary care provider, please call [phone] and they will assist you.        Care EveryWhere ID     This is your Care EveryWhere ID. This could be used by other organizations to access your [place] medical records  [id number]         Blood Pressure from Last 3 Encounters:   09/04/18 135/78   07/19/18 122/78   05/29/18 122/78    Weight from Last 3 Encounters:   09/04/18 75.3 kg (166 lb)   07/19/18 76.2 kg (168 lb)   05/29/18 75.3 kg (166 lb)              We Performed the Following     HC PT EVAL, LOW COMPLEXITY     ANAYELI INITIAL EVAL REPORT     THERAPEUTIC EXERCISES        Primary Care Provider Office Phone # Fax #    Dolores Andrez Baldwin [phone] [address] [facility] [address]        Equal Access to Services     AMRIT SLAUGHTER : Hadii aad ku hadasho Soomaali, waaxda luqadaha, qaybta kaalmada adeegyada, waqar de la torre . So [facility] [phone].    ATENCIÓN: Si habla español, tiene a crenshaw disposición servicios gratuitos de asistencia lingüística. Llame al [phone].    We comply with applicable federal civil rights laws and [place] laws. We do not discriminate on the basis of race, color, national origin, age, disability, sex, sexual orientation, or gender identity.            Thank you!     Thank you for choosing [facility]  for your care. Our goal is always to provide you with excellent care. Hearing back from our patients is one way we can continue to improve our services. Please take a few minutes to complete the written survey that you may receive in the mail after your visit with us. Thank you!             Your Updated Medication List - Protect others around you: Learn how to safely use, store and throw away your medicines at www.disposemymeds.org.          This list is accurate as of 9/6/18  6:08 PM.  Always use your most recent med list.                   Brand Name Dispense Instructions for use Diagnosis    amLODIPine 5 MG tablet    NORVASC    90 tablet    Take 1 tablet (5 mg) by mouth daily    Essential hypertension with goal blood pressure less than 140/90       atorvastatin 10 MG tablet    LIPITOR    90 tablet    Take 1 tablet (10 mg) by mouth At Bedtime    Hyperlipidemia LDL goal <100       blood glucose monitoring meter device kit     1 kit    Use to test blood sugars 2 times daily or as directed.    DM type 2, goal HbA1c < 7% (H), Hyperlipidemia LDL goal <100       blood glucose monitoring test strip    ACCU-CHEK SMARTVIEW    2 Box    Use to test blood sugar 2 times daily or as directed.    DM type 2, goal HbA1c < 7% (H), Hyperlipidemia LDL goal <100       cyclobenzaprine 5 MG tablet    FLEXERIL    30 tablet    Take 1-2 tablets (5-10 mg) by mouth 3 times daily as needed for muscle spasms    Acute right-sided low back pain with right-sided sciatica       diclofenac 75 MG EC tablet    VOLTAREN    30 tablet    Take 1 tablet (75 mg) by mouth 2 times daily as needed for moderate pain    Acute right-sided low back pain with right-sided sciatica       HYDROcodone-acetaminophen 5-325 MG per tablet    NORCO    10 tablet    Take 1 tablet by mouth every 6 hours as needed for severe pain    Biceps tendonitis, right, Tendinitis of right triceps       lisinopril 2.5 MG tablet    PRINIVIL/Zestril     90 tablet    TAKE 1 TABLET BY MOUTH DAILY    Type 2 diabetes mellitus without complication, without long-term current use of insulin (H), Essential hypertension with goal blood pressure less than 140/90       Magnesium-Potassium 250-100 MG Tabs       Migraine headache       metFORMIN 500 MG tablet    GLUCOPHAGE    90 tablet    TAKE 1 TABLET DAILY WITH DINNER (DOSE CHANGE)    Type 2 diabetes mellitus without complication, without long-term current use of insulin (H)       Multi-vitamin Tabs tablet      Take 1 tablet by mouth daily        OMEPRAZOLE PO      Take 20 mg by mouth daily        order for DME     1 each    Equipment being ordered: Blood pressure monitor. Check blood pressure every morning. Goal blood pressure: systolic less than 125; diastolic blood pressure less than 75.    HTN (hypertension)       polyethylene glycol powder    MIRALAX/GLYCOLAX    527 g    MIX 17 GRAM (1 CAPFUL) IN 4 TO 8 OUNCES OF LIQUID AND DRINK BY MOUTH DAILY    Constipation, unspecified constipation type       triamterene-hydrochlorothiazide 37.5-25 MG per capsule    DYAZIDE    90 capsule    Take 1 capsule by mouth daily    Essential hypertension with goal blood pressure less than 140/90

## 2018-09-06 NOTE — PROGRESS NOTES
[facility] Initial Evaluation -- Lumbar    Date: September 6, 2018  Fred Núñez is a 43 year old male with a lumbar condition.   Referral: 9/4/2018  Work mechanical stresses:  Sitting, computer work  Employment status:    Leisure mechanical stresses: Golf  Functional disability score (YORDY/STarT Back): YORDY 52/STarT Back 6/med   VAS score (0-10): 3/10 currently  Patient goals/expectations:  Be able to stand up and walk straight, be able to carry 2 year old daughter    HISTORY:    Present symptoms: Pain and tension down lateral thigh and posterior calf ending just below the calf  Pain quality (sharp/shooting/stabbing/aching/burning/cramping):  Electrical shooting   Paresthesia (yes/no):  no    Present since (onset date): 8/31/2018.     Symptoms (improving/unchanging/worsening):  Pain gradually increasing since mid/end of August with exacerbation beginning 8/31/2018.     Symptoms commenced as a result of: Initially noticed LBP after using a golf cart rather than walking during 2 golf games.  Exacerbation occurred beginning 8/31 and worsened 9/1 after napping on the couch.  Condition occurred in the following environment:   home     Symptoms at onset (back/thigh/leg): R thigh and lower leg  Constant symptoms (back/thigh/leg): R thigh and lower leg  Intermittent symptoms (back/thigh/leg):     Symptoms are made worse with the following: Always Standing, Always Walking, Always Lying and Time of day - Always as the day progresses   Symptoms are made better with the following: Always Sitting and Time of day - Always AM    Disturbed sleep (yes/no):  Yes-less than 4 hours of sleep    Sleeping postures (prone/sup/side R/L):  Trying prone    Previous episodes (0/1-5/6-10/11+): 2-3 Year of first episode: 2014-during adult life, experienced one episode of LBP at age 14    Previous history: 2-3 episodes of previous LBP  Previous treatments: PT-responded positively       Specific  Questions:  Cough/Sneeze/Strain (pos/neg): pos  Bowel/Bladder (normal/abnormal): normal  Gait (normal/abnormal): abnormal-unable to extend fully  Medications (nil/NSAIDS/analg/steroids/anticoag/other):  NSAIDS and Muscle relaxants  Medical allergies:  no  General health (excellent/good/fair/poor):  good  Pertinent medical history:  Diabetes and High blood pressure  Imaging (None/Xray/MRI/Other):    Recent or major surgery (yes/no):  no  Night pain (yes/no): no  Accidents (yes/no): no  Unexplained weight loss (yes/no): no  Barriers at home: no  Other red flags: no    EXAMINATION    Posture:   Sitting (good/fair/poor): poor  Standing (good/fair/poor):poor  Lordosis (red/acc/normal):red  Correction of posture (better/worse/no effect): worse    Lateral Shift (right/left/nil): kyphotic  Relevant (yes/no):  yes  Other Observations:     Neurological:    Motor deficit:  5/5 DF B, 3+/5 knee flex on R vs 5/5 on L, knee ext 4+/5 R, 5/5 L.    Reflexes:    Sensory deficit:  none    Dural signs:  (+)slump R    Movement Loss:   Abdi Mod Min Nil Pain   Flexion   x  Inc R leg tension   Extension x    Inc R leg pain   Side Gliding R        Side Gliding L          Test Movements:   During: produces, abolishes, increases, decreases, no effect, centralizing, peripheralizing   After: better, worse, no better, no worse, no effect, centralized, peripheralized    Pretest symptoms standing: >3/10 R leg pain   Symptoms During Symptoms After ROM increased ROM decreased No Effect   FIS No Effect    No Effect         Rep FIS          EIS Increases  Peripheralising    No Worse         Rep EIS          Pretest symptoms lying: 3/10 R leg pain    Symptoms During Symptoms After ROM increased ROM decreased No Effect   STEVEN          Rep STEVEN          EIL          Rep EIL          If required, pretest symptoms:    Symptoms During Symptoms After ROM increased ROM decreased No Effect   SGIS - R          Rep SGIS - R          SGIS - L          Rep SGIS - L             Static Tests:  Sitting slouched:     Sitting erect:  peripheralizing  Standing slouched dec pain  Standing erect:  peipheralizing  Lying prone in extension:  Prone over pillow-centralizing to thigh.  Prone lying-centralizing to buttock  Prone in extension with pillow under chest-dec sx.   Gait improved slightly following session.  Long sitting:      Other Tests:     Provisional Classification:  Derangement     Principle of Management:  Education:  centralizations    Equipment provided:  Discussed using towel roll instead of lumbar roll if lumbar roll too thick  Mechanical therapy (Y/N):  Y   Extension principle:  Progression of prone lying to prone lying in extension    ateral Principle:    Flexion principle:       Other:      ASSESSMENT/PLAN:    Patient is a 43 year old male with lumbar complaints.    Patient has the following significant findings with corresponding treatment plan.                Diagnosis 1:  LBP  Pain -  manual therapy, directional preference exercise and home program  Decreased ROM/flexibility - manual therapy, therapeutic exercise and home program  Decreased strength - therapeutic exercise, therapeutic activities and home program  Decreased function - therapeutic activities and home program  Impaired posture - neuro re-education, therapeutic activities and home program    Therapy Evaluation Codes:   1) History comprised of:   Personal factors that impact the plan of care:      None.    Comorbidity factors that impact the plan of care are:      Diabetes.     Medications impacting care: None.  2) Examination of Body Systems comprised of:   Body structures and functions that impact the plan of care:      Lumbar spine.   Activity limitations that impact the plan of care are:      Standing and Walking.  3) Clinical presentation characteristics are:   Stable/Uncomplicated.  4) Decision-Making    Low complexity using standardized patient assessment instrument and/or measureable assessment of  functional outcome.  Cumulative Therapy Evaluation is: Low complexity.    Previous and current functional limitations:  (See Goal Flow Sheet for this information)    Short term and Long term goals: (See Goal Flow Sheet for this information)     Communication ability:  Patient appears to be able to clearly communicate and understand verbal and written communication and follow directions correctly.  Treatment Explanation - The following has been discussed with the patient:   RX ordered/plan of care  Anticipated outcomes  Possible risks and side effects  This patient would benefit from PT intervention to resume normal activities.   Rehab potential is good.    Frequency:  3 X week, once daily  Duration:  for 1 weeks tapering to 2 X a week over 3 weeks  Discharge Plan:  Achieve all LTG.  Independent in home treatment program.  Reach maximal therapeutic benefit.    Please refer to the daily flowsheet for treatment today, total treatment time and time spent performing 1:1 timed codes.

## 2018-09-07 ENCOUNTER — OFFICE VISIT (OUTPATIENT)
Dept: FAMILY MEDICINE | Facility: CLINIC | Age: 43
End: 2018-09-07
Payer: COMMERCIAL

## 2018-09-07 VITALS
RESPIRATION RATE: 18 BRPM | BODY MASS INDEX: 27.38 KG/M2 | TEMPERATURE: 98.5 F | DIASTOLIC BLOOD PRESSURE: 86 MMHG | WEIGHT: 164.3 LBS | OXYGEN SATURATION: 97 % | SYSTOLIC BLOOD PRESSURE: 124 MMHG | HEART RATE: 125 BPM | HEIGHT: 65 IN

## 2018-09-07 DIAGNOSIS — K59.00 CONSTIPATION, UNSPECIFIED CONSTIPATION TYPE: ICD-10-CM

## 2018-09-07 DIAGNOSIS — M54.41 ACUTE RIGHT-SIDED LOW BACK PAIN WITH RIGHT-SIDED SCIATICA: Primary | ICD-10-CM

## 2018-09-07 PROCEDURE — 99213 OFFICE O/P EST LOW 20 MIN: CPT | Performed by: NURSE PRACTITIONER

## 2018-09-07 RX ORDER — CYCLOBENZAPRINE HCL 5 MG
5-10 TABLET ORAL 3 TIMES DAILY PRN
Qty: 30 TABLET | Refills: 0 | Status: SHIPPED | OUTPATIENT
Start: 2018-09-07 | End: 2019-05-15

## 2018-09-07 ASSESSMENT — PAIN SCALES - GENERAL: PAINLEVEL: EXTREME PAIN (8)

## 2018-09-07 NOTE — PROGRESS NOTES
SUBJECTIVE:   Fred Núñez is a 43 year old male who presents to clinic today for the following health issues:      Concern - RT side sciatica   Onset: 1 week    Description:   A few weeks ago had some back pain and last Friday took a nap on the couch and RT leg was sore. Lost mobility over the weekend and it has been this way the rest of the week    Intensity: mild    Progression of Symptoms:  same    Accompanying Signs & Symptoms:  no    Previous history of similar problem:   yes    Precipitating factors:   Worsened by: unsure    Alleviating factors:  Improved by: muscle relaxer    Therapies Tried and outcome: muscle relaxer did help    Works at a computer. Can only walk about 50 ft before he starts to 'hunch' over. As the day goes on he seems to lean and hunch over more and more.  He will get a shooting pain right leg down his calf. Went to PT yesterday. Last 2-3 nights has taken hours to get back to sleep. When he is more relaxed he can get back to sleep. The flexeril did help him relax enough. He did try a vicodin, it didn't help the pain at all, just made him sleepy.       Problem list and histories reviewed & adjusted, as indicated.  Additional history: as documented    Patient Active Problem List   Diagnosis     CARDIOVASCULAR SCREENING; LDL GOAL LESS THAN 160     Migraine headache     HTN, goal below 140/90     Essential hypertension with goal blood pressure less than 140/90     Type 2 diabetes mellitus without complication (H)     DM type 2, goal HbA1c < 7% (H)     Claustrophobia     Hyperlipidemia LDL goal <100     Shoulder tendinitis, right     Cervical strain     Right-sided low back pain with right-sided sciatica     Acute right-sided low back pain with right-sided sciatica     Past Surgical History:   Procedure Laterality Date     NO HISTORY OF SURGERY         Social History   Substance Use Topics     Smoking status: Former Smoker     Types: Cigarettes     Smokeless tobacco: Never Used      Alcohol use Yes      Comment: 3 to 4 drinks - 4 to 7 days a week     Family History   Problem Relation Age of Onset     Diabetes Mother      HEART DISEASE Father      Musculoskeletal Disorder Father          Current Outpatient Prescriptions   Medication Sig Dispense Refill     amLODIPine (NORVASC) 5 MG tablet Take 1 tablet (5 mg) by mouth daily 90 tablet 0     atorvastatin (LIPITOR) 10 MG tablet Take 1 tablet (10 mg) by mouth At Bedtime 90 tablet 0     blood glucose monitoring (ACCU-CHEK SMARTVIEW) test strip Use to test blood sugar 2 times daily or as directed. 2 Box 1     blood glucose monitoring (ONE TOUCH ULTRA 2) meter device kit Use to test blood sugars 2 times daily or as directed. 1 kit 0     cyclobenzaprine (FLEXERIL) 5 MG tablet Take 1-2 tablets (5-10 mg) by mouth 3 times daily as needed for muscle spasms 30 tablet 0     diclofenac (VOLTAREN) 75 MG EC tablet Take 1 tablet (75 mg) by mouth 2 times daily as needed for moderate pain 30 tablet 0     lisinopril (PRINIVIL/ZESTRIL) 2.5 MG tablet TAKE 1 TABLET BY MOUTH DAILY 90 tablet 1     Magnesium-Potassium 250-100 MG TABS        metFORMIN (GLUCOPHAGE) 500 MG tablet TAKE 1 TABLET DAILY WITH DINNER (DOSE CHANGE) 90 tablet 0     methylPREDNISolone (MEDROL DOSEPAK) 4 MG tablet Follow package instructions 21 tablet 0     multivitamin, therapeutic with minerals (MULTI-VITAMIN) TABS Take 1 tablet by mouth daily       OMEPRAZOLE PO Take 20 mg by mouth daily       order for DME Equipment being ordered: right knee wheeled-cart 1 Units 0     order for DME Equipment being ordered: Blood pressure monitor.  Check blood pressure every morning.  Goal blood pressure: systolic less than 125; diastolic blood pressure less than 75. 1 each 0     polyethylene glycol (MIRALAX/GLYCOLAX) powder MIX 17 GRAM (1 CAPFUL) IN 4 TO 8 OUNCES OF LIQUID AND DRINK BY MOUTH DAILY 527 g 1     triamterene-hydrochlorothiazide (DYAZIDE) 37.5-25 MG per capsule Take 1 capsule by mouth daily 90 capsule  "0     No Known Allergies    Reviewed and updated as needed this visit by clinical staff  Tobacco  Allergies  Meds  Problems  Med Hx  Surg Hx  Fam Hx  Soc Hx        Reviewed and updated as needed this visit by Provider  Allergies  Meds  Problems         ROS:  Constitutional, , cardiovascular, pulmonary,MS as above, systems are negative, except as otherwise noted.    OBJECTIVE:     /86 (BP Location: Right arm, Patient Position: Sitting, Cuff Size: Adult Regular)  Pulse 125  Temp 98.5  F (36.9  C) (Oral)  Resp 18  Ht 1.645 m (5' 4.75\")  Wt 74.5 kg (164 lb 4.8 oz)  SpO2 97%  BMI 27.55 kg/m2  Body mass index is 27.55 kg/(m^2).  GENERAL: healthy, alert and no distress  RESP: lungs clear to auscultation - no rales, rhonchi or wheezes  CV: regular rate and rhythm, normal S1 S2, no S3 or S4, no murmur, click or rub  MS: slightly altered gait, hunched over from pain. No pain with palpation, more with movement from lower back    Diagnostic Test Results:  No results found for this or any previous visit (from the past 24 hour(s)).    ASSESSMENT/PLAN:         1. Acute right-sided low back pain with right-sided sciatica  Use steroid burst from [facility]-at pharmacy already. Will give refill of flexeril also. Continue PT. norco didn't help him, will not give refill of that.   - cyclobenzaprine (FLEXERIL) 5 MG tablet; Take 1-2 tablets (5-10 mg) by mouth 3 times daily as needed for muscle spasms  Dispense: 30 tablet; Refill: 0  - order for DME; Equipment being ordered: right knee wheeled-cart  Dispense: 1 Units; Refill: 0    FUTURE APPOINTMENTS:       - Follow-up visit in 1 week if not improving    YANELI Henao, NP-C  [facility]"

## 2018-09-07 NOTE — MR AVS SNAPSHOT
After Visit Summary   9/7/2018    Fred Núñez    MRN: [id number]           Patient Information     Date Of Birth          1975        Visit Information        Provider Department      9/7/2018 12:00 PM Leanne De Los Santos NP [facility]        Today's Diagnoses     Acute right-sided low back pain with right-sided sciatica    -  1      Care Instructions         - Follow-up visit in 1 week if not improving            Follow-ups after your visit        Follow-up notes from your care team     Return in about 1 week (around 9/14/2018), or if symptoms worsen or fail to improve.      Your next 10 appointments already scheduled     Sep 10, 2018  4:10 PM CDT   ANAYELI Spine with Minnie Vila, PT   [facility] ([facility]  )    [address]  [facility] [place] [address] [phone]            Sep 12, 2018  3:30 PM CDT   ANAYELI Spine with Claudia Hu, PTA   [facility] ([facility]  )    [address]  [facility] [place] [address] [phone]            Sep 14, 2018  2:10 PM CDT   ANAYELI Spine with Claudia Hu, PTA   [facility] ([facility]  )    [address]  [facility] [place] [address] [phone]            Sep 17, 2018  4:10 PM CDT   ANAYELI Spine with Minnie S Julito, PT   [facility] ([facility]  )    [address]  [facility] [place] [address] [phone]            Sep 21, 2018 12:50 PM CDT   ANAYELI Spine with Claudia Hu, PTA   [facility] ([facility]  )    [address]  [facility] [place] [address] [phone]              Who to contact     If you have questions or need follow up information about today's clinic visit or your schedule please contact [facility] directly at [phone].  Normal or non-critical lab and imaging results will be communicated to you by  "MyChart, letter or phone within 4 business days after the clinic has received the results. If you do not hear from us within 7 days, please contact the clinic through eyeOSt or phone. If you have a critical or abnormal lab result, we will notify you by phone as soon as possible.  Submit refill requests through [employer] or call your pharmacy and they will forward the refill request to us. Please allow 3 business days for your refill to be completed.          Additional Information About Your Visit        Jamglue[employer]Vox Information     [employer] gives you secure access to your electronic health record. If you see a primary care provider, you can also send messages to your care team and make appointments. If you have questions, please call your primary care clinic.  If you do not have a primary care provider, please call [phone] and they will assist you.        Care EveryWhere ID     This is your Care EveryWhere ID. This could be used by other organizations to access your [facility] medical records  [id number]        Your Vitals Were     Pulse Temperature Respirations Height Pulse Oximetry BMI (Body Mass Index)    125 98.5  F (36.9  C) (Oral) 18 1.645 m (5' 4.75\") 97% 27.55 kg/m2       Blood Pressure from Last 3 Encounters:   09/07/18 124/86   09/04/18 135/78   07/19/18 122/78    Weight from Last 3 Encounters:   09/07/18 74.5 kg (164 lb 4.8 oz)   09/04/18 75.3 kg (166 lb)   07/19/18 76.2 kg (168 lb)              Today, you had the following     No orders found for display         Today's Medication Changes          These changes are accurate as of 9/7/18  1:58 PM.  If you have any questions, ask your nurse or doctor.               Start taking these medicines.        Dose/Directions    order for DME   Used for:  Acute right-sided low back pain with right-sided sciatica   Started by:  Leanne De Los Santos NP        Equipment being ordered: right knee wheeled-cart   Quantity:  1 Units   Refills:  0         Stop taking these " medicines if you haven't already. Please contact your care team if you have questions.     HYDROcodone-acetaminophen 5-325 MG per tablet   Commonly known as:  NORCO   Stopped by:  Leanne De Los Santos NP                Where to get your medicines      These medications were sent to [facility]/pharmacy #[id number] - [place], [place] - [address] [place] [address] [facility]  [address] [facility] [address]     Phone:  [phone]     cyclobenzaprine 5 MG tablet         Some of these will need a paper prescription and others can be bought over the counter.  Ask your nurse if you have questions.     Bring a paper prescription for each of these medications     order for DME                Primary Care Provider Office Phone # Fax #    Dolores Baldwin [phone] [address] [place] [address]        Equal Access to Services     [facility] : Hadii mike henao hadasho Soomaali, waaxda luqadaha, qaybta kaalmada adeegyada, waqar bertrand haykaterin de la torre . So [facility] [phone].    ATENCIÓN: Si habla español, tiene a crenshaw disposición servicios gratuitos de asistencia lingüística. Llame al [phone].    We comply with applicable federal civil rights laws and [place] laws. We do not discriminate on the basis of race, color, national origin, age, disability, sex, sexual orientation, or gender identity.            Thank you!     Thank you for choosing [facility]  for your care. Our goal is always to provide you with excellent care. Hearing back from our patients is one way we can continue to improve our services. Please take a few minutes to complete the written survey that you may receive in the mail after your visit with us. Thank you!             Your Updated Medication List - Protect others around you: Learn how to safely use, store and throw away your medicines at www.disposemymeds.org.          This list is accurate as of 9/7/18  1:58 PM.  Always  use your most recent med list.                   Brand Name Dispense Instructions for use Diagnosis    amLODIPine 5 MG tablet    NORVASC    90 tablet    Take 1 tablet (5 mg) by mouth daily    Essential hypertension with goal blood pressure less than 140/90       atorvastatin 10 MG tablet    LIPITOR    90 tablet    Take 1 tablet (10 mg) by mouth At Bedtime    Hyperlipidemia LDL goal <100       blood glucose monitoring meter device kit     1 kit    Use to test blood sugars 2 times daily or as directed.    DM type 2, goal HbA1c < 7% (H), Hyperlipidemia LDL goal <100       blood glucose monitoring test strip    ACCU-CHEK SMARTVIEW    2 Box    Use to test blood sugar 2 times daily or as directed.    DM type 2, goal HbA1c < 7% (H), Hyperlipidemia LDL goal <100       cyclobenzaprine 5 MG tablet    FLEXERIL    30 tablet    Take 1-2 tablets (5-10 mg) by mouth 3 times daily as needed for muscle spasms    Acute right-sided low back pain with right-sided sciatica       diclofenac 75 MG EC tablet    VOLTAREN    30 tablet    Take 1 tablet (75 mg) by mouth 2 times daily as needed for moderate pain    Acute right-sided low back pain with right-sided sciatica       lisinopril 2.5 MG tablet    PRINIVIL/Zestril    90 tablet    TAKE 1 TABLET BY MOUTH DAILY    Type 2 diabetes mellitus without complication, without long-term current use of insulin (H), Essential hypertension with goal blood pressure less than 140/90       Magnesium-Potassium 250-100 MG Tabs       Migraine headache       metFORMIN 500 MG tablet    GLUCOPHAGE    90 tablet    TAKE 1 TABLET DAILY WITH DINNER (DOSE CHANGE)    Type 2 diabetes mellitus without complication, without long-term current use of insulin (H)       methylPREDNISolone 4 MG tablet    MEDROL DOSEPAK    21 tablet    Follow package instructions    Acute right-sided low back pain with right-sided sciatica       Multi-vitamin Tabs tablet      Take 1 tablet by mouth daily        OMEPRAZOLE PO      Take 20 mg  by mouth daily        order for DME     1 each    Equipment being ordered: Blood pressure monitor. Check blood pressure every morning. Goal blood pressure: systolic less than 125; diastolic blood pressure less than 75.    HTN (hypertension)       order for DME     1 Units    Equipment being ordered: right knee wheeled-cart    Acute right-sided low back pain with right-sided sciatica       polyethylene glycol powder    MIRALAX/GLYCOLAX    527 g    MIX 17 GRAM (1 CAPFUL) IN 4 TO 8 OUNCES OF LIQUID AND DRINK BY MOUTH DAILY    Constipation, unspecified constipation type       triamterene-hydrochlorothiazide 37.5-25 MG per capsule    DYAZIDE    90 capsule    Take 1 capsule by mouth daily    Essential hypertension with goal blood pressure less than 140/90

## 2018-09-07 NOTE — TELEPHONE ENCOUNTER
"Requested Prescriptions   Pending Prescriptions Disp Refills     polyethylene glycol (MIRALAX/GLYCOLAX) powder [Pharmacy Med Name: POLYETHYLENE GLYCOL 3350 POWD]  Last Written Prescription Date:  7/11/18  Last Fill Quantity: 527g,  # refills: 1   Last office visit: 9/4/2018 with prescribing provider:  Lewis   Future Office Visit:     527 g 1     Sig: MIX 17 GRAM (1 CAPFUL) IN 4 TO 8 OUNCES OF LIQUID AND DRINK BY MOUTH DAILY    Laxatives Protocol Passed    9/7/2018  4:14 AM       Passed - Patient is age 6 or older       Passed - Recent (12 mo) or future (30 days) visit within the authorizing provider's specialty    Patient had office visit in the last 12 months or has a visit in the next 30 days with authorizing provider or within the authorizing provider's specialty.  See \"Patient Info\" tab in inbasket, or \"Choose Columns\" in Meds & Orders section of the refill encounter.              "

## 2018-09-10 ENCOUNTER — THERAPY VISIT (OUTPATIENT)
Dept: PHYSICAL THERAPY | Facility: CLINIC | Age: 43
End: 2018-09-10
Payer: COMMERCIAL

## 2018-09-10 DIAGNOSIS — M54.41 RIGHT-SIDED LOW BACK PAIN WITH RIGHT-SIDED SCIATICA: ICD-10-CM

## 2018-09-10 PROCEDURE — 97110 THERAPEUTIC EXERCISES: CPT | Mod: GP | Performed by: PHYSICAL THERAPIST

## 2018-09-10 PROCEDURE — 97014 ELECTRIC STIMULATION THERAPY: CPT | Mod: GP | Performed by: PHYSICAL THERAPIST

## 2018-09-11 RX ORDER — POLYETHYLENE GLYCOL 3350 17 G/17G
POWDER, FOR SOLUTION ORAL
Qty: 527 G | Refills: 1 | Status: SHIPPED | OUTPATIENT
Start: 2018-09-11 | End: 2018-11-09

## 2018-09-11 NOTE — TELEPHONE ENCOUNTER
Prescription approved per [facility] [place] [facility].  Arlen aCstaneda RN

## 2018-09-12 ENCOUNTER — THERAPY VISIT (OUTPATIENT)
Dept: PHYSICAL THERAPY | Facility: CLINIC | Age: 43
End: 2018-09-12
Payer: COMMERCIAL

## 2018-09-12 DIAGNOSIS — M54.41 RIGHT-SIDED LOW BACK PAIN WITH RIGHT-SIDED SCIATICA: ICD-10-CM

## 2018-09-12 PROCEDURE — 97110 THERAPEUTIC EXERCISES: CPT | Mod: GP | Performed by: PHYSICAL THERAPY ASSISTANT

## 2018-09-12 PROCEDURE — 97014 ELECTRIC STIMULATION THERAPY: CPT | Mod: GP | Performed by: PHYSICAL THERAPY ASSISTANT

## 2018-09-14 ENCOUNTER — THERAPY VISIT (OUTPATIENT)
Dept: PHYSICAL THERAPY | Facility: CLINIC | Age: 43
End: 2018-09-14
Payer: COMMERCIAL

## 2018-09-14 DIAGNOSIS — M54.41 RIGHT-SIDED LOW BACK PAIN WITH RIGHT-SIDED SCIATICA: ICD-10-CM

## 2018-09-14 PROCEDURE — 97014 ELECTRIC STIMULATION THERAPY: CPT | Mod: GP | Performed by: PHYSICAL THERAPY ASSISTANT

## 2018-09-14 PROCEDURE — 97110 THERAPEUTIC EXERCISES: CPT | Mod: GP | Performed by: PHYSICAL THERAPY ASSISTANT

## 2018-09-17 ENCOUNTER — THERAPY VISIT (OUTPATIENT)
Dept: PHYSICAL THERAPY | Facility: CLINIC | Age: 43
End: 2018-09-17
Payer: COMMERCIAL

## 2018-09-17 DIAGNOSIS — M54.41 RIGHT-SIDED LOW BACK PAIN WITH RIGHT-SIDED SCIATICA: ICD-10-CM

## 2018-09-17 PROCEDURE — 97014 ELECTRIC STIMULATION THERAPY: CPT | Mod: GP | Performed by: PHYSICAL THERAPIST

## 2018-09-17 PROCEDURE — 97110 THERAPEUTIC EXERCISES: CPT | Mod: GP | Performed by: PHYSICAL THERAPIST

## 2018-09-21 ENCOUNTER — THERAPY VISIT (OUTPATIENT)
Dept: PHYSICAL THERAPY | Facility: CLINIC | Age: 43
End: 2018-09-21
Payer: COMMERCIAL

## 2018-09-21 DIAGNOSIS — M54.41 RIGHT-SIDED LOW BACK PAIN WITH RIGHT-SIDED SCIATICA: ICD-10-CM

## 2018-09-21 PROCEDURE — 97014 ELECTRIC STIMULATION THERAPY: CPT | Mod: GP | Performed by: PHYSICAL THERAPY ASSISTANT

## 2018-09-21 PROCEDURE — 97110 THERAPEUTIC EXERCISES: CPT | Mod: GP | Performed by: PHYSICAL THERAPY ASSISTANT

## 2018-09-28 ENCOUNTER — THERAPY VISIT (OUTPATIENT)
Dept: PHYSICAL THERAPY | Facility: CLINIC | Age: 43
End: 2018-09-28
Payer: COMMERCIAL

## 2018-09-28 DIAGNOSIS — M54.41 RIGHT-SIDED LOW BACK PAIN WITH RIGHT-SIDED SCIATICA: ICD-10-CM

## 2018-09-28 PROCEDURE — 97110 THERAPEUTIC EXERCISES: CPT | Mod: GP | Performed by: PHYSICAL THERAPY ASSISTANT

## 2018-09-28 PROCEDURE — 97014 ELECTRIC STIMULATION THERAPY: CPT | Mod: GP | Performed by: PHYSICAL THERAPY ASSISTANT

## 2018-10-01 ENCOUNTER — TELEPHONE (OUTPATIENT)
Dept: FAMILY MEDICINE | Facility: CLINIC | Age: 43
End: 2018-10-01

## 2018-10-01 DIAGNOSIS — M54.41 ACUTE RIGHT-SIDED LOW BACK PAIN WITH RIGHT-SIDED SCIATICA: ICD-10-CM

## 2018-10-01 DIAGNOSIS — E11.9 TYPE 2 DIABETES MELLITUS WITHOUT COMPLICATION, WITHOUT LONG-TERM CURRENT USE OF INSULIN (H): ICD-10-CM

## 2018-10-01 NOTE — TELEPHONE ENCOUNTER
"Requested Prescriptions   Pending Prescriptions Disp Refills     diclofenac (VOLTAREN) 75 MG EC tablet  Last Written Prescription Date:  09/04/18  Last Fill Quantity: 30,  # refills: 0   Last [facility] Visit with [facility] provider:  09/07/18-Bunt   Future Office Visit:    30 tablet 0     Sig: Take 1 tablet (75 mg) by mouth 2 times daily as needed for moderate pain    NSAID Medications Failed    10/1/2018 10:30 AM       Failed - Normal ALT on file in past 12 months    Recent Labs   Lab Test  12/08/16   0745   ALT  75*            Failed - Normal AST on file in past 12 months    Recent Labs   Lab Test  12/08/16   0745   AST  32            Failed - Normal CBC on file in past 12 months    Recent Labs   Lab Test  01/09/15   1510   WBC  13.5*   RBC  4.77   HGB  15.3   HCT  43.0   PLT  199       For [facility] ONLY: [id number] = WBC, [id number] = RBC         Passed - Blood pressure under 140/90 in past 12 months    BP Readings from Last 3 Encounters:   09/07/18 124/86   09/04/18 135/78   07/19/18 122/78                Passed - Recent (12 mo) or future (30 days) visit within the authorizing provider's specialty    Patient had office visit in the last 12 months or has a visit in the next 30 days with authorizing provider or within the authorizing provider's specialty.  See \"Patient Info\" tab in inbasket, or \"Choose Columns\" in Meds & Orders section of the refill encounter.           Passed - Patient is age 6-64 years       Passed - Normal serum creatinine on file in past 12 months    Recent Labs   Lab Test  11/16/17   0658   CR  0.85                 "

## 2018-10-02 NOTE — TELEPHONE ENCOUNTER
Medication is being filled for 1 time refill only due to:  Patient needs labs-A1C, microalbumin, LDL, GFR in November 2018.    TC to please call patient and schedule lab appointment. Thank you.    Betina Coleman RN, BSN

## 2018-10-02 NOTE — TELEPHONE ENCOUNTER
Routing refill request to provider for review/approval because:  Labs not current:  ALT, AST, CBC    Mariah Bolanos RN

## 2018-10-02 NOTE — TELEPHONE ENCOUNTER
"Requested Prescriptions   Pending Prescriptions Disp Refills     metFORMIN (GLUCOPHAGE) 500 MG tablet [Pharmacy Med Name: METFORMIN HCL TABS 500MG]  Last Written Prescription Date:  7/3/18  Last Fill Quantity: 90 tablet,  # refills: 0   Last office visit: 9/7/2018 with prescribing provider:  Leanne De Los Santos NP   Future Office Visit:   90 tablet 0     Sig: TAKE 1 TABLET DAILY WITH DINNER    Biguanide Agents Passed    10/1/2018  7:58 PM       Passed - Blood pressure less than 140/90 in past 6 months    BP Readings from Last 3 Encounters:   09/07/18 124/86   09/04/18 135/78   07/19/18 122/78                Passed - Patient has documented LDL within the past 12 mos.    Recent Labs   Lab Test  11/16/17   0658   LDL  65            Passed - Patient has had a Microalbumin in the past 15 mos.    Recent Labs   Lab Test  11/16/17   0703   MICROL  5   UMALCR  3.84            Passed - Patient is age 10 or older       Passed - Patient has documented A1c within the specified period of time.    If HgbA1C is 8 or greater, it needs to be on file within the past 3 months.  If less than 8, must be on file within the past 6 months.     Recent Labs   Lab Test  04/17/18   1736   A1C  5.6            Passed - Patient's CR is NOT>1.4 OR Patient's EGFR is NOT<45 within past 12 mos.    Recent Labs   Lab Test  11/16/17   0658   GFRESTIMATED  >90   GFRESTBLACK  >90       Recent Labs   Lab Test  11/16/17   0658   CR  0.85            Passed - Patient does NOT have a diagnosis of CHF.       Passed - Recent (6 mo) or future (30 days) visit within the authorizing provider's specialty    Patient had office visit in the last 6 months or has a visit in the next 30 days with authorizing provider or within the authorizing provider's specialty.  See \"Patient Info\" tab in inbasket, or \"Choose Columns\" in Meds & Orders section of the refill encounter.              "

## 2018-10-03 RX ORDER — DICLOFENAC SODIUM 75 MG/1
75 TABLET, DELAYED RELEASE ORAL 2 TIMES DAILY PRN
Qty: 30 TABLET | Refills: 0 | Status: SHIPPED | OUTPATIENT
Start: 2018-10-03 | End: 2021-01-18

## 2018-10-08 ENCOUNTER — THERAPY VISIT (OUTPATIENT)
Dept: PHYSICAL THERAPY | Facility: CLINIC | Age: 43
End: 2018-10-08
Payer: COMMERCIAL

## 2018-10-08 DIAGNOSIS — M54.41 RIGHT-SIDED LOW BACK PAIN WITH RIGHT-SIDED SCIATICA: ICD-10-CM

## 2018-10-08 PROCEDURE — 97110 THERAPEUTIC EXERCISES: CPT | Mod: GP | Performed by: PHYSICAL THERAPIST

## 2018-10-08 PROCEDURE — 97014 ELECTRIC STIMULATION THERAPY: CPT | Mod: GP | Performed by: PHYSICAL THERAPIST

## 2018-10-08 NOTE — MR AVS SNAPSHOT
After Visit Summary   10/8/2018    Fred Núñez    MRN: [id number]           Patient Information     Date Of Birth          1975        Visit Information        Provider Department      10/8/2018 3:30 PM Minnie Vila, PT [facility]        Today's Diagnoses     Right-sided low back pain with right-sided sciatica           Follow-ups after your visit        Who to contact     If you have questions or need follow up information about today's clinic visit or your schedule please contact [facility] directly at [phone].  Normal or non-critical lab and imaging results will be communicated to you by SpringCMhart, letter or phone within 4 business days after the clinic has received the results. If you do not hear from us within 7 days, please contact the clinic through Scond[employer] or phone. If you have a critical or abnormal lab result, we will notify you by phone as soon as possible.  Submit refill requests through [employer] or call your pharmacy and they will forward the refill request to us. Please allow 3 business days for your refill to be completed.          Additional Information About Your Visit        MyChart Information     [employer] gives you secure access to your electronic health record. If you see a primary care provider, you can also send messages to your care team and make appointments. If you have questions, please call your primary care clinic.  If you do not have a primary care provider, please call [phone] and they will assist you.        Care EveryWhere ID     This is your Care EveryWhere ID. This could be used by other organizations to access your [facility] medical records  [id number]         Blood Pressure from Last 3 Encounters:   09/07/18 124/86   09/04/18 135/78   07/19/18 122/78    Weight from Last 3 Encounters:   09/07/18 74.5 kg (164 lb 4.8 oz)   09/04/18 75.3 kg (166 lb)   07/19/18 76.2 kg (168  valentine)              We Performed the Following     ELECTRIC STIMULATION THERAPY     THERAPEUTIC EXERCISES        Primary Care Provider Office Phone # Fax #    Dolores Andrez Baldwin [phone] [address] [address]        Equal Access to Services     PAO SLAUGHTER : Hadii aad ku hadasho Soomaali, waaxda luqadaha, qaybta kaalmada adeegyada, waxyobany idiin hayaan adejared calderon laJaydonkaterin alba. So [facility] [place] [phone].    ATENCIÓN: Si habla español, tiene a crenshaw disposición servicios gratuitos de asistencia lingüística. Llame al [phone].    We comply with applicable federal civil rights laws and [place] laws. We do not discriminate on the basis of race, color, national origin, age, disability, sex, sexual orientation, or gender identity.            Thank you!     Thank you for choosing [facility]  for your care. Our goal is always to provide you with excellent care. Hearing back from our patients is one way we can continue to improve our services. Please take a few minutes to complete the written survey that you may receive in the mail after your visit with us. Thank you!             Your Updated Medication List - Protect others around you: Learn how to safely use, store and throw away your medicines at www.disposemymeds.org.          This list is accurate as of 10/8/18  4:15 PM.  Always use your most recent med list.                   Brand Name Dispense Instructions for use Diagnosis    amLODIPine 5 MG tablet    NORVASC    90 tablet    Take 1 tablet (5 mg) by mouth daily    Essential hypertension with goal blood pressure less than 140/90       atorvastatin 10 MG tablet    LIPITOR    90 tablet    Take 1 tablet (10 mg) by mouth At Bedtime    Hyperlipidemia LDL goal <100       blood glucose monitoring meter device kit     1 kit    Use to test blood sugars 2 times daily or as directed.    DM type 2, goal HbA1c < 7% (H), Hyperlipidemia LDL goal <100        blood glucose monitoring test strip    ACCU-CHEK SMARTVIEW    2 Box    Use to test blood sugar 2 times daily or as directed.    DM type 2, goal HbA1c < 7% (H), Hyperlipidemia LDL goal <100       cyclobenzaprine 5 MG tablet    FLEXERIL    30 tablet    Take 1-2 tablets (5-10 mg) by mouth 3 times daily as needed for muscle spasms    Acute right-sided low back pain with right-sided sciatica       diclofenac 75 MG EC tablet    VOLTAREN    30 tablet    Take 1 tablet (75 mg) by mouth 2 times daily as needed for moderate pain    Acute right-sided low back pain with right-sided sciatica       lisinopril 2.5 MG tablet    PRINIVIL/Zestril    90 tablet    TAKE 1 TABLET BY MOUTH DAILY    Type 2 diabetes mellitus without complication, without long-term current use of insulin (H), Essential hypertension with goal blood pressure less than 140/90       Magnesium-Potassium 250-100 MG Tabs       Migraine headache       metFORMIN 500 MG tablet    GLUCOPHAGE    30 tablet    TAKE 1 TABLET DAILY WITH DINNER    Type 2 diabetes mellitus without complication, without long-term current use of insulin (H)       methylPREDNISolone 4 MG tablet    MEDROL DOSEPAK    21 tablet    Follow package instructions    Acute right-sided low back pain with right-sided sciatica       Multi-vitamin Tabs tablet      Take 1 tablet by mouth daily        OMEPRAZOLE PO      Take 20 mg by mouth daily        order for DME     1 each    Equipment being ordered: Blood pressure monitor. Check blood pressure every morning. Goal blood pressure: systolic less than 125; diastolic blood pressure less than 75.    HTN (hypertension)       order for DME     1 Units    Equipment being ordered: right knee wheeled-cart    Acute right-sided low back pain with right-sided sciatica       polyethylene glycol powder    MIRALAX/GLYCOLAX    527 g    MIX 17 GRAM (1 CAPFUL) IN 4 TO 8 OUNCES OF LIQUID AND DRINK BY MOUTH DAILY    Constipation, unspecified constipation type        triamterene-hydrochlorothiazide 37.5-25 MG per capsule    DYAZIDE    90 capsule    Take 1 capsule by mouth daily    Essential hypertension with goal blood pressure less than 140/90

## 2018-10-26 ENCOUNTER — MYC MEDICAL ADVICE (OUTPATIENT)
Dept: FAMILY MEDICINE | Facility: CLINIC | Age: 43
End: 2018-10-26

## 2018-10-26 DIAGNOSIS — I10 ESSENTIAL HYPERTENSION WITH GOAL BLOOD PRESSURE LESS THAN 140/90: ICD-10-CM

## 2018-10-26 DIAGNOSIS — E78.5 HYPERLIPIDEMIA LDL GOAL <100: ICD-10-CM

## 2018-10-29 DIAGNOSIS — I10 ESSENTIAL HYPERTENSION WITH GOAL BLOOD PRESSURE LESS THAN 140/90: ICD-10-CM

## 2018-10-29 DIAGNOSIS — E11.9 TYPE 2 DIABETES MELLITUS WITHOUT COMPLICATION, WITHOUT LONG-TERM CURRENT USE OF INSULIN (H): ICD-10-CM

## 2018-10-29 RX ORDER — ATORVASTATIN CALCIUM 10 MG/1
10 TABLET, FILM COATED ORAL AT BEDTIME
Qty: 90 TABLET | Refills: 0 | Status: SHIPPED | OUTPATIENT
Start: 2018-10-29 | End: 2018-11-12

## 2018-10-29 RX ORDER — LISINOPRIL 2.5 MG/1
2.5 TABLET ORAL DAILY
Qty: 90 TABLET | Refills: 0 | Status: SHIPPED | OUTPATIENT
Start: 2018-10-29 | End: 2018-11-12

## 2018-10-29 RX ORDER — TRIAMTERENE AND HYDROCHLOROTHIAZIDE 37.5; 25 MG/1; MG/1
1 CAPSULE ORAL DAILY
Qty: 90 CAPSULE | Refills: 0 | Status: SHIPPED | OUTPATIENT
Start: 2018-10-29 | End: 2018-11-12

## 2018-10-29 RX ORDER — AMLODIPINE BESYLATE 5 MG/1
5 TABLET ORAL DAILY
Qty: 90 TABLET | Refills: 0 | Status: SHIPPED | OUTPATIENT
Start: 2018-10-29 | End: 2018-11-12

## 2018-10-29 NOTE — TELEPHONE ENCOUNTER
"Requested Prescriptions   Pending Prescriptions Disp Refills     triamterene-hydrochlorothiazide (DYAZIDE) 37.5-25 MG per capsule 90 capsule 0     Sig: Take 1 capsule by mouth daily    Diuretics (Including Combos) Protocol Passed    10/29/2018  1:07 PM       Passed - Blood pressure under 140/90 in past 12 months    BP Readings from Last 3 Encounters:   09/07/18 124/86   09/04/18 135/78   07/19/18 122/78                Passed - Recent (12 mo) or future (30 days) visit within the authorizing provider's specialty    Patient had office visit in the last 12 months or has a visit in the next 30 days with authorizing provider or within the authorizing provider's specialty.  See \"Patient Info\" tab in inbasket, or \"Choose Columns\" in Meds & Orders section of the refill encounter.             Passed - Patient is age 18 or older       Passed - Normal serum creatinine on file in past 12 months    Recent Labs   Lab Test  11/16/17   0658   CR  0.85             Passed - Normal serum potassium on file in past 12 months    Recent Labs   Lab Test  11/16/17   0658   POTASSIUM  4.1                   Passed - Normal serum sodium on file in past 12 months    Recent Labs   Lab Test  11/16/17   0658   NA  138              atorvastatin (LIPITOR) 10 MG tablet 90 tablet 0     Sig: Take 1 tablet (10 mg) by mouth At Bedtime    Statins Protocol Passed    10/29/2018  1:07 PM       Passed - LDL on file in past 12 months    Recent Labs   Lab Test  11/16/17   0658   LDL  65            Passed - No abnormal creatine kinase in past 12 months    No lab results found.            Passed - Recent (12 mo) or future (30 days) visit within the authorizing provider's specialty    Patient had office visit in the last 12 months or has a visit in the next 30 days with authorizing provider or within the authorizing provider's specialty.  See \"Patient Info\" tab in inbasket, or \"Choose Columns\" in Meds & Orders section of the refill encounter.             Passed - " "Patient is age 18 or older        amLODIPine (NORVASC) 5 MG tablet 90 tablet 0     Sig: Take 1 tablet (5 mg) by mouth daily    Calcium Channel Blockers Protocol  Passed    10/29/2018  1:07 PM       Passed - Blood pressure under 140/90 in past 12 months    BP Readings from Last 3 Encounters:   09/07/18 124/86   09/04/18 135/78   07/19/18 122/78                Passed - Recent (12 mo) or future (30 days) visit within the authorizing provider's specialty    Patient had office visit in the last 12 months or has a visit in the next 30 days with authorizing provider or within the authorizing provider's specialty.  See \"Patient Info\" tab in inbasket, or \"Choose Columns\" in Meds & Orders section of the refill encounter.             Passed - Patient is age 18 or older       Passed - Normal serum creatinine on file in past 12 months    Recent Labs   Lab Test  11/16/17   0658   CR  0.85               "

## 2018-10-29 NOTE — TELEPHONE ENCOUNTER
"Requested Prescriptions   Pending Prescriptions Disp Refills     lisinopril (PRINIVIL/ZESTRIL) 2.5 MG tablet 90 tablet 1     Sig: Take 1 tablet (2.5 mg) by mouth daily    ACE Inhibitors (Including Combos) Protocol Passed    10/29/2018  1:53 PM       Passed - Blood pressure under 140/90 in past 12 months    BP Readings from Last 3 Encounters:   09/07/18 124/86   09/04/18 135/78   07/19/18 122/78                Passed - Recent (12 mo) or future (30 days) visit within the authorizing provider's specialty    Patient had office visit in the last 12 months or has a visit in the next 30 days with authorizing provider or within the authorizing provider's specialty.  See \"Patient Info\" tab in inbasket, or \"Choose Columns\" in Meds & Orders section of the refill encounter.             Passed - Patient is age 18 or older       Passed - Normal serum creatinine on file in past 12 months    Recent Labs   Lab Test  11/16/17   0658   CR  0.85            Passed - Normal serum potassium on file in past 12 months    Recent Labs   Lab Test  11/16/17   0658   POTASSIUM  4.1             lisinopril (PRINIVIL/ZESTRIL) 2.5 MG tablet  Last Written Prescription Date:  6/11/18  Last Fill Quantity: 90,  # refills: 1   Last office visit: 9/7/2018 with prescribing provider:  Dr. Baldwin   Future Office Visit:   Next 5 appointments (look out 90 days)     Nov 08, 2018  7:00 AM CST   [facility] with [facility]    [address] [address]   [phone]            Nov 12, 2018  4:20 PM CST   MyChart Long with Dolores Baldwin MD   [facility]    [address] [address]   [phone]                   "

## 2018-10-29 NOTE — TELEPHONE ENCOUNTER
Prescription approved per [facility] Refill Protocol.      Betina Coleman RN, BSN

## 2018-10-29 NOTE — TELEPHONE ENCOUNTER
Prescription approved per [facility] [place]ill Protocol.        Betina Coleman RN, BSN

## 2018-10-29 NOTE — TELEPHONE ENCOUNTER
Routing to refill pool. [employer] message sent to patient advising to utilize Rx renewal through [employer] feature - per chart review, [facility] did not send a refill request for the three medications.     Mariah Bolanos RN

## 2018-10-31 ENCOUNTER — DOCUMENTATION ONLY (OUTPATIENT)
Dept: LAB | Facility: CLINIC | Age: 43
End: 2018-10-31

## 2018-10-31 DIAGNOSIS — E11.9 TYPE 2 DIABETES MELLITUS WITHOUT COMPLICATION, WITHOUT LONG-TERM CURRENT USE OF INSULIN (H): Primary | ICD-10-CM

## 2018-10-31 NOTE — PROGRESS NOTES
Please place or confirm lab orders for upcoming lab appointment on 11/8/18, Physician appointment on 11/12/18. TAMARA Whalen CMA.

## 2018-11-08 DIAGNOSIS — E11.9 TYPE 2 DIABETES MELLITUS WITHOUT COMPLICATION, WITHOUT LONG-TERM CURRENT USE OF INSULIN (H): ICD-10-CM

## 2018-11-08 LAB
ALBUMIN SERPL-MCNC: 4.1 G/DL (ref 3.4–5)
ALP SERPL-CCNC: 67 U/L (ref 40–150)
ALT SERPL W P-5'-P-CCNC: 51 U/L (ref 0–70)
ANION GAP SERPL CALCULATED.3IONS-SCNC: 8 MMOL/L (ref 3–14)
AST SERPL W P-5'-P-CCNC: 25 U/L (ref 0–45)
BILIRUB SERPL-MCNC: 0.3 MG/DL (ref 0.2–1.3)
BUN SERPL-MCNC: 17 MG/DL (ref 7–30)
CALCIUM SERPL-MCNC: 8.7 MG/DL (ref 8.5–10.1)
CHLORIDE SERPL-SCNC: 104 MMOL/L (ref 94–109)
CHOLEST SERPL-MCNC: 173 MG/DL
CO2 SERPL-SCNC: 27 MMOL/L (ref 20–32)
CREAT SERPL-MCNC: 0.9 MG/DL (ref 0.66–1.25)
CREAT UR-MCNC: 100 MG/DL
GFR SERPL CREATININE-BSD FRML MDRD: >90 ML/MIN/1.7M2
GLUCOSE SERPL-MCNC: 92 MG/DL (ref 70–99)
HBA1C MFR BLD: 5.8 % (ref 0–5.6)
HDLC SERPL-MCNC: 45 MG/DL
LDLC SERPL CALC-MCNC: 73 MG/DL
MICROALBUMIN UR-MCNC: <5 MG/L
MICROALBUMIN/CREAT UR: NORMAL MG/G CR (ref 0–17)
NONHDLC SERPL-MCNC: 128 MG/DL
POTASSIUM SERPL-SCNC: 4.1 MMOL/L (ref 3.4–5.3)
PROT SERPL-MCNC: 7.3 G/DL (ref 6.8–8.8)
SODIUM SERPL-SCNC: 139 MMOL/L (ref 133–144)
TRIGL SERPL-MCNC: 276 MG/DL
TSH SERPL DL<=0.005 MIU/L-ACNC: 2.83 MU/L (ref 0.4–4)

## 2018-11-08 PROCEDURE — 80053 COMPREHEN METABOLIC PANEL: CPT | Performed by: FAMILY MEDICINE

## 2018-11-08 PROCEDURE — 36415 COLL VENOUS BLD VENIPUNCTURE: CPT | Performed by: FAMILY MEDICINE

## 2018-11-08 PROCEDURE — 83036 HEMOGLOBIN GLYCOSYLATED A1C: CPT | Performed by: FAMILY MEDICINE

## 2018-11-08 PROCEDURE — 84443 ASSAY THYROID STIM HORMONE: CPT | Performed by: FAMILY MEDICINE

## 2018-11-08 PROCEDURE — 80061 LIPID PANEL: CPT | Performed by: FAMILY MEDICINE

## 2018-11-08 PROCEDURE — 82043 UR ALBUMIN QUANTITATIVE: CPT | Performed by: FAMILY MEDICINE

## 2018-11-09 DIAGNOSIS — K59.00 CONSTIPATION, UNSPECIFIED CONSTIPATION TYPE: ICD-10-CM

## 2018-11-09 NOTE — TELEPHONE ENCOUNTER
"Requested Prescriptions   Pending Prescriptions Disp Refills     polyethylene glycol (MIRALAX/GLYCOLAX) powder [Pharmacy Med Name: POLYETHYLENE GLYCOL 3350 POWD]  Last Written Prescription Date:  09/11/18  Last Fill Quantity: 527g,  # refills: 1   Last Office Visit with FMCHRISTINA, INOCENC[facility] or [facility] prescribing provider:  09/07/18Jody   Future Office Visit:    Next 5 appointments (look out 90 days)     Nov 12, 2018  4:20 PM CST   MyChart Long with Dolores Baldwin MD   [facility] ([facility])    [address] [place] [id number]   [phone]                527 g 1     Sig: MIX 17 GRAM (1 CAPFUL) IN 4 TO 8 OUNCES OF LIQUID AND DRINK BY MOUTH DAILY    Laxatives Protocol Passed    11/9/2018  1:42 AM       Passed - Patient is age 6 or older       Passed - Recent (12 mo) or future (30 days) visit within the authorizing provider's specialty    Patient had office visit in the last 12 months or has a visit in the next 30 days with authorizing provider or within the authorizing provider's specialty.  See \"Patient Info\" tab in inbasket, or \"Choose Columns\" in Meds & Orders section of the refill encounter.                "

## 2018-11-12 ENCOUNTER — OFFICE VISIT (OUTPATIENT)
Dept: FAMILY MEDICINE | Facility: CLINIC | Age: 43
End: 2018-11-12
Payer: COMMERCIAL

## 2018-11-12 VITALS
HEIGHT: 64 IN | HEART RATE: 87 BPM | WEIGHT: 171 LBS | OXYGEN SATURATION: 98 % | SYSTOLIC BLOOD PRESSURE: 130 MMHG | TEMPERATURE: 98 F | BODY MASS INDEX: 29.19 KG/M2 | DIASTOLIC BLOOD PRESSURE: 82 MMHG

## 2018-11-12 DIAGNOSIS — E78.5 HYPERLIPIDEMIA LDL GOAL <100: ICD-10-CM

## 2018-11-12 DIAGNOSIS — I10 ESSENTIAL HYPERTENSION WITH GOAL BLOOD PRESSURE LESS THAN 140/90: ICD-10-CM

## 2018-11-12 DIAGNOSIS — E11.9 TYPE 2 DIABETES MELLITUS WITHOUT COMPLICATION, WITHOUT LONG-TERM CURRENT USE OF INSULIN (H): Primary | ICD-10-CM

## 2018-11-12 DIAGNOSIS — Z23 NEED FOR PROPHYLACTIC VACCINATION AND INOCULATION AGAINST INFLUENZA: ICD-10-CM

## 2018-11-12 PROCEDURE — 90686 IIV4 VACC NO PRSV 0.5 ML IM: CPT | Performed by: FAMILY MEDICINE

## 2018-11-12 PROCEDURE — 90471 IMMUNIZATION ADMIN: CPT | Performed by: FAMILY MEDICINE

## 2018-11-12 PROCEDURE — 99214 OFFICE O/P EST MOD 30 MIN: CPT | Mod: 25 | Performed by: FAMILY MEDICINE

## 2018-11-12 RX ORDER — LISINOPRIL 2.5 MG/1
2.5 TABLET ORAL DAILY
Qty: 90 TABLET | Refills: 1 | Status: SHIPPED | OUTPATIENT
Start: 2018-11-12 | End: 2019-05-15

## 2018-11-12 RX ORDER — ATORVASTATIN CALCIUM 10 MG/1
10 TABLET, FILM COATED ORAL AT BEDTIME
Qty: 90 TABLET | Refills: 3 | Status: SHIPPED | OUTPATIENT
Start: 2018-11-12 | End: 2019-05-21

## 2018-11-12 RX ORDER — TRIAMTERENE AND HYDROCHLOROTHIAZIDE 37.5; 25 MG/1; MG/1
1 CAPSULE ORAL DAILY
Qty: 90 CAPSULE | Refills: 1 | Status: SHIPPED | OUTPATIENT
Start: 2018-11-12 | End: 2019-05-15

## 2018-11-12 RX ORDER — AMLODIPINE BESYLATE 5 MG/1
5 TABLET ORAL DAILY
Qty: 90 TABLET | Refills: 1 | Status: SHIPPED | OUTPATIENT
Start: 2018-11-12 | End: 2019-05-21

## 2018-11-12 RX ORDER — POLYETHYLENE GLYCOL 3350 17 G/17G
POWDER, FOR SOLUTION ORAL
Qty: 527 G | Refills: 1 | Status: SHIPPED | OUTPATIENT
Start: 2018-11-12 | End: 2019-01-09

## 2018-11-12 ASSESSMENT — PAIN SCALES - GENERAL: PAINLEVEL: NO PAIN (0)

## 2018-11-12 NOTE — PROGRESS NOTES
"  SUBJECTIVE:   Fred Núñez is a 43 year old male who presents to clinic today for the following health issues:    Patient presents today to discuss lab results.    SUBJECTIVE:  Here today in follow-up of diabetes, hypertension, lipids  Dealt with a significant sciatic issue a couple of months ago but that seems to be fully resolved at this point  Doing well.  Reports no interval health concerns.    Patient reports no side effects from medications, and desires no change in therapy.     Review of systems otherwise negative.  Past medical, family, and social history reviewed and updated in chart.    OBJECTIVE:  /82  Pulse 87  Temp 98  F (36.7  C) (Oral)  Ht 1.626 m (5' 4\")  Wt 77.6 kg (171 lb)  SpO2 98%  BMI 29.35 kg/m2  Alert, pleasant, upbeat, and in no apparent discomfort.  S1 and S2 normal, no murmurs, clicks, gallops or rubs. Regular rate and rhythm. Chest is clear; no wheezes or rales. No edema or JVD.  Past labs reviewed with the patient.     ASSESSMENT / PLAN:  (E11.9) Type 2 diabetes mellitus without complication, without long-term current use of insulin (H)  (primary encounter diagnosis)  Comment: Well-controlled on 1 tablet daily of metformin.  Continue to follow  Plan: metFORMIN (GLUCOPHAGE) 500 MG tablet,         lisinopril (PRINIVIL/ZESTRIL) 2.5 MG tablet            (I10) Essential hypertension with goal blood pressure less than 140/90  Comment: Good control on current regimen.  Continue same and recheck 6 months  Plan: amLODIPine (NORVASC) 5 MG tablet, lisinopril         (PRINIVIL/ZESTRIL) 2.5 MG tablet,         triamterene-hydrochlorothiazide (DYAZIDE)         37.5-25 MG per capsule            (E78.5) Hyperlipidemia LDL goal <100  Comment: Controlled.  Continue to follow-up  Plan: atorvastatin (LIPITOR) 10 MG tablet            (Z23) Need for prophylactic vaccination and inoculation against influenza  Comment:   Plan: FLU VACCINE, SPLIT VIRUS, IM (QUADRIVALENT)         [38775]- >3 YRS, " Vaccine Administration,         Initial [88865]            Follow up 6 months  S. Andrez Baldwin MD    (Chart documentation completed in part with Dragon voice-recognition software.  Even though reviewed some grammatical, spelling, and word errors may remain.)    here today in follow-up of diabetes, hypertension, lipids  Injectable Influenza Immunization Documentation    1.  Is the person to be vaccinated sick today?   No    2. Does the person to be vaccinated have an allergy to a component   of the vaccine?   No  Egg Allergy Algorithm Link    3. Has the person to be vaccinated ever had a serious reaction   to influenza vaccine in the past?   No    4. Has the person to be vaccinated ever had Guillain-Barré syndrome?   No    Form completed by Terri Pan MA on 11/12/2018 at 4:19 PM

## 2018-11-12 NOTE — MR AVS SNAPSHOT
After Visit Summary   11/12/2018    Fred Núñez    MRN: [id number]           Patient Information     Date Of Birth          1975        Visit Information        Provider Department      11/12/2018 4:20 PM Dolores Baldwin MD [facility]        Today's Diagnoses     Type 2 diabetes mellitus without complication, without long-term current use of insulin (H)    -  1    Essential hypertension with goal blood pressure less than 140/90        Hyperlipidemia LDL goal <100        Need for prophylactic vaccination and inoculation against influenza           Follow-ups after your visit        Follow-up notes from your care team     Return in about 6 months (around 5/12/2019) for Diabetes Recheck with Previsit Labs.      Who to contact     If you have questions or need follow up information about today's clinic visit or your schedule please contact [facility] directly at [phone].  Normal or non-critical lab and imaging results will be communicated to you by [employer], letter or phone within 4 business days after the clinic has received the results. If you do not hear from us within 7 days, please contact the clinic through [employer] or phone. If you have a critical or abnormal lab result, we will notify you by phone as soon as possible.  Submit refill requests through [employer] or call your pharmacy and they will forward the refill request to us. Please allow 3 business days for your refill to be completed.          Additional Information About Your Visit        MyChart Information     [employer] gives you secure access to your electronic health record. If you see a primary care provider, you can also send messages to your care team and make appointments. If you have questions, please call your primary care clinic.  If you do not have a primary care provider, please call [phone] and they will assist you.        Care EveryWhere ID     This is your Care EveryWhere  "ID. This could be used by other organizations to access your [place] medical records  [id number]        Your Vitals Were     Pulse Temperature Height Pulse Oximetry BMI (Body Mass Index)       87 98  F (36.7  C) (Oral) 1.626 m (5' 4\") 98% 29.35 kg/m2        Blood Pressure from Last 3 Encounters:   11/12/18 130/82   09/07/18 124/86   09/04/18 135/78    Weight from Last 3 Encounters:   11/12/18 77.6 kg (171 lb)   09/07/18 74.5 kg (164 lb 4.8 oz)   09/04/18 75.3 kg (166 lb)              We Performed the Following     FLU VACCINE, SPLIT VIRUS, IM (QUADRIVALENT) [54389]- >3 YRS     Vaccine Administration, Initial [07543]          Today's Medication Changes          These changes are accurate as of 11/12/18  5:07 PM.  If you have any questions, ask your nurse or doctor.               Stop taking these medicines if you haven't already. Please contact your care team if you have questions.     methylPREDNISolone 4 MG tablet   Commonly known as:  MEDROL DOSEPAK   Stopped by:  Dolores Baldwin MD                Where to get your medicines      These medications were sent to [facility] - [address] [address]     Phone:  [phone]     amLODIPine 5 MG tablet    atorvastatin 10 MG tablet    lisinopril 2.5 MG tablet    metFORMIN 500 MG tablet    triamterene-hydrochlorothiazide 37.5-25 MG per capsule                Primary Care Provider Office Phone # Fax #    Dolores Baldwin [phone] [address] [address]        Equal Access to Services     [facility] KASANDRA AH: Hadii mike Fink, waaxda luqadaha, qaybta kaalmada ankit, waqar alba. So [facility] [phone].    ATENCIÓN: Si habla español, tiene a crenshaw disposición servicios gratuitos de asistencia lingüística. Llame al [phone].    We comply with applicable federal civil rights laws and [place] " laws. We do not discriminate on the basis of race, color, national origin, age, disability, sex, sexual orientation, or gender identity.            Thank you!     Thank you for choosing [facility]  for your care. Our goal is always to provide you with excellent care. Hearing back from our patients is one way we can continue to improve our services. Please take a few minutes to complete the written survey that you may receive in the mail after your visit with us. Thank you!             Your Updated Medication List - Protect others around you: Learn how to safely use, store and throw away your medicines at www.disposemymeds.org.          This list is accurate as of 11/12/18  5:07 PM.  Always use your most recent med list.                   Brand Name Dispense Instructions for use Diagnosis    amLODIPine 5 MG tablet    NORVASC    90 tablet    Take 1 tablet (5 mg) by mouth daily    Essential hypertension with goal blood pressure less than 140/90       atorvastatin 10 MG tablet    LIPITOR    90 tablet    Take 1 tablet (10 mg) by mouth At Bedtime    Hyperlipidemia LDL goal <100       blood glucose monitoring meter device kit     1 kit    Use to test blood sugars 2 times daily or as directed.    DM type 2, goal HbA1c < 7% (H), Hyperlipidemia LDL goal <100       blood glucose monitoring test strip    ACCU-CHEK SMARTVIEW    2 Box    Use to test blood sugar 2 times daily or as directed.    DM type 2, goal HbA1c < 7% (H), Hyperlipidemia LDL goal <100       cyclobenzaprine 5 MG tablet    FLEXERIL    30 tablet    Take 1-2 tablets (5-10 mg) by mouth 3 times daily as needed for muscle spasms    Acute right-sided low back pain with right-sided sciatica       diclofenac 75 MG EC tablet    VOLTAREN    30 tablet    Take 1 tablet (75 mg) by mouth 2 times daily as needed for moderate pain    Acute right-sided low back pain with right-sided sciatica       lisinopril 2.5 MG tablet    PRINIVIL/Zestril    90 tablet    Take 1  tablet (2.5 mg) by mouth daily    Type 2 diabetes mellitus without complication, without long-term current use of insulin (H), Essential hypertension with goal blood pressure less than 140/90       Magnesium-Potassium 250-100 MG Tabs       Migraine headache       metFORMIN 500 MG tablet    GLUCOPHAGE    90 tablet    TAKE 1 TABLET DAILY WITH DINNER    Type 2 diabetes mellitus without complication, without long-term current use of insulin (H)       Multi-vitamin Tabs tablet      Take 1 tablet by mouth daily        OMEPRAZOLE PO      Take 20 mg by mouth daily        order for DME     1 each    Equipment being ordered: Blood pressure monitor. Check blood pressure every morning. Goal blood pressure: systolic less than 125; diastolic blood pressure less than 75.    HTN (hypertension)       order for DME     1 Units    Equipment being ordered: right knee wheeled-cart    Acute right-sided low back pain with right-sided sciatica       polyethylene glycol powder    MIRALAX/GLYCOLAX    527 g    MIX 17 GRAM (1 CAPFUL) IN 4 TO 8 OUNCES OF LIQUID AND DRINK BY MOUTH DAILY    Constipation, unspecified constipation type       triamterene-hydrochlorothiazide 37.5-25 MG per capsule    DYAZIDE    90 capsule    Take 1 capsule by mouth daily    Essential hypertension with goal blood pressure less than 140/90

## 2018-11-12 NOTE — TELEPHONE ENCOUNTER
Prescription approved per [facility] Refill Protocol.      Betina Coleman RN, BSN

## 2019-01-09 DIAGNOSIS — K59.00 CONSTIPATION, UNSPECIFIED CONSTIPATION TYPE: ICD-10-CM

## 2019-01-09 NOTE — TELEPHONE ENCOUNTER
"Requested Prescriptions   Pending Prescriptions Disp Refills     polyethylene glycol (MIRALAX/GLYCOLAX) powder [Pharmacy Med Name: POLYETHYLENE GLYCOL 3350 POWD] 527 g 1      Last Written Prescription Date:  11/12/18  Last Fill Quantity: 527g,  # refills: 1   Last Office Visit with [facility], [facility] or [facility] prescribing provider:  11/12/18-Denny   Future Office Visit:    Sig: MIX 17 GRAM (1 CAPFUL) IN 4 TO 8 OUNCES OF LIQUID AND DRINK BY MOUTH DAILY    Laxatives Protocol Passed - 1/9/2019  2:00 AM       Passed - Patient is age 6 or older       Passed - Recent (12 mo) or future (30 days) visit within the authorizing provider's specialty    Patient had office visit in the last 12 months or has a visit in the next 30 days with authorizing provider or within the authorizing provider's specialty.  See \"Patient Info\" tab in inbasket, or \"Choose Columns\" in Meds & Orders section of the refill encounter.             Passed - Medication is active on med list          "

## 2019-01-10 RX ORDER — POLYETHYLENE GLYCOL 3350 17 G/17G
POWDER, FOR SOLUTION ORAL
Qty: 527 G | Refills: 1 | Status: SHIPPED | OUTPATIENT
Start: 2019-01-10 | End: 2022-07-01

## 2019-01-10 NOTE — TELEPHONE ENCOUNTER
Prescription approved per [facility] Refill Protocol.      Betina Coleman RN, BSN

## 2019-02-20 PROBLEM — M54.41 RIGHT-SIDED LOW BACK PAIN WITH RIGHT-SIDED SCIATICA: Status: RESOLVED | Noted: 2018-09-06 | Resolved: 2019-02-20

## 2019-02-20 PROBLEM — S16.1XXA CERVICAL STRAIN: Status: RESOLVED | Noted: 2018-07-23 | Resolved: 2019-02-20

## 2019-02-20 PROBLEM — M77.8 SHOULDER TENDINITIS, RIGHT: Status: RESOLVED | Noted: 2018-07-23 | Resolved: 2019-02-20

## 2019-02-20 NOTE — PROGRESS NOTES
Discharge Note    Progress reporting period is from initial evaluation date (please see noted date below) to Aug 23, 2018.  Linked Episodes   Type: Episode: Status: Noted: Resolved: Last update: Updated by:   PHYSICAL THERAPY R shoulder biceps and triceps tendonitis 7/23/18 Active 7/23/2018 8/23/2018  3:22 PM Terri Oliver, PT      Comments:       Fred failed to follow up and current status is unknown.  Please see information below for last relevant information on current status.  Patient seen for 4 visits.    SUBJECTIVE  Subjective changes noted by patient:  Patient reports onset of 1-2/10 R shoulder pain 5-6 hours into work day.  Otherwise no longer experiencing pain.  Exercises are going well and are helpful.    .  Current pain level is 1/10.     Previous pain level was  9/10(9/10 at worst).   Changes in function:  Yes (See Goal flowsheet attached for changes in current functional level)  Adverse reaction to treatment or activity: None    OBJECTIVE  Changes noted in objective findings: CROM: nil loss in all directions.  End range tension R neck with L rotation.  Shoulder ROM: 175 deg flex wth ERP, 160 deg abd with ERP.     ASSESSMENT/PLAN  Diagnosis: R shoulder biceps and triceps tendonitis   Updated problem list and treatment plan:   Pain - HEP  Decreased ROM/flexibility - HEP  Decreased function - HEP  Decreased strength - HEP  STG/LTGs have been met or progress has been made towards goals:  Yes, please see goal flowsheet for most current information  Assessment of Progress: current status is unknown.    Last current status: Pt is progressing as expected   Self Management Plans:  HEP  I have re-evaluated this patient and find that the nature, scope, duration and intensity of the therapy is appropriate for the medical condition of the patient.  Fred continues to require the following intervention to meet STG and LTG's:  HEP.    Recommendations:  Discharge with current home program.  Patient to follow up  with MD as needed.    Please refer to the daily flowsheet for treatment today, total treatment time and time spent performing 1:1 timed codes.

## 2019-02-20 NOTE — PROGRESS NOTES
Discharge Note    Progress reporting period is from initial evaluation date (please see noted date below) to Oct 8, 2018.  Linked Episodes   Type: Episode: Status: Noted: Resolved: Last update: Updated by:   PHYSICAL THERAPY LBP 9/6/2018 Active 9/6/2018  10/8/2018  3:28 PM Minnie Vila, PT      Comments:       Fred failed to follow up and current status is unknown.  Please see information below for last relevant information on current status.  Patient seen for 8 visits.    SUBJECTIVE  Subjective changes noted by patient:  Patient reports 90% improvement.  Continues to have pain in the R calf and thigh 2-3/10 at worst.  Is able to walk well and maintain an erect posture.  Can stand for a period of time, but pain increases after 10 minutes.    .  Current pain level is 2/10.     Previous pain level was  3/10(>3/10 with standing and walking).   Changes in function:  Yes (See Goal flowsheet attached for changes in current functional level)  Adverse reaction to treatment or activity: None    OBJECTIVE   Patient now demonstrating full flexion and min loss extension.     ASSESSMENT/PLAN  Diagnosis: LBP   Updated problem list and treatment plan:   Pain - HEP  Decreased ROM/flexibility - HEP  Decreased function - HEP  STG/LTGs have been met or progress has been made towards goals:  Yes, please see goal flowsheet for most current information  Assessment of Progress: current status is unknown.    Last current status: Pt is progressing as expected   Self Management Plans:  HEP  I have re-evaluated this patient and find that the nature, scope, duration and intensity of the therapy is appropriate for the medical condition of the patient.  Fred continues to require the following intervention to meet STG and LTG's:  HEP.    Recommendations:  Discharge with current home program.  Patient to follow up with MD as needed.    Please refer to the daily flowsheet for treatment today, total treatment time and time spent  performing 1:1 timed codes.

## 2019-04-23 ENCOUNTER — TELEPHONE (OUTPATIENT)
Dept: FAMILY MEDICINE | Facility: CLINIC | Age: 44
End: 2019-04-23

## 2019-04-23 DIAGNOSIS — E11.9 TYPE 2 DIABETES MELLITUS WITHOUT COMPLICATION, WITHOUT LONG-TERM CURRENT USE OF INSULIN (H): ICD-10-CM

## 2019-04-23 NOTE — LETTER
[address]  [address]  [phone]    April 25, 2019      Fred Núñez  [address] [address] [address]      Dear Fred,    We have refilled your Metformin for one month.   We will need to see you for an office visit and fasting labs before any additional refills can be given.  Please call [phone] to schedule this appointment.      Thank you,    [facility]

## 2019-04-24 NOTE — TELEPHONE ENCOUNTER
"Requested Prescriptions   Pending Prescriptions Disp Refills     metFORMIN (GLUCOPHAGE) 500 MG tablet [Pharmacy Med Name: METFORMIN HCL TABS 500MG] 90 tablet 1     Sig: TAKE 1 TABLET DAILY WITH DINNER       Biguanide Agents Passed - 4/23/2019  8:09 PM        Passed - Blood pressure less than 140/90 in past 6 months     BP Readings from Last 3 Encounters:   11/12/18 130/82   09/07/18 124/86   09/04/18 135/78                 Passed - Patient has documented LDL within the past 12 mos.     Recent Labs   Lab Test 11/08/18  0653   LDL 73             Passed - Patient has had a Microalbumin in the past 15 mos.     Recent Labs   Lab Test 11/08/18  0653   MICROL <5   UMALCR Unable to calculate due to low value             Passed - Patient is age 10 or older        Passed - Patient has documented A1c within the specified period of time.     If HgbA1C is 8 or greater, it needs to be on file within the past 3 months.  If less than 8, must be on file within the past 6 months.     Recent Labs   Lab Test 11/08/18  0653   A1C 5.8*             Passed - Patient's CR is NOT>1.4 OR Patient's EGFR is NOT<45 within past 12 mos.     Recent Labs   Lab Test 11/08/18  0653   GFRESTIMATED >90   GFRESTBLACK >90       Recent Labs   Lab Test 11/08/18  0653   CR 0.90             Passed - Patient does NOT have a diagnosis of CHF.        Passed - Medication is active on med list        Passed - Recent (6 mo) or future (30 days) visit within the authorizing provider's specialty     Patient had office visit in the last 6 months or has a visit in the next 30 days with authorizing provider or within the authorizing provider's specialty.  See \"Patient Info\" tab in inbasket, or \"Choose Columns\" in Meds & Orders section of the refill encounter.            metFORMIN (GLUCOPHAGE) 500 MG tablet  Last Written Prescription Date:  11/12/18  Last Fill Quantity: 90,  # refills: 1   Last office visit: 11/12/2018 with prescribing provider:  Dr. Baldwin   Future " Office Visit:

## 2019-05-15 ENCOUNTER — DOCUMENTATION ONLY (OUTPATIENT)
Dept: LAB | Facility: CLINIC | Age: 44
End: 2019-05-15

## 2019-05-15 ENCOUNTER — OFFICE VISIT (OUTPATIENT)
Dept: FAMILY MEDICINE | Facility: CLINIC | Age: 44
End: 2019-05-15
Payer: COMMERCIAL

## 2019-05-15 VITALS
OXYGEN SATURATION: 100 % | DIASTOLIC BLOOD PRESSURE: 80 MMHG | HEIGHT: 64 IN | HEART RATE: 95 BPM | WEIGHT: 174 LBS | BODY MASS INDEX: 29.71 KG/M2 | TEMPERATURE: 98.4 F | SYSTOLIC BLOOD PRESSURE: 132 MMHG

## 2019-05-15 DIAGNOSIS — E11.9 TYPE 2 DIABETES MELLITUS WITHOUT COMPLICATION, WITHOUT LONG-TERM CURRENT USE OF INSULIN (H): ICD-10-CM

## 2019-05-15 DIAGNOSIS — I10 ESSENTIAL HYPERTENSION WITH GOAL BLOOD PRESSURE LESS THAN 140/90: ICD-10-CM

## 2019-05-15 DIAGNOSIS — L03.114 CELLULITIS OF LEFT UPPER EXTREMITY: Primary | ICD-10-CM

## 2019-05-15 PROCEDURE — 99214 OFFICE O/P EST MOD 30 MIN: CPT | Performed by: FAMILY MEDICINE

## 2019-05-15 RX ORDER — CEPHALEXIN 500 MG/1
500 CAPSULE ORAL 3 TIMES DAILY
Qty: 21 CAPSULE | Refills: 0 | Status: SHIPPED | OUTPATIENT
Start: 2019-05-15 | End: 2019-05-21

## 2019-05-15 RX ORDER — LISINOPRIL 2.5 MG/1
2.5 TABLET ORAL DAILY
Qty: 90 TABLET | Refills: 1 | Status: SHIPPED | OUTPATIENT
Start: 2019-05-15 | End: 2019-09-02

## 2019-05-15 RX ORDER — TRIAMTERENE AND HYDROCHLOROTHIAZIDE 37.5; 25 MG/1; MG/1
1 CAPSULE ORAL DAILY
Qty: 90 CAPSULE | Refills: 1 | Status: SHIPPED | OUTPATIENT
Start: 2019-05-15 | End: 2019-07-02

## 2019-05-15 ASSESSMENT — MIFFLIN-ST. JEOR: SCORE: 1585.26

## 2019-05-15 NOTE — PROGRESS NOTES
"  SUBJECTIVE:   Fred Núñez is a 43 year old male who presents to clinic today for the following   health issues:      Joint Pain    Onset: 1 day    Description:   Location: left elbow  Character: Sharp    Intensity: moderate    Progression of Symptoms: worse    Accompanying Signs & Symptoms:  Other symptoms: swelling, red and warm to the touch    History:   Previous similar pain: no       Precipitating factors:   Trauma or overuse: None that patient is aware of but patient was woken up multiple times during the night due to hitting elbow.    Alleviating factors:  Improved by: Ibuprofen    Therapies Tried and outcome: ibuprofen with some relief with pain     SUBJECTIVE:   Here today with the above.  Started about a day ago when he noticed his left elbow felt a bit puffy.  Is gotten a little bit more tender and warm over the course of the last day.  Does not hurt to move his elbow, only when he leans on it or bumps.  No fevers or chills or systemic symptoms.  Does not remember any particular scratch or injury.  No other joints affected.  Upcoming lab work to follow-up on diabetes tomorrow.  Continues to stay physically active and diet is good.  Take medications as prescribed without side effects    Review of systems otherwise negative.  Past medical, family, and social history reviewed and updated in chart.    OBJECTIVE:  /80   Pulse 95   Temp 98.4  F (36.9  C) (Oral)   Ht 1.626 m (5' 4\")   Wt 78.9 kg (174 lb)   SpO2 100%   BMI 29.87 kg/m     Alert, pleasant, upbeat, and in no apparent discomfort.  S1 and S2 normal, no murmurs, clicks, gallops or rubs. Regular rate and rhythm. Chest is clear; no wheezes or rales. No edema or JVD.  LUE - There is a 8 cm area of warm erythema surrounding his left elbow, but there does not appear to be any fluid collection, drainage point, etc.  Past labs reviewed with the patient.     ASSESSMENT / PLAN:  (L03.114) Cellulitis of left upper extremity  (primary encounter " diagnosis)  Comment: Likely some sort of superficial scratches and is gotten infected.  Discussed expected course of the next couple of days and if expanding or not resolving I want to see this again to consider culture, etc.  Plan: cephALEXin (KEFLEX) 500 MG capsule            (E11.9) Type 2 diabetes mellitus without complication, without long-term current use of insulin (H)  Comment: Recheck and adjust as needed  Plan: lisinopril (PRINIVIL/ZESTRIL) 2.5 MG tablet,         metFORMIN (GLUCOPHAGE) 500 MG tablet, **A1C         FUTURE anytime            (I10) Essential hypertension with goal blood pressure less than 140/90  Comment: Stable on current dosage.  Continue same  Plan: lisinopril (PRINIVIL/ZESTRIL) 2.5 MG tablet,         triamterene-HCTZ (DYAZIDE) 37.5-25 MG capsule            Follow up 2 days if elbow not improving or 6 months for diabetes  SIfeoma Baldwin MD    (Chart documentation completed in part with Dragon voice-recognition software.  Even though reviewed some grammatical, spelling, and word errors may remain.)

## 2019-05-16 DIAGNOSIS — I10 ESSENTIAL HYPERTENSION WITH GOAL BLOOD PRESSURE LESS THAN 140/90: ICD-10-CM

## 2019-05-16 DIAGNOSIS — E11.9 TYPE 2 DIABETES MELLITUS WITHOUT COMPLICATION, WITHOUT LONG-TERM CURRENT USE OF INSULIN (H): ICD-10-CM

## 2019-05-16 LAB
ANION GAP SERPL CALCULATED.3IONS-SCNC: 11 MMOL/L (ref 3–14)
BUN SERPL-MCNC: 14 MG/DL (ref 7–30)
CALCIUM SERPL-MCNC: 8.9 MG/DL (ref 8.5–10.1)
CHLORIDE SERPL-SCNC: 102 MMOL/L (ref 94–109)
CO2 SERPL-SCNC: 26 MMOL/L (ref 20–32)
CREAT SERPL-MCNC: 0.91 MG/DL (ref 0.66–1.25)
GFR SERPL CREATININE-BSD FRML MDRD: >90 ML/MIN/{1.73_M2}
GLUCOSE SERPL-MCNC: 87 MG/DL (ref 70–99)
HBA1C MFR BLD: 5.8 % (ref 0–5.6)
POTASSIUM SERPL-SCNC: 4.1 MMOL/L (ref 3.4–5.3)
SODIUM SERPL-SCNC: 139 MMOL/L (ref 133–144)

## 2019-05-16 PROCEDURE — 36415 COLL VENOUS BLD VENIPUNCTURE: CPT | Performed by: FAMILY MEDICINE

## 2019-05-16 PROCEDURE — 83036 HEMOGLOBIN GLYCOSYLATED A1C: CPT | Performed by: FAMILY MEDICINE

## 2019-05-16 PROCEDURE — 80048 BASIC METABOLIC PNL TOTAL CA: CPT | Performed by: FAMILY MEDICINE

## 2019-05-21 ENCOUNTER — OFFICE VISIT (OUTPATIENT)
Dept: FAMILY MEDICINE | Facility: CLINIC | Age: 44
End: 2019-05-21
Payer: COMMERCIAL

## 2019-05-21 VITALS
SYSTOLIC BLOOD PRESSURE: 128 MMHG | WEIGHT: 173 LBS | DIASTOLIC BLOOD PRESSURE: 78 MMHG | BODY MASS INDEX: 29.53 KG/M2 | TEMPERATURE: 98.5 F | OXYGEN SATURATION: 100 % | HEIGHT: 64 IN | HEART RATE: 85 BPM

## 2019-05-21 DIAGNOSIS — L03.114 CELLULITIS OF LEFT UPPER EXTREMITY: ICD-10-CM

## 2019-05-21 DIAGNOSIS — E78.5 HYPERLIPIDEMIA LDL GOAL <70: ICD-10-CM

## 2019-05-21 DIAGNOSIS — Z12.11 SPECIAL SCREENING FOR MALIGNANT NEOPLASMS, COLON: ICD-10-CM

## 2019-05-21 DIAGNOSIS — I10 ESSENTIAL HYPERTENSION WITH GOAL BLOOD PRESSURE LESS THAN 140/90: ICD-10-CM

## 2019-05-21 DIAGNOSIS — E11.9 TYPE 2 DIABETES MELLITUS WITHOUT COMPLICATION, WITHOUT LONG-TERM CURRENT USE OF INSULIN (H): Primary | ICD-10-CM

## 2019-05-21 PROCEDURE — 99214 OFFICE O/P EST MOD 30 MIN: CPT | Performed by: FAMILY MEDICINE

## 2019-05-21 RX ORDER — ATORVASTATIN CALCIUM 10 MG/1
10 TABLET, FILM COATED ORAL AT BEDTIME
Qty: 90 TABLET | Refills: 3 | Status: SHIPPED | OUTPATIENT
Start: 2019-05-21 | End: 2020-01-16

## 2019-05-21 RX ORDER — CEPHALEXIN 500 MG/1
500 CAPSULE ORAL 3 TIMES DAILY
Qty: 1 CAPSULE | Refills: 0 | Status: SHIPPED | OUTPATIENT
Start: 2019-05-21 | End: 2021-01-18

## 2019-05-21 RX ORDER — CEPHALEXIN 500 MG/1
500 CAPSULE ORAL 3 TIMES DAILY
Qty: 21 CAPSULE | Refills: 0 | Status: SHIPPED | OUTPATIENT
Start: 2019-05-21 | End: 2019-05-21

## 2019-05-21 RX ORDER — AMLODIPINE BESYLATE 5 MG/1
5 TABLET ORAL DAILY
Qty: 90 TABLET | Refills: 1 | Status: SHIPPED | OUTPATIENT
Start: 2019-05-21 | End: 2019-12-08

## 2019-05-21 RX ORDER — CEPHALEXIN 500 MG/1
500 CAPSULE ORAL 3 TIMES DAILY
Qty: 21 CAPSULE | Refills: 0 | Status: SHIPPED | OUTPATIENT
Start: 2019-05-21 | End: 2019-05-29

## 2019-05-21 ASSESSMENT — MIFFLIN-ST. JEOR: SCORE: 1580.72

## 2019-05-21 NOTE — PROGRESS NOTES
"Subjective     Fred Núñez is a 43 year old male who presents to clinic today for the following health issues:    HPI   Diabetes Follow-up  Patient presents to discuss recent lab results.    How often are you checking your blood sugar? Not at all    What time of day are you checking your blood sugars (select all that apply)? NA    Have you had any blood sugars above 200?  No    Have you had any blood sugars below 70?  No    What symptoms do you notice when your blood sugar is low? NA     What concerns do you have today about your diabetes? None     Do you have any of these symptoms? (Select all that apply)  No numbness or tingling in feet.  No redness, sores or blisters on feet.  No complaints of excessive thirst.  No reports of blurry vision.  No significant changes to weight.     Have you had a diabetic eye exam in the last 12 months? No    BP Readings from Last 2 Encounters:   05/21/19 128/78   05/15/19 132/80     Hemoglobin A1C (%)   Date Value   05/16/2019 5.8 (H)   11/08/2018 5.8 (H)     LDL Cholesterol Calculated (mg/dL)   Date Value   11/08/2018 73   11/16/2017 65       Diabetes Management Resources    Amount of exercise or physical activity: 1 day/week for an average of 15-30 minutes    Problems taking medications regularly: No    Medication side effects: none    Diet: diabetic    SUBJECTIVE:  Here today in follow-up of diabetes, hypertension, lipids.  Generally doing well overall.  Take medications as prescribed without side effects.  Reviewed recent lab work which looks great.  Cellulitis of his left elbow has improved significantly.  Still little warm and red around the elbow but no fever or systemic symptoms.  No side effects from the Keflex.    Review of systems otherwise negative.  Past medical, family, and social history reviewed and updated in chart.    OBJECTIVE:  /78   Pulse 85   Temp 98.5  F (36.9  C) (Oral)   Ht 1.626 m (5' 4\")   Wt 78.5 kg (173 lb)   SpO2 100%   BMI 29.70 kg/m  "   Alert, pleasant, upbeat, and in no apparent discomfort.  S1 and S2 normal, no murmurs, clicks, gallops or rubs. Regular rate and rhythm. Chest is clear; no wheezes or rales. No edema or JVD.  LUE -significantly reduced erythema is a small area directly over the olecranon.  This is a little bit warm and red  FEET: both sides normal; no swelling, tenderness or skin or vascular lesions.  Sensation is intact. Peripheral pulses are palpable. Toenails are normal.   Past labs reviewed with the patient.     ASSESSMENT / PLAN:  (E11.9) Type 2 diabetes mellitus without complication, without long-term current use of insulin (H)  (primary encounter diagnosis)  Comment: Under fantastic control.  Continue same regimen  Plan:     (I10) Essential hypertension with goal blood pressure less than 140/90  Comment: Borderline but continue to follow  Plan: amLODIPine (NORVASC) 5 MG tablet            (E78.5) Hyperlipidemia LDL goal <70  Comment:   Plan: atorvastatin (LIPITOR) 10 MG tablet            (L03.114) Cellulitis of left upper extremity  Comment:   Plan: cephALEXin (KEFLEX) 500 MG capsule            Follow up 6 months  BURAK Baldwin MD    (Chart documentation completed in part with Dragon voice-recognition software.  Even though reviewed some grammatical, spelling, and word errors may remain.)

## 2019-05-28 ENCOUNTER — MYC MEDICAL ADVICE (OUTPATIENT)
Dept: FAMILY MEDICINE | Facility: CLINIC | Age: 44
End: 2019-05-28

## 2019-05-29 ENCOUNTER — MYC MEDICAL ADVICE (OUTPATIENT)
Dept: FAMILY MEDICINE | Facility: CLINIC | Age: 44
End: 2019-05-29

## 2019-05-29 ENCOUNTER — OFFICE VISIT (OUTPATIENT)
Dept: FAMILY MEDICINE | Facility: CLINIC | Age: 44
End: 2019-05-29
Payer: COMMERCIAL

## 2019-05-29 VITALS
WEIGHT: 173 LBS | BODY MASS INDEX: 29.53 KG/M2 | DIASTOLIC BLOOD PRESSURE: 80 MMHG | OXYGEN SATURATION: 98 % | HEIGHT: 64 IN | TEMPERATURE: 98.3 F | HEART RATE: 88 BPM | SYSTOLIC BLOOD PRESSURE: 130 MMHG

## 2019-05-29 DIAGNOSIS — L03.114 CELLULITIS OF LEFT UPPER EXTREMITY: ICD-10-CM

## 2019-05-29 PROCEDURE — 99214 OFFICE O/P EST MOD 30 MIN: CPT | Performed by: FAMILY MEDICINE

## 2019-05-29 RX ORDER — DOXYCYCLINE HYCLATE 100 MG
100 TABLET ORAL 2 TIMES DAILY
Qty: 20 TABLET | Refills: 0 | Status: SHIPPED | OUTPATIENT
Start: 2019-05-29 | End: 2019-06-08

## 2019-05-29 ASSESSMENT — MIFFLIN-ST. JEOR: SCORE: 1580.72

## 2019-05-29 NOTE — PROGRESS NOTES
"Subjective     Fred Núñez is a 43 year old male who presents to clinic today for the following health issues:    HPI   Joint Pain Follow-Up    Onset: 2 weeks    Description:   Location: left elbow  Character: Mild pain but turns to sharp pains if patient bumps area    Intensity: mild    Progression of Symptoms: Slowly improving     Accompanying Signs & Symptoms:  Other symptoms: warmth, swelling and redness    Alleviating factors:  Improved by: Keflex    Therapies Tried and outcome: Keflex with slow relief    SUBJECTIVE:  Here today in follow-up of left elbow cellulitis.  We have used a couple of courses of Keflex and is definitely improved things but it still somewhat tender and swollen.  Does not bother him from a range of motion standpoint, only when he bumps it.  No fevers or chills or systemic symptoms.    Review of systems otherwise negative.  Past medical, family, and social history reviewed and updated in chart.    OBJECTIVE:  /80   Pulse 88   Temp 98.3  F (36.8  C) (Oral)   Ht 1.626 m (5' 4\")   Wt 78.5 kg (173 lb)   SpO2 98%   BMI 29.70 kg/m    Alert, pleasant, upbeat, and in no apparent discomfort.  S1 and S2 normal, no murmurs, clicks, gallops or rubs. Regular rate and rhythm. Chest is clear; no wheezes or rales. No edema or JVD.   left elbow with a 2 cm area of erythema directly over the olecranon and somewhat puffiness.  The skin is slightly peeled but there is no obvious drainage point  Past labs reviewed with the patient.     ASSESSMENT / PLAN:  (L03.114) Cellulitis of left upper extremity  Comment: Improved but not fully resolved.  I do not get the impression that this is any type of intra-articular infection and I think we just need to go a little bit longer on a slightly broader spectrum antibiotic.  But it does not strike me as MRSA by its appearance nor his history.  I would like a course of doxycycline and if this does not fully resolve like to get him in with " orthopedics.  Plan: doxycycline hyclate (VIBRA-TABS) 100 MG tablet        Discussed mechanism of action of the proposed medication, as well as potential effects, both good and bad.  Patient expressed understanding and agreed with treatment.     Follow up contact me in 1 week  BURAK Baldwin MD    (Chart documentation completed in part with Dragon voice-recognition software.  Even though reviewed some grammatical, spelling, and word errors may remain.)

## 2019-06-14 ENCOUNTER — OFFICE VISIT (OUTPATIENT)
Dept: ORTHOPEDICS | Facility: CLINIC | Age: 44
End: 2019-06-14
Attending: FAMILY MEDICINE
Payer: COMMERCIAL

## 2019-06-14 VITALS
SYSTOLIC BLOOD PRESSURE: 137 MMHG | HEIGHT: 65 IN | BODY MASS INDEX: 28.82 KG/M2 | WEIGHT: 173 LBS | HEART RATE: 97 BPM | OXYGEN SATURATION: 97 % | RESPIRATION RATE: 16 BRPM | DIASTOLIC BLOOD PRESSURE: 87 MMHG

## 2019-06-14 DIAGNOSIS — M70.22 OLECRANON BURSITIS OF LEFT ELBOW: Primary | ICD-10-CM

## 2019-06-14 PROCEDURE — 99213 OFFICE O/P EST LOW 20 MIN: CPT | Performed by: FAMILY MEDICINE

## 2019-06-14 ASSESSMENT — MIFFLIN-ST. JEOR: SCORE: 1596.6

## 2019-06-14 ASSESSMENT — PAIN SCALES - GENERAL: PAINLEVEL: MODERATE PAIN (4)

## 2019-06-14 NOTE — LETTER
"    6/14/2019         RE: Fred Núñez  [address] [facility] [address]        Dear Colleague,    Thank you for referring your patient, Fred Núñez, to the [facility]. Please see a copy of my visit note below.    [facility]      Patient is a 43 year old male who presents to the office today for: Left elbow olecranon bursitis.  Roughly 1 month ago the patient noted a small abrasion on his posterior left elbow.  Over the following days he noticed increase in swelling, pain, redness and warmth and presented to his primary care provider who started him on a course of Keflex.  He underwent a subsequent course of Keflex followed by 10 days of doxycycline for a total of 24 days of antibiotic treatment and has had significant improvement.  His last dose of doxycycline was 6/8/2019.  Since that time he has noted continued gradual improvement.  Still has some persistent swelling and a firmness to palpation over the olecranon but otherwise the swelling is improved dramatically.  There is no more redness, and it is nontender.  He is otherwise feeling well.  Has no pain with movement of the elbow.        Past Medical History, Current Medications, and Allergies are reviewed in the electronic medical record as appropriate.     ROS: Pertinent items are noted in HPI.  Constitutional: negative for fevers, chills and malaise  Cardiovascular: negative for dyspnea, fatigue, lower extremity edema  Integument/breast: negative for rash, skin lesion(s) and skin color change  Neurological: negative for paresthesia and weakness      EXAM:/87 (BP Location: Left arm, Patient Position: Chair, Cuff Size: Adult Regular)   Pulse 97   Resp 16   Ht 1.651 m (5' 5\")   Wt 78.5 kg (173 lb)   SpO2 97%   BMI 28.79 kg/m       General: Alert, pleasant, no distress  Left elbow: There is a small, firm, area of swelling over the olecranon.  This area is nontender to palpation.  It does " not feel boggy, indurated or fluctuant.  It is not warm nor does it feel like a fluid collection.  There is no surrounding erythema warmth or tenderness.  The patient has full range of motion of the left elbow without any significant pain or weakness.  No pain with strength testing of the left elbow.    Imaging: No imaging this visit.      Assessment: Patient is a 43 year old male with olecranon bursitis of the left elbow of infectious etiology based on history, now continuing to improve after 1 week off of antibiotic.  There is no current sign of active infection based on current history and exam today.    Recommendations:   Reviewed natural course and treatment of olecranon bursitis with patient in detail.  Discussed that the swelling that he has currently may be persistent for a number of weeks though there does not seem to be any active infection clinically.  Recommended avoidance of overuse of the elbow or direct pressure or percussion to the elbow whenever avoidable.  Recommended call or follow-up should symptoms change, particularly if he experiences a recurrence in pain, redness, or induration of the area.  Patient is in agreement and understanding of this plan.    Angelo Penaloza MD                Again, thank you for allowing me to participate in the care of your patient.        Sincerely,        Angelo Penaloza MD

## 2019-06-14 NOTE — PROGRESS NOTES
"[facility]      Patient is a 43 year old male who presents to the office today for: Left elbow olecranon bursitis.  Roughly 1 month ago the patient noted a small abrasion on his posterior left elbow.  Over the following days he noticed increase in swelling, pain, redness and warmth and presented to his primary care provider who started him on a course of Keflex.  He underwent a subsequent course of Keflex followed by 10 days of doxycycline for a total of 24 days of antibiotic treatment and has had significant improvement.  His last dose of doxycycline was 6/8/2019.  Since that time he has noted continued gradual improvement.  Still has some persistent swelling and a firmness to palpation over the olecranon but otherwise the swelling is improved dramatically.  There is no more redness, and it is nontender.  He is otherwise feeling well.  Has no pain with movement of the elbow.        Past Medical History, Current Medications, and Allergies are reviewed in the electronic medical record as appropriate.     ROS: Pertinent items are noted in HPI.  Constitutional: negative for fevers, chills and malaise  Cardiovascular: negative for dyspnea, fatigue, lower extremity edema  Integument/breast: negative for rash, skin lesion(s) and skin color change  Neurological: negative for paresthesia and weakness      EXAM:/87 (BP Location: Left arm, Patient Position: Chair, Cuff Size: Adult Regular)   Pulse 97   Resp 16   Ht 1.651 m (5' 5\")   Wt 78.5 kg (173 lb)   SpO2 97%   BMI 28.79 kg/m      General: Alert, pleasant, no distress  Left elbow: There is a small, firm, area of swelling over the olecranon.  This area is nontender to palpation.  It does not feel boggy, indurated or fluctuant.  It is not warm nor does it feel like a fluid collection.  There is no surrounding erythema warmth or tenderness.  The patient has full range of motion of the left elbow without any significant pain or weakness.  " No pain with strength testing of the left elbow.    Imaging: No imaging this visit.      Assessment: Patient is a 43 year old male with olecranon bursitis of the left elbow of infectious etiology based on history, now continuing to improve after 1 week off of antibiotic.  There is no current sign of active infection based on current history and exam today.    Recommendations:   Reviewed natural course and treatment of olecranon bursitis with patient in detail.  Discussed that the swelling that he has currently may be persistent for a number of weeks though there does not seem to be any active infection clinically.  Recommended avoidance of overuse of the elbow or direct pressure or percussion to the elbow whenever avoidable.  Recommended call or follow-up should symptoms change, particularly if he experiences a recurrence in pain, redness, or induration of the area.  Patient is in agreement and understanding of this plan.    Angelo Penaloza MD

## 2019-06-14 NOTE — NURSING NOTE
"Fred Núñez's chief complaint for this visit includes:  Chief Complaint   Patient presents with     Left Elbow - Edema, Pain     PCP: Dolores Baldwin    Referring Provider:  Dolores Baldwin MD  [address] [facility] [place] [address]    /87 (BP Location: Left arm, Patient Position: Chair, Cuff Size: Adult Regular)   Pulse 97   Resp 16   Ht 1.651 m (5' 5\")   Wt 78.5 kg (173 lb)   SpO2 97%   BMI 28.79 kg/m    Moderate Pain (4)     Do you need any medication refills at today's visit? n/a    "

## 2019-06-23 DIAGNOSIS — E11.9 TYPE 2 DIABETES MELLITUS WITHOUT COMPLICATION, WITHOUT LONG-TERM CURRENT USE OF INSULIN (H): ICD-10-CM

## 2019-06-24 NOTE — TELEPHONE ENCOUNTER
"Requested Prescriptions   Pending Prescriptions Disp Refills     metFORMIN (GLUCOPHAGE) 500 MG tablet [Pharmacy Med Name: METFORMIN HCL TABS 500MG]  Last Written Prescription Date:  5/15/19  Last Fill Quantity: 90 tablet,  # refills: 1   Last office visit: 5/29/2019 with prescribing provider:  Dr. Baldwin   Future Office Visit:     30 tablet 0     Sig: TAKE 1 TABLET DAILY WITH DINNER, ALICIA REFILL, NEEDS OFFICE VISIT BEFORE FURTHER REFILLS       Biguanide Agents Passed - 6/23/2019  7:57 PM        Passed - Blood pressure less than 140/90 in past 6 months     BP Readings from Last 3 Encounters:   06/14/19 137/87   05/29/19 130/80   05/21/19 128/78                 Passed - Patient has documented LDL within the past 12 mos.     Recent Labs   Lab Test 11/08/18  0653   LDL 73             Passed - Patient has had a Microalbumin in the past 15 mos.     Recent Labs   Lab Test 11/08/18  0653   MICROL <5   UMALCR Unable to calculate due to low value             Passed - Patient is age 10 or older        Passed - Patient has documented A1c within the specified period of time.     If HgbA1C is 8 or greater, it needs to be on file within the past 3 months.  If less than 8, must be on file within the past 6 months.     Recent Labs   Lab Test 05/16/19  0653   A1C 5.8*             Passed - Patient's CR is NOT>1.4 OR Patient's EGFR is NOT<45 within past 12 mos.     Recent Labs   Lab Test 05/16/19  0653   GFRESTIMATED >90   GFRESTBLACK >90       Recent Labs   Lab Test 05/16/19  0653   CR 0.91             Passed - Patient does NOT have a diagnosis of CHF.        Passed - Medication is active on med list        Passed - Recent (6 mo) or future (30 days) visit within the authorizing provider's specialty     Patient had office visit in the last 6 months or has a visit in the next 30 days with authorizing provider or within the authorizing provider's specialty.  See \"Patient Info\" tab in inbasket, or \"Choose Columns\" in Meds & Orders " section of the refill encounter.

## 2019-06-26 NOTE — TELEPHONE ENCOUNTER
Recently sent to local pharmacy, this request is from mail order pharmacy.  Prescription approved per [facility] Refill Protocol.    Indiana Pederson RN

## 2019-07-02 DIAGNOSIS — I10 ESSENTIAL HYPERTENSION WITH GOAL BLOOD PRESSURE LESS THAN 140/90: ICD-10-CM

## 2019-07-03 RX ORDER — TRIAMTERENE AND HYDROCHLOROTHIAZIDE 37.5; 25 MG/1; MG/1
CAPSULE ORAL
Qty: 90 CAPSULE | Refills: 1 | Status: SHIPPED | OUTPATIENT
Start: 2019-07-03 | End: 2020-01-02

## 2019-07-03 NOTE — TELEPHONE ENCOUNTER
"Requested Prescriptions   Pending Prescriptions Disp Refills     triamterene-HCTZ (DYAZIDE) 37.5-25 MG capsule [Pharmacy Med Name: TRIAMTERENE/HCTZ CAPS 37.5/25] 90 capsule 1     Sig: TAKE 1 CAPSULE DAILY       Diuretics (Including Combos) Protocol Passed - 7/2/2019 11:18 PM        Passed - Blood pressure under 140/90 in past 12 months     BP Readings from Last 3 Encounters:   06/14/19 137/87   05/29/19 130/80   05/21/19 128/78                 Passed - Recent (12 mo) or future (30 days) visit within the authorizing provider's specialty     Patient had office visit in the last 12 months or has a visit in the next 30 days with authorizing provider or within the authorizing provider's specialty.  See \"Patient Info\" tab in inbasket, or \"Choose Columns\" in Meds & Orders section of the refill encounter.              Passed - Medication is active on med list        Passed - Patient is age 18 or older        Passed - Normal serum creatinine on file in past 12 months     Recent Labs   Lab Test 05/16/19  0653   CR 0.91              Passed - Normal serum potassium on file in past 12 months     Recent Labs   Lab Test 05/16/19  0653   POTASSIUM 4.1                    Passed - Normal serum sodium on file in past 12 months     Recent Labs   Lab Test 05/16/19  0653                 triamterene-HCTZ (DYAZIDE) 37.5-25 MG capsule  Last Written Prescription Date:  5/15/19  Last Fill Quantity: 90,  # refills: 1   Last office visit: 5/29/2019 with prescribing provider:  Dr. Baldwin   Future Office Visit:      "

## 2019-07-03 NOTE — TELEPHONE ENCOUNTER
Prescription approved per [facility] [place] Refill Protocol.    Emelia Vidal RN

## 2019-07-08 DIAGNOSIS — I10 ESSENTIAL HYPERTENSION WITH GOAL BLOOD PRESSURE LESS THAN 140/90: ICD-10-CM

## 2019-07-08 NOTE — TELEPHONE ENCOUNTER
"Requested Prescriptions   Pending Prescriptions Disp Refills     amLODIPine (NORVASC) 10 MG tablet [Pharmacy Med Name: AMLODIPINE BESYLATE TABS 10MG] 45 tablet 1     Sig: TAKE ONE-HALF (1/2) TABLET DAILY       Calcium Channel Blockers Protocol  Passed - 7/8/2019  5:47 PM        Passed - Blood pressure under 140/90 in past 12 months     BP Readings from Last 3 Encounters:   06/14/19 137/87   05/29/19 130/80   05/21/19 128/78                 Passed - Recent (12 mo) or future (30 days) visit within the authorizing provider's specialty     Patient had office visit in the last 12 months or has a visit in the next 30 days with authorizing provider or within the authorizing provider's specialty.  See \"Patient Info\" tab in inbasket, or \"Choose Columns\" in Meds & Orders section of the refill encounter.              Passed - Medication is active on med list        Passed - Patient is age 18 or older        Passed - Normal serum creatinine on file in past 12 months     Recent Labs   Lab Test 05/16/19  0653   CR 0.91             amLODIPine (NORVASC) 5 MG tablet  Last Written Prescription Date:  5/21/19  Last Fill Quantity: 90,  # refills: 1   Last office visit: 5/29/2019 with prescribing provider:  Dr. Baldwin   Future Office Visit:      "

## 2019-07-11 RX ORDER — AMLODIPINE BESYLATE 10 MG/1
TABLET ORAL
Qty: 45 TABLET | Refills: 1
Start: 2019-07-11

## 2019-07-11 NOTE — TELEPHONE ENCOUNTER
90 day supply with 1 refills sent on 5/21/19. Should have refills on file at pharmacy. Too soon to refill.      Betina Coleman RN, BSN, PHN

## 2019-09-02 DIAGNOSIS — E11.9 TYPE 2 DIABETES MELLITUS WITHOUT COMPLICATION, WITHOUT LONG-TERM CURRENT USE OF INSULIN (H): ICD-10-CM

## 2019-09-02 DIAGNOSIS — I10 ESSENTIAL HYPERTENSION WITH GOAL BLOOD PRESSURE LESS THAN 140/90: ICD-10-CM

## 2019-09-03 NOTE — TELEPHONE ENCOUNTER
"  Requested Prescriptions   Pending Prescriptions Disp Refills     lisinopril (PRINIVIL/ZESTRIL) 2.5 MG tablet [Pharmacy Med Name: LISINOPRIL TABS 2.5MG]  Last Written Prescription Date:  5/15/19  Last Fill Quantity: 90 tablet,  # refills: 1   Last office visit: 5/29/2019 with prescribing provider:  Dr. Baldwin   Future Office Visit:     90 tablet 4     Sig: TAKE 1 TABLET DAILY       ACE Inhibitors (Including Combos) Protocol Passed - 9/2/2019 10:49 AM        Passed - Blood pressure under 140/90 in past 12 months     BP Readings from Last 3 Encounters:   06/14/19 137/87   05/29/19 130/80   05/21/19 128/78                 Passed - Recent (12 mo) or future (30 days) visit within the authorizing provider's specialty     Patient had office visit in the last 12 months or has a visit in the next 30 days with authorizing provider or within the authorizing provider's specialty.  See \"Patient Info\" tab in inbasket, or \"Choose Columns\" in Meds & Orders section of the refill encounter.              Passed - Medication is active on med list        Passed - Patient is age 18 or older        Passed - Normal serum creatinine on file in past 12 months     Recent Labs   Lab Test 05/16/19  0653   CR 0.91             Passed - Normal serum potassium on file in past 12 months     Recent Labs   Lab Test 05/16/19  0653   POTASSIUM 4.1                   metFORMIN (GLUCOPHAGE) 500 MG tablet [Pharmacy Med Name: METFORMIN HCL TABS 500MG]  Last Written Prescription Date:  6/26/19  Last Fill Quantity: 90 tablet,  # refills: 0   Last office visit: 5/29/2019 with prescribing provider:  Dr. Baldwin   Future Office Visit:     90 tablet 4     Sig: TAKE 1 TABLET DAILY WITH DINNER       Biguanide Agents Passed - 9/2/2019 10:49 AM        Passed - Blood pressure less than 140/90 in past 6 months     BP Readings from Last 3 Encounters:   06/14/19 137/87   05/29/19 130/80   05/21/19 128/78                 Passed - Patient has documented LDL within the " "past 12 mos.     Recent Labs   Lab Test 11/08/18  0653   LDL 73             Passed - Patient has had a Microalbumin in the past 15 mos.     Recent Labs   Lab Test 11/08/18  0653   MICROL <5   UMALCR Unable to calculate due to low value             Passed - Patient is age 10 or older        Passed - Patient has documented A1c within the specified period of time.     If HgbA1C is 8 or greater, it needs to be on file within the past 3 months.  If less than 8, must be on file within the past 6 months.     Recent Labs   Lab Test 05/16/19  0653   A1C 5.8*             Passed - Patient's CR is NOT>1.4 OR Patient's EGFR is NOT<45 within past 12 mos.     Recent Labs   Lab Test 05/16/19  0653   GFRESTIMATED >90   GFRESTBLACK >90       Recent Labs   Lab Test 05/16/19  0653   CR 0.91             Passed - Patient does NOT have a diagnosis of CHF.        Passed - Medication is active on med list        Passed - Recent (6 mo) or future (30 days) visit within the authorizing provider's specialty     Patient had office visit in the last 6 months or has a visit in the next 30 days with authorizing provider or within the authorizing provider's specialty.  See \"Patient Info\" tab in inbasket, or \"Choose Columns\" in Meds & Orders section of the refill encounter.              "

## 2019-09-05 RX ORDER — LISINOPRIL 2.5 MG/1
TABLET ORAL
Qty: 90 TABLET | Refills: 0 | Status: SHIPPED | OUTPATIENT
Start: 2019-09-05 | End: 2020-01-16

## 2019-09-05 NOTE — TELEPHONE ENCOUNTER
Prescription approved per [facility] Refill Protocol.    Carmen Robbins RN, [facility]

## 2019-09-06 ENCOUNTER — OFFICE VISIT (OUTPATIENT)
Dept: FAMILY MEDICINE | Facility: CLINIC | Age: 44
End: 2019-09-06
Payer: COMMERCIAL

## 2019-09-06 ENCOUNTER — ANCILLARY PROCEDURE (OUTPATIENT)
Dept: GENERAL RADIOLOGY | Facility: CLINIC | Age: 44
End: 2019-09-06
Attending: FAMILY MEDICINE
Payer: COMMERCIAL

## 2019-09-06 VITALS
SYSTOLIC BLOOD PRESSURE: 128 MMHG | HEIGHT: 65 IN | DIASTOLIC BLOOD PRESSURE: 82 MMHG | HEART RATE: 85 BPM | OXYGEN SATURATION: 98 % | BODY MASS INDEX: 28.16 KG/M2 | TEMPERATURE: 97.9 F | WEIGHT: 169 LBS

## 2019-09-06 DIAGNOSIS — M25.552 HIP PAIN, LEFT: ICD-10-CM

## 2019-09-06 DIAGNOSIS — M25.552 HIP PAIN, LEFT: Primary | ICD-10-CM

## 2019-09-06 DIAGNOSIS — Z23 NEED FOR PROPHYLACTIC VACCINATION AND INOCULATION AGAINST INFLUENZA: ICD-10-CM

## 2019-09-06 PROCEDURE — 73502 X-RAY EXAM HIP UNI 2-3 VIEWS: CPT | Mod: FY

## 2019-09-06 PROCEDURE — 99213 OFFICE O/P EST LOW 20 MIN: CPT | Mod: 25 | Performed by: FAMILY MEDICINE

## 2019-09-06 PROCEDURE — 90686 IIV4 VACC NO PRSV 0.5 ML IM: CPT | Performed by: FAMILY MEDICINE

## 2019-09-06 PROCEDURE — 90471 IMMUNIZATION ADMIN: CPT | Performed by: FAMILY MEDICINE

## 2019-09-06 ASSESSMENT — MIFFLIN-ST. JEOR: SCORE: 1573.46

## 2019-09-06 NOTE — RESULT ENCOUNTER NOTE
Pat -officially read as normal -this is good news.  So let me know over the course of the next 2 days or so how it is doing.  BURAK Baldwin M.D.

## 2019-09-06 NOTE — PROGRESS NOTES
"Subjective     Fred Núñez is a 44 year old male who presents to clinic today for the following health issues:    HPI   Joint Pain    Onset: Mid August    Description:   Location: Left Hip   Character: Sharp and achey    Intensity: mild to moderate    Progression of Symptoms: intermittent    Accompanying Signs & Symptoms:  Other symptoms: radiation of pain to left knee sometimes     History:   Previous similar pain: YES- Sciatica       Precipitating factors:   Trauma or overuse: YES- Patient had exceeded activity level in the [facility]     Alleviating factors:  Improved by: nothing    Therapies Tried and outcome: Ibuprofen with temporary relief     SUBJECTIVE:  Here today for some left hip pain as above.  Is been going on for almost a month without a specific injury.  But he did start after a trip to the [facility] [place] area.  Lots of walking and carrying and sitting in a canoe.  He feels pain in his left groin, worse with sitting in a deep position or when walking a lot.  Does not really radiate.  Has had some sciatic symptoms down the left leg in the past and that felt completely different.  Is not having any numbness or tingling or similar.  Does not seem related to that at all.  Ibuprofen does help a little bit.    Review of systems otherwise negative.  Past medical, family, and social history reviewed and updated in chart.    OBJECTIVE:  /82 (BP Location: Right arm, Patient Position: Chair, Cuff Size: Adult Regular)   Pulse 85   Temp 97.9  F (36.6  C) (Oral)   Ht 1.651 m (5' 5\")   Wt 76.7 kg (169 lb)   SpO2 98%   BMI 28.12 kg/m    Alert, pleasant, upbeat, and in no apparent discomfort.  S1 and S2 normal, no murmurs, clicks, gallops or rubs. Regular rate and rhythm. Chest is clear; no wheezes or rales. No edema or JVD.  Lower extremities -full range of motion in both knees and hips.  Does not really have much discomfort with range of motion on the left.  But he does have a mild " impingement pain on the left  Past labs reviewed with the patient.   XRAY - my independent reading of the films showed only some mild joint space narrowing but no obvious abnormality    ASSESSMENT / PLAN:  (M25.863) Hip pain, left  (primary encounter diagnosis)  Comment: Might have a little bit of hip impingement related to his previous activities.  So we will try some conservative management using over-the-counter analgesics and range of motion and see how he does over the course of the next week or so.  If not improving we will get him in with sports medicine  Plan: XR Hip Left 2-3 Views, XR Hip Right 2-3 Views            (Z23) Need for prophylactic vaccination and inoculation against influenza  Comment:   Plan: HC FLU VAC PRESRV FREE QUAD SPLIT VIR > 6         MONTHS IM [16594], Vaccine Administration,         Initial [95616]            Follow up 1 to 2 weeks as above  BURAK Baldwin MD    (Chart documentation completed in part with Dragon voice-recognition software.  Even though reviewed some grammatical, spelling, and word errors may remain.)

## 2019-09-06 NOTE — PATIENT INSTRUCTIONS
Maximum dosages or over the counter pain medicines:    - ibuprofen (advil) 2400 mg daily - or 4 tabs three times daily  Or  - Aleve 2 tabs twice daily  AND can add  - acetamenophen (Tylenol) 4000 mg daily - or 2 tabs 4 times daily     Let's try scheduling some Advil or Aleve, etc. as above and working on some range of motion exercises.  We should see some improvement over the course of this next few days or week or so.  If this is not changing at all let me know and I will get you in with a specialist to find out what is going on.

## 2019-11-22 DIAGNOSIS — E11.9 TYPE 2 DIABETES MELLITUS WITHOUT COMPLICATION, WITHOUT LONG-TERM CURRENT USE OF INSULIN (H): ICD-10-CM

## 2019-11-22 DIAGNOSIS — I10 ESSENTIAL HYPERTENSION WITH GOAL BLOOD PRESSURE LESS THAN 140/90: ICD-10-CM

## 2019-11-22 NOTE — TELEPHONE ENCOUNTER
"Requested Prescriptions   Pending Prescriptions Disp Refills     lisinopril (PRINIVIL/ZESTRIL) 2.5 MG tablet [Pharmacy Med Name: LISINOPRIL 2.5 MG TABLET]  Last Written Prescription Date:  95/19  Last Fill Quantity: 90 tablet,  # refills: 0   Last office visit: 9/6/2019 with prescribing provider:  Dr. Baldwin   Future Office Visit:     30 tablet 5     Sig: TAKE 1 TABLET BY MOUTH EVERY DAY       ACE Inhibitors (Including Combos) Protocol Passed - 11/22/2019  4:06 AM        Passed - Blood pressure under 140/90 in past 12 months     BP Readings from Last 3 Encounters:   09/06/19 128/82   06/14/19 137/87   05/29/19 130/80                 Passed - Recent (12 mo) or future (30 days) visit within the authorizing provider's specialty     Patient has had an office visit with the authorizing provider or a provider within the authorizing providers department within the previous 12 mos or has a future within next 30 days. See \"Patient Info\" tab in inbasket, or \"Choose Columns\" in Meds & Orders section of the refill encounter.              Passed - Medication is active on med list        Passed - Patient is age 18 or older        Passed - Normal serum creatinine on file in past 12 months     Recent Labs   Lab Test 05/16/19  0653   CR 0.91             Passed - Normal serum potassium on file in past 12 months     Recent Labs   Lab Test 05/16/19  0653   POTASSIUM 4.1               "

## 2019-11-26 RX ORDER — LISINOPRIL 2.5 MG/1
TABLET ORAL
Qty: 30 TABLET | Refills: 0 | Status: SHIPPED | OUTPATIENT
Start: 2019-11-26 | End: 2020-01-06

## 2019-11-26 NOTE — TELEPHONE ENCOUNTER
OV indicate borderline control and need for follow up in November.     Please schedule patient. Gracie refill given.   Needs appointment for further refills.     Carolyne Lara RN  [facility]

## 2019-12-08 DIAGNOSIS — I10 ESSENTIAL HYPERTENSION WITH GOAL BLOOD PRESSURE LESS THAN 140/90: ICD-10-CM

## 2019-12-09 NOTE — TELEPHONE ENCOUNTER
"Requested Prescriptions   Pending Prescriptions Disp Refills     amLODIPine (NORVASC) 5 MG tablet [Pharmacy Med Name: AMLODIPINE BESYLATE TABS 5MG] 90 tablet 4     Sig: TAKE 1 TABLET DAILY       Calcium Channel Blockers Protocol  Passed - 12/8/2019  5:36 AM        Passed - Blood pressure under 140/90 in past 12 months     BP Readings from Last 3 Encounters:   09/06/19 128/82   06/14/19 137/87   05/29/19 130/80                 Passed - Recent (12 mo) or future (30 days) visit within the authorizing provider's specialty     Patient has had an office visit with the authorizing provider or a provider within the authorizing providers department within the previous 12 mos or has a future within next 30 days. See \"Patient Info\" tab in inbasket, or \"Choose Columns\" in Meds & Orders section of the refill encounter.              Passed - Medication is active on med list        Passed - Patient is age 18 or older        Passed - Normal serum creatinine on file in past 12 months     Recent Labs   Lab Test 05/16/19  0653   CR 0.91             amLODIPine (NORVASC) 5 MG tablet  Last Written Prescription Date:  5/21/19  Last Fill Quantity: 90,  # refills: 1   Last office visit: 9/6/2019 with prescribing provider:  Dr. Baldwin   Future Office Visit:      "

## 2019-12-11 RX ORDER — AMLODIPINE BESYLATE 5 MG/1
TABLET ORAL
Qty: 30 TABLET | Refills: 0 | Status: SHIPPED | OUTPATIENT
Start: 2019-12-11 | End: 2020-01-16

## 2019-12-11 NOTE — TELEPHONE ENCOUNTER
Medication is being filled for 1 time refill only due to:  Patient needs labs A1c. Patient needs to be seen because hypertension follow up.     Kristi Carpenter RN

## 2019-12-16 ENCOUNTER — DOCUMENTATION ONLY (OUTPATIENT)
Dept: LAB | Facility: CLINIC | Age: 44
End: 2019-12-16

## 2019-12-16 DIAGNOSIS — E11.9 TYPE 2 DIABETES MELLITUS WITHOUT COMPLICATION, WITHOUT LONG-TERM CURRENT USE OF INSULIN (H): Primary | ICD-10-CM

## 2019-12-16 NOTE — PROGRESS NOTES
Pt has a lab appointment on 12.19.19, please review their chart and add orders if necessary.    Thank you,    [facility]

## 2019-12-19 DIAGNOSIS — E11.9 TYPE 2 DIABETES MELLITUS WITHOUT COMPLICATION, WITHOUT LONG-TERM CURRENT USE OF INSULIN (H): ICD-10-CM

## 2019-12-19 LAB
ALBUMIN SERPL-MCNC: 3.9 G/DL (ref 3.4–5)
ALP SERPL-CCNC: 69 U/L (ref 40–150)
ALT SERPL W P-5'-P-CCNC: 65 U/L (ref 0–70)
ANION GAP SERPL CALCULATED.3IONS-SCNC: 7 MMOL/L (ref 3–14)
AST SERPL W P-5'-P-CCNC: 32 U/L (ref 0–45)
BILIRUB SERPL-MCNC: 0.3 MG/DL (ref 0.2–1.3)
BUN SERPL-MCNC: 15 MG/DL (ref 7–30)
CALCIUM SERPL-MCNC: 9.1 MG/DL (ref 8.5–10.1)
CHLORIDE SERPL-SCNC: 105 MMOL/L (ref 94–109)
CHOLEST SERPL-MCNC: 155 MG/DL
CO2 SERPL-SCNC: 28 MMOL/L (ref 20–32)
CREAT SERPL-MCNC: 0.79 MG/DL (ref 0.66–1.25)
CREAT UR-MCNC: 97 MG/DL
GFR SERPL CREATININE-BSD FRML MDRD: >90 ML/MIN/{1.73_M2}
GLUCOSE SERPL-MCNC: 94 MG/DL (ref 70–99)
HBA1C MFR BLD: 5.7 % (ref 0–5.6)
HDLC SERPL-MCNC: 44 MG/DL
LDLC SERPL CALC-MCNC: 68 MG/DL
MICROALBUMIN UR-MCNC: <5 MG/L
MICROALBUMIN/CREAT UR: NORMAL MG/G CR (ref 0–17)
NONHDLC SERPL-MCNC: 111 MG/DL
POTASSIUM SERPL-SCNC: 4 MMOL/L (ref 3.4–5.3)
PROT SERPL-MCNC: 7 G/DL (ref 6.8–8.8)
SODIUM SERPL-SCNC: 140 MMOL/L (ref 133–144)
TRIGL SERPL-MCNC: 215 MG/DL
TSH SERPL DL<=0.005 MIU/L-ACNC: 1.26 MU/L (ref 0.4–4)

## 2019-12-19 PROCEDURE — 36415 COLL VENOUS BLD VENIPUNCTURE: CPT | Performed by: FAMILY MEDICINE

## 2019-12-19 PROCEDURE — 84443 ASSAY THYROID STIM HORMONE: CPT | Performed by: FAMILY MEDICINE

## 2019-12-19 PROCEDURE — 82043 UR ALBUMIN QUANTITATIVE: CPT | Performed by: FAMILY MEDICINE

## 2019-12-19 PROCEDURE — 83036 HEMOGLOBIN GLYCOSYLATED A1C: CPT | Performed by: FAMILY MEDICINE

## 2019-12-19 PROCEDURE — 80053 COMPREHEN METABOLIC PANEL: CPT | Performed by: FAMILY MEDICINE

## 2019-12-19 PROCEDURE — 80061 LIPID PANEL: CPT | Performed by: FAMILY MEDICINE

## 2019-12-30 ENCOUNTER — TELEPHONE (OUTPATIENT)
Dept: FAMILY MEDICINE | Facility: CLINIC | Age: 44
End: 2019-12-30

## 2019-12-30 DIAGNOSIS — I10 ESSENTIAL HYPERTENSION WITH GOAL BLOOD PRESSURE LESS THAN 140/90: ICD-10-CM

## 2019-12-30 NOTE — TELEPHONE ENCOUNTER
"Requested Prescriptions   Pending Prescriptions Disp Refills     triamterene-HCTZ (DYAZIDE) 37.5-25 MG capsule [Pharmacy Med Name: TRIAMTERENE/HCTZ CAPS 37.5/25] 90 capsule 4     Sig: TAKE 1 CAPSULE DAILY       Diuretics (Including Combos) Protocol Passed - 12/30/2019 12:08 AM        Passed - Blood pressure under 140/90 in past 12 months     BP Readings from Last 3 Encounters:   09/06/19 128/82   06/14/19 137/87   05/29/19 130/80                 Passed - Recent (12 mo) or future (30 days) visit within the authorizing provider's specialty     Patient has had an office visit with the authorizing provider or a provider within the authorizing providers department within the previous 12 mos or has a future within next 30 days. See \"Patient Info\" tab in inbasket, or \"Choose Columns\" in Meds & Orders section of the refill encounter.              Passed - Medication is active on med list        Passed - Patient is age 18 or older        Passed - Normal serum creatinine on file in past 12 months     Recent Labs   Lab Test 12/19/19  0807   CR 0.79              Passed - Normal serum potassium on file in past 12 months     Recent Labs   Lab Test 12/19/19  0807   POTASSIUM 4.0                    Passed - Normal serum sodium on file in past 12 months     Recent Labs   Lab Test 12/19/19  0807                 triamterene-HCTZ (DYAZIDE) 37.5-25 MG capsule   Last Written Prescription Date:  7/3/19  Last Fill Quantity: 90,  # refills: 1   Last office visit: 9/6/2019 with prescribing provider:  Dr. Baldwin   Future Office Visit:   Next 5 appointments (look out 90 days)    Jan 03, 2020  9:00 AM CST  [facility]ores [facility] [address] [id number] [phone]           "

## 2020-01-02 RX ORDER — TRIAMTERENE AND HYDROCHLOROTHIAZIDE 37.5; 25 MG/1; MG/1
CAPSULE ORAL
Qty: 90 CAPSULE | Refills: 1 | Status: SHIPPED | OUTPATIENT
Start: 2020-01-02 | End: 2020-01-16

## 2020-01-02 NOTE — TELEPHONE ENCOUNTER
Prescription approved per [facility] Refill Protocol.  Carolyne Lara RN  [facility]

## 2020-01-03 DIAGNOSIS — E78.5 HYPERLIPIDEMIA LDL GOAL <70: ICD-10-CM

## 2020-01-03 DIAGNOSIS — E11.9 TYPE 2 DIABETES MELLITUS WITHOUT COMPLICATION, WITHOUT LONG-TERM CURRENT USE OF INSULIN (H): ICD-10-CM

## 2020-01-03 DIAGNOSIS — I10 ESSENTIAL HYPERTENSION WITH GOAL BLOOD PRESSURE LESS THAN 140/90: ICD-10-CM

## 2020-01-06 ENCOUNTER — MYC MEDICAL ADVICE (OUTPATIENT)
Dept: FAMILY MEDICINE | Facility: CLINIC | Age: 45
End: 2020-01-06

## 2020-01-06 RX ORDER — LISINOPRIL 2.5 MG/1
TABLET ORAL
Qty: 90 TABLET | Refills: 0 | Status: SHIPPED | OUTPATIENT
Start: 2020-01-06 | End: 2021-01-18

## 2020-01-07 ENCOUNTER — OFFICE VISIT (OUTPATIENT)
Dept: FAMILY MEDICINE | Facility: CLINIC | Age: 45
End: 2020-01-07
Payer: COMMERCIAL

## 2020-01-07 VITALS
WEIGHT: 171 LBS | HEART RATE: 86 BPM | DIASTOLIC BLOOD PRESSURE: 87 MMHG | SYSTOLIC BLOOD PRESSURE: 127 MMHG | HEIGHT: 65 IN | BODY MASS INDEX: 28.49 KG/M2 | TEMPERATURE: 98.3 F | OXYGEN SATURATION: 98 %

## 2020-01-07 DIAGNOSIS — M54.42 ACUTE LEFT-SIDED LOW BACK PAIN WITH LEFT-SIDED SCIATICA: Primary | ICD-10-CM

## 2020-01-07 DIAGNOSIS — E11.9 TYPE 2 DIABETES MELLITUS WITHOUT COMPLICATION, WITHOUT LONG-TERM CURRENT USE OF INSULIN (H): ICD-10-CM

## 2020-01-07 PROCEDURE — 99213 OFFICE O/P EST LOW 20 MIN: CPT | Performed by: FAMILY MEDICINE

## 2020-01-07 RX ORDER — CYCLOBENZAPRINE HCL 5 MG
5 TABLET ORAL 3 TIMES DAILY PRN
Qty: 21 TABLET | Refills: 1 | Status: SHIPPED | OUTPATIENT
Start: 2020-01-07 | End: 2022-07-01

## 2020-01-07 RX ORDER — METHYLPREDNISOLONE 4 MG
TABLET, DOSE PACK ORAL
Qty: 21 TABLET | Refills: 0 | Status: SHIPPED | OUTPATIENT
Start: 2020-01-07 | End: 2021-01-18

## 2020-01-07 ASSESSMENT — MIFFLIN-ST. JEOR: SCORE: 1582.53

## 2020-01-07 NOTE — TELEPHONE ENCOUNTER
Prescription approved per [facility] Refill Protocol.    Carmen Robbins RN, [facility]

## 2020-01-08 NOTE — PROGRESS NOTES
Subjective     Fred Núñez is a 44 year old male who presents to clinic today for the following health issues:    HPI   Back Pain       Duration: 1/4/20        Specific cause: turning/bending    Description:   Location of pain: Left low back    Character of pain: sharp and dull ache  Pain radiation:radiates into the left buttocks  New numbness or weakness in legs, not attributed to pain:  YES- but is improved today     Intensity: Currently 6-7/10    History:   Pain interferes with job: YES  History of back problems: Self-limited right-sided sciatica last year  Any previous MRI or X-rays: Yes- at [place].  Date 9/16/19  Sees a specialist for back pain:  No  Therapies tried without relief: Physical Therapy    Alleviating factors:   Improved by: None       Precipitating factors:  Worsened by: Lifting and Bending          Accompanying Signs & Symptoms:  Risk of Fracture:  None  Risk of Cauda Equina:  None  Risk of Infection:  None  Risk of Cancer:  None  Risk of Ankylosing Spondylitis:  Onset at age <35, male, AND morning back stiffness. no     SUBJECTIVE:  Here today with the above back symptoms that began a few days ago.  Just prior to this the patient was taking apart his daughter's crib that involved a lot of bending and twisting into awkward positions and lifting and carrying.  No pain at the time but he awoke the next day feeling very stiff in his low back and by the following day was developing some left-sided sciatic symptoms.  Has an aching sensation to his left buttock and down the back of his left leg toward the calf.  No weakness.  No incoordination.  No bowel or bladder symptoms.  Just very stiff.  Had met with physical therapy last year when he had a bout of right-sided sciatica and did well with some basic medical therapy and exercises.  Started getting back to his stretches and exercise last night.    Review of systems otherwise negative.  Past medical, family, and social history reviewed and  "updated in chart.    OBJECTIVE:  /87 (BP Location: Right arm, Patient Position: Chair, Cuff Size: Adult Regular)   Pulse 86   Temp 98.3  F (36.8  C) (Oral)   Ht 1.651 m (5' 5\")   Wt 77.6 kg (171 lb)   SpO2 98%   BMI 28.46 kg/m    Alert, pleasant, upbeat  Since very stiff in the chair  S1 and S2 normal, no murmurs, clicks, gallops or rubs. Regular rate and rhythm. Chest is clear; no wheezes or rales. No edema or JVD.   Back is fairly tight and straight leg raising weakly positive on the left.  Deep tendon reflexes equal bilaterally.  Muscle mass equal bilaterally  Past labs reviewed with the patient.     ASSESSMENT / PLAN:  (M54.42) Acute left-sided low back pain with left-sided sciatica  (primary encounter diagnosis)  Comment: Discussed mechanism of action of the proposed medication, as well as potential effects, both good and bad.  Patient expressed understanding and agreed with treatment.   Plan: methylPREDNISolone (MEDROL DOSEPAK) 4 MG tablet        therapy pack, cyclobenzaprine (FLEXERIL) 5 MG         tablet        I counseled the patient on expected course, recovery, and potential setbacks.  I encouraged activity as tolerated once analgesia onboard.  Suggested heat, ice, stretch, massage - whatever it takes to increase motion.  Patient will contact me as needed throughout recovery course.     (E11.9) Type 2 diabetes mellitus without complication, without long-term current use of insulin (H)  Comment: Watch for elevated blood sugars because of the steroid.  The diabetes is been fantastically controlled  Plan:     Follow up 1 week if not improving  S. Andrez Baldwin MD    (Chart documentation completed in part with Dragon voice-recognition software.  Even though reviewed some grammatical, spelling, and word errors may remain.)            "

## 2020-01-14 ENCOUNTER — MYC MEDICAL ADVICE (OUTPATIENT)
Dept: FAMILY MEDICINE | Facility: CLINIC | Age: 45
End: 2020-01-14

## 2020-01-14 DIAGNOSIS — E11.9 TYPE 2 DIABETES MELLITUS WITHOUT COMPLICATION, WITHOUT LONG-TERM CURRENT USE OF INSULIN (H): ICD-10-CM

## 2020-01-15 NOTE — TELEPHONE ENCOUNTER
"Requested Prescriptions   Pending Prescriptions Disp Refills     metFORMIN (GLUCOPHAGE) 500 MG tablet  Last Written Prescription Date:  9/5/19  Last Fill Quantity: 90 tablet,  # refills: 0   Last office visit: 1/7/2020 with prescribing provider:  Dr. Baldwin   Future Office Visit:     30 tablet 0     Sig: TAKE 1 TABLET DAILY WITH DINNER       Biguanide Agents Passed - 1/14/2020  5:58 PM        Passed - Blood pressure less than 140/90 in past 6 months     BP Readings from Last 3 Encounters:   01/07/20 127/87   09/06/19 128/82   06/14/19 137/87                 Passed - Patient has documented LDL within the past 12 mos.     Recent Labs   Lab Test 12/19/19  0807   LDL 68             Passed - Patient has had a Microalbumin in the past 15 mos.     Recent Labs   Lab Test 12/19/19  0813   MICROL <5   UMALCR Unable to calculate due to low value             Passed - Patient is age 10 or older        Passed - Patient has documented A1c within the specified period of time.     If HgbA1C is 8 or greater, it needs to be on file within the past 3 months.  If less than 8, must be on file within the past 6 months.     Recent Labs   Lab Test 12/19/19  0807   A1C 5.7*             Passed - Patient's CR is NOT>1.4 OR Patient's EGFR is NOT<45 within past 12 mos.     Recent Labs   Lab Test 12/19/19  0807   GFRESTIMATED >90   GFRESTBLACK >90       Recent Labs   Lab Test 12/19/19  0807   CR 0.79             Passed - Patient does NOT have a diagnosis of CHF.        Passed - Medication is active on med list        Passed - Recent (6 mo) or future (30 days) visit within the authorizing provider's specialty     Patient had office visit in the last 6 months or has a visit in the next 30 days with authorizing provider or within the authorizing provider's specialty.  See \"Patient Info\" tab in inbasket, or \"Choose Columns\" in Meds & Orders section of the refill encounter.              "

## 2020-01-16 RX ORDER — AMLODIPINE BESYLATE 5 MG/1
5 TABLET ORAL DAILY
Qty: 90 TABLET | Refills: 1 | Status: SHIPPED | OUTPATIENT
Start: 2020-01-16 | End: 2020-06-03

## 2020-01-16 RX ORDER — LISINOPRIL 2.5 MG/1
2.5 TABLET ORAL DAILY
Qty: 90 TABLET | Refills: 0 | Status: CANCELLED | OUTPATIENT
Start: 2020-01-16

## 2020-01-16 RX ORDER — TRIAMTERENE AND HYDROCHLOROTHIAZIDE 37.5; 25 MG/1; MG/1
1 CAPSULE ORAL DAILY
Qty: 90 CAPSULE | Refills: 1 | Status: SHIPPED | OUTPATIENT
Start: 2020-01-16 | End: 2020-06-03

## 2020-01-16 RX ORDER — ATORVASTATIN CALCIUM 10 MG/1
10 TABLET, FILM COATED ORAL AT BEDTIME
Qty: 90 TABLET | Refills: 1 | Status: SHIPPED | OUTPATIENT
Start: 2020-01-16 | End: 2020-06-03

## 2020-01-16 RX ORDER — LISINOPRIL 2.5 MG/1
2.5 TABLET ORAL DAILY
Qty: 90 TABLET | Refills: 1 | Status: SHIPPED | OUTPATIENT
Start: 2020-01-16 | End: 2020-06-03

## 2020-01-16 NOTE — TELEPHONE ENCOUNTER
Reason for Call:  Other prescription    Detailed comments: Pt calling to follow up on medication request for he has checked with his Pharmacy and they stated they have no received Rx yet and would like for Dr. Baldwin to send in Rx as soon as possible. He would like a call back to confirm Rx has been sent.    Phone Number Patient can be reached at: Home number on file [phone] (home)    Best Time: anytime    Can we leave a detailed message on this number? YES    Call taken on 1/16/2020 at 12:47 PM by Kenneth Weiss

## 2020-01-16 NOTE — TELEPHONE ENCOUNTER
Reason for Call:  Other prescription    Detailed comments: Pt calling to follow up on medication request for he has checked with his Pharmacy and they stated they have no received Rx yet and would like for Dr. Baldwin to send in Rx as soon as possible. He would like a call back to confirm Rx has been sent.    Phone Number Patient can be reached at: Home number on file [phone] (home)    Best Time: anytime    Can we leave a detailed message on this number? YES    Call taken on 1/16/2020 at 12:47 PM by Kenneth Weiss

## 2020-01-16 NOTE — TELEPHONE ENCOUNTER
"Prescriptions approved per [facility] Refill Protocol.  Sent to [facility] mail order pharmacy, as patient requested.    30 day supply of Metformin sent to [facility] on [address]    Please return call to patient to notify  Thank you    Emelia Vidal RN  [facility]            Requested Prescriptions   Pending Prescriptions Disp Refills     amLODIPine (NORVASC) 5 MG tablet 30 tablet 0     Sig: Take 1 tablet (5 mg) by mouth daily       Calcium Channel Blockers Protocol  Passed - 1/16/2020  1:56 PM        Passed - Blood pressure under 140/90 in past 12 months     BP Readings from Last 3 Encounters:   01/07/20 127/87   09/06/19 128/82   06/14/19 137/87                 Passed - Recent (12 mo) or future (30 days) visit within the authorizing provider's specialty     Patient has had an office visit with the authorizing provider or a provider within the authorizing providers department within the previous 12 mos or has a future within next 30 days. See \"Patient Info\" tab in inbasket, or \"Choose Columns\" in Meds & Orders section of the refill encounter.              Passed - Medication is active on med list        Passed - Patient is age 18 or older        Passed - Normal serum creatinine on file in past 12 months     Recent Labs   Lab Test 12/19/19  0807   CR 0.79             atorvastatin (LIPITOR) 10 MG tablet 90 tablet 3     Sig: Take 1 tablet (10 mg) by mouth At Bedtime       Statins Protocol Passed - 1/16/2020  1:56 PM        Passed - LDL on file in past 12 months     Recent Labs   Lab Test 12/19/19  0807   LDL 68             Passed - No abnormal creatine kinase in past 12 months     No lab results found.             Passed - Recent (12 mo) or future (30 days) visit within the authorizing provider's specialty     Patient has had an office visit with the authorizing provider or a provider within the authorizing providers department within the previous 12 mos or has a future within next 30 " "days. See \"Patient Info\" tab in inbasket, or \"Choose Columns\" in Meds & Orders section of the refill encounter.              Passed - Medication is active on med list        Passed - Patient is age 18 or older        lisinopril (PRINIVIL/ZESTRIL) 2.5 MG tablet 90 tablet 0     Sig: Take 1 tablet (2.5 mg) by mouth daily       ACE Inhibitors (Including Combos) Protocol Passed - 1/16/2020  1:56 PM        Passed - Blood pressure under 140/90 in past 12 months     BP Readings from Last 3 Encounters:   01/07/20 127/87   09/06/19 128/82   06/14/19 137/87                 Passed - Recent (12 mo) or future (30 days) visit within the authorizing provider's specialty     Patient has had an office visit with the authorizing provider or a provider within the authorizing providers department within the previous 12 mos or has a future within next 30 days. See \"Patient Info\" tab in inbasket, or \"Choose Columns\" in Meds & Orders section of the refill encounter.              Passed - Medication is active on med list        Passed - Patient is age 18 or older        Passed - Normal serum creatinine on file in past 12 months     Recent Labs   Lab Test 12/19/19  0807   CR 0.79             Passed - Normal serum potassium on file in past 12 months     Recent Labs   Lab Test 12/19/19  0807   POTASSIUM 4.0             metFORMIN (GLUCOPHAGE) 500 MG tablet 90 tablet 0     Sig: TAKE 1 TABLET DAILY WITH DINNER       Biguanide Agents Passed - 1/16/2020  1:56 PM        Passed - Blood pressure less than 140/90 in past 6 months     BP Readings from Last 3 Encounters:   01/07/20 127/87   09/06/19 128/82   06/14/19 137/87                 Passed - Patient has documented LDL within the past 12 mos.     Recent Labs   Lab Test 12/19/19  0807   LDL 68             Passed - Patient has had a Microalbumin in the past 15 mos.     Recent Labs   Lab Test 12/19/19  0813   MICROL <5   UMALCR Unable to calculate due to low value             Passed - Patient is age " "10 or older        Passed - Patient has documented A1c within the specified period of time.     If HgbA1C is 8 or greater, it needs to be on file within the past 3 months.  If less than 8, must be on file within the past 6 months.     Recent Labs   Lab Test 12/19/19  0807   A1C 5.7*             Passed - Patient's CR is NOT>1.4 OR Patient's EGFR is NOT<45 within past 12 mos.     Recent Labs   Lab Test 12/19/19  0807   GFRESTIMATED >90   GFRESTBLACK >90       Recent Labs   Lab Test 12/19/19  0807   CR 0.79             Passed - Patient does NOT have a diagnosis of CHF.        Passed - Medication is active on med list        Passed - Recent (6 mo) or future (30 days) visit within the authorizing provider's specialty     Patient had office visit in the last 6 months or has a visit in the next 30 days with authorizing provider or within the authorizing provider's specialty.  See \"Patient Info\" tab in inbasket, or \"Choose Columns\" in Meds & Orders section of the refill encounter.            triamterene-HCTZ (DYAZIDE) 37.5-25 MG capsule 90 capsule 1     Sig: Take 1 capsule by mouth daily       Diuretics (Including Combos) Protocol Passed - 1/16/2020  1:56 PM        Passed - Blood pressure under 140/90 in past 12 months     BP Readings from Last 3 Encounters:   01/07/20 127/87   09/06/19 128/82   06/14/19 137/87                 Passed - Recent (12 mo) or future (30 days) visit within the authorizing provider's specialty     Patient has had an office visit with the authorizing provider or a provider within the authorizing providers department within the previous 12 mos or has a future within next 30 days. See \"Patient Info\" tab in inbasket, or \"Choose Columns\" in Meds & Orders section of the refill encounter.              Passed - Medication is active on med list        Passed - Patient is age 18 or older        Passed - Normal serum creatinine on file in past 12 months     Recent Labs   Lab Test 12/19/19  0807   CR 0.79    " "          Passed - Normal serum potassium on file in past 12 months     Recent Labs   Lab Test 12/19/19  0807   POTASSIUM 4.0                    Passed - Normal serum sodium on file in past 12 months     Recent Labs   Lab Test 12/19/19  0807               Signed Prescriptions Disp Refills    lisinopril (PRINIVIL/ZESTRIL) 2.5 MG tablet 90 tablet 0     Sig: TAKE 1 TABLET BY MOUTH EVERY DAY       ACE Inhibitors (Including Combos) Protocol Passed - 1/3/2020 11:34 AM        Passed - Blood pressure under 140/90 in past 12 months     BP Readings from Last 3 Encounters:   01/07/20 127/87   09/06/19 128/82   06/14/19 137/87                 Passed - Recent (12 mo) or future (30 days) visit within the authorizing provider's specialty     Patient has had an office visit with the authorizing provider or a provider within the authorizing providers department within the previous 12 mos or has a future within next 30 days. See \"Patient Info\" tab in inbasket, or \"Choose Columns\" in Meds & Orders section of the refill encounter.              Passed - Medication is active on med list        Passed - Patient is age 18 or older        Passed - Normal serum creatinine on file in past 12 months     Recent Labs   Lab Test 12/19/19  0807   CR 0.79             Passed - Normal serum potassium on file in past 12 months     Recent Labs   Lab Test 12/19/19  0807   POTASSIUM 4.0               "

## 2020-01-16 NOTE — TELEPHONE ENCOUNTER
Reason for Call:  Other prescription    Detailed comments: Pt calling to follow up on medication request and would like for Dr. Baldwin to send in Rx to Pharmacy as soon as possible. He would like a call back to confirm Rx has been sent for Pt is out.    Phone Number Patient can be reached at: Home number on file [phone] (home)    Best Time: anytime    Can we leave a detailed message on this number? YES    Call taken on 1/16/2020 at 12:46 PM by Kenneth Weiss

## 2020-01-16 NOTE — TELEPHONE ENCOUNTER
Duplicate refill request; was sent twice today to pharmacy.          Betina Coleman RN, BSN, PHN

## 2020-01-16 NOTE — TELEPHONE ENCOUNTER
Called patient to clarify.  [facility] was using [facility] and his insurance has changed to [facility] mail order.  Patient was told they would transfer his prescriptions.  Informed we normally need to send all new prescriptions to mail order.    Patient will need one month of metformin to [facility] on [address].    ALSO : Please send in all new scripts to [facility] (including metformin)

## 2020-02-24 ENCOUNTER — HEALTH MAINTENANCE LETTER (OUTPATIENT)
Age: 45
End: 2020-02-24

## 2020-04-03 DIAGNOSIS — E11.9 TYPE 2 DIABETES MELLITUS WITHOUT COMPLICATION, WITHOUT LONG-TERM CURRENT USE OF INSULIN (H): ICD-10-CM

## 2020-04-03 NOTE — TELEPHONE ENCOUNTER
"Requested Prescriptions   Pending Prescriptions Disp Refills     metFORMIN (GLUCOPHAGE) 500 MG tablet [Pharmacy Med Name: MetFORMIN 500MG TABLET]  Last Written Prescription Date:  1/16/20  Last Fill Quantity: 90 tablet,  # refills: 0   Last office visit: 1/7/2020 with prescribing provider:  Dr. Baldwin   Future Office Visit:     90 tablet 0     Sig: TAKE 1 TABLET BY MOUTH  DAILY WITH DINNER       Biguanide Agents Passed - 4/3/2020  4:26 AM        Passed - Patient is age 10 or older        Passed - Patient has documented A1c within the specified period of time.     If HgbA1C is 8 or greater, it needs to be on file within the past 3 months.  If less than 8, must be on file within the past 6 months.     Recent Labs   Lab Test 12/19/19  0807   A1C 5.7*             Passed - Patient's CR is NOT>1.4 OR Patient's EGFR is NOT<45 within past 12 mos.     Recent Labs   Lab Test 12/19/19  0807   GFRESTIMATED >90   GFRESTBLACK >90       Recent Labs   Lab Test 12/19/19  0807   CR 0.79             Passed - Patient does NOT have a diagnosis of CHF.        Passed - Medication is active on med list        Passed - Recent (6 mo) or future (30 days) visit within the authorizing provider's specialty     Patient had office visit in the last 6 months or has a visit in the next 30 days with authorizing provider or within the authorizing provider's specialty.  See \"Patient Info\" tab in inbasket, or \"Choose Columns\" in Meds & Orders section of the refill encounter.                 "

## 2020-04-03 NOTE — TELEPHONE ENCOUNTER
Routing refill request to provider for review/approval because:  Duplicate Rxs on file same dose   Charley Meadows RN

## 2020-05-31 DIAGNOSIS — E11.9 TYPE 2 DIABETES MELLITUS WITHOUT COMPLICATION, WITHOUT LONG-TERM CURRENT USE OF INSULIN (H): ICD-10-CM

## 2020-05-31 DIAGNOSIS — E78.5 HYPERLIPIDEMIA LDL GOAL <70: ICD-10-CM

## 2020-05-31 DIAGNOSIS — I10 ESSENTIAL HYPERTENSION WITH GOAL BLOOD PRESSURE LESS THAN 140/90: ICD-10-CM

## 2020-06-03 RX ORDER — AMLODIPINE BESYLATE 5 MG/1
TABLET ORAL
Qty: 90 TABLET | Refills: 1 | Status: SHIPPED | OUTPATIENT
Start: 2020-06-03 | End: 2020-12-03

## 2020-06-03 RX ORDER — ATORVASTATIN CALCIUM 10 MG/1
TABLET, FILM COATED ORAL
Qty: 90 TABLET | Refills: 1 | Status: SHIPPED | OUTPATIENT
Start: 2020-06-03 | End: 2021-01-18

## 2020-06-03 RX ORDER — TRIAMTERENE AND HYDROCHLOROTHIAZIDE 37.5; 25 MG/1; MG/1
1 CAPSULE ORAL DAILY
Qty: 90 CAPSULE | Refills: 1 | Status: SHIPPED | OUTPATIENT
Start: 2020-06-03 | End: 2020-12-03

## 2020-06-03 RX ORDER — LISINOPRIL 2.5 MG/1
TABLET ORAL
Qty: 90 TABLET | Refills: 1 | Status: SHIPPED | OUTPATIENT
Start: 2020-06-03 | End: 2021-01-18

## 2020-06-03 NOTE — TELEPHONE ENCOUNTER
Prescription approved per [facility] Refill Protocol.    Carmen Robbins RN, [facility] Triage

## 2020-09-10 DIAGNOSIS — E11.9 TYPE 2 DIABETES MELLITUS WITHOUT COMPLICATION, WITHOUT LONG-TERM CURRENT USE OF INSULIN (H): ICD-10-CM

## 2020-11-11 DIAGNOSIS — E11.9 TYPE 2 DIABETES MELLITUS WITHOUT COMPLICATION, WITHOUT LONG-TERM CURRENT USE OF INSULIN (H): ICD-10-CM

## 2020-12-13 ENCOUNTER — HEALTH MAINTENANCE LETTER (OUTPATIENT)
Age: 45
End: 2020-12-13

## 2021-01-08 ENCOUNTER — DOCUMENTATION ONLY (OUTPATIENT)
Dept: FAMILY MEDICINE | Facility: CLINIC | Age: 46
End: 2021-01-08

## 2021-01-08 DIAGNOSIS — E11.9 TYPE 2 DIABETES MELLITUS WITHOUT COMPLICATION, WITHOUT LONG-TERM CURRENT USE OF INSULIN (H): Primary | ICD-10-CM

## 2021-01-14 DIAGNOSIS — E11.9 TYPE 2 DIABETES MELLITUS WITHOUT COMPLICATION, WITHOUT LONG-TERM CURRENT USE OF INSULIN (H): ICD-10-CM

## 2021-01-14 LAB
ALBUMIN SERPL-MCNC: 4.2 G/DL (ref 3.4–5)
ALP SERPL-CCNC: 71 U/L (ref 40–150)
ALT SERPL W P-5'-P-CCNC: 57 U/L (ref 0–70)
ANION GAP SERPL CALCULATED.3IONS-SCNC: 3 MMOL/L (ref 3–14)
AST SERPL W P-5'-P-CCNC: 20 U/L (ref 0–45)
BILIRUB SERPL-MCNC: 0.5 MG/DL (ref 0.2–1.3)
BUN SERPL-MCNC: 15 MG/DL (ref 7–30)
CALCIUM SERPL-MCNC: 9.5 MG/DL (ref 8.5–10.1)
CHLORIDE SERPL-SCNC: 106 MMOL/L (ref 94–109)
CHOLEST SERPL-MCNC: 157 MG/DL
CO2 SERPL-SCNC: 30 MMOL/L (ref 20–32)
CREAT SERPL-MCNC: 0.88 MG/DL (ref 0.66–1.25)
CREAT UR-MCNC: 197 MG/DL
GFR SERPL CREATININE-BSD FRML MDRD: >90 ML/MIN/{1.73_M2}
GLUCOSE SERPL-MCNC: 100 MG/DL (ref 70–99)
HBA1C MFR BLD: 5.9 % (ref 0–5.6)
HDLC SERPL-MCNC: 48 MG/DL
LDLC SERPL CALC-MCNC: 80 MG/DL
MICROALBUMIN UR-MCNC: 7 MG/L
MICROALBUMIN/CREAT UR: 3.58 MG/G CR (ref 0–17)
NONHDLC SERPL-MCNC: 109 MG/DL
POTASSIUM SERPL-SCNC: 4.3 MMOL/L (ref 3.4–5.3)
PROT SERPL-MCNC: 7.4 G/DL (ref 6.8–8.8)
SODIUM SERPL-SCNC: 139 MMOL/L (ref 133–144)
TRIGL SERPL-MCNC: 147 MG/DL

## 2021-01-14 PROCEDURE — 80053 COMPREHEN METABOLIC PANEL: CPT | Performed by: FAMILY MEDICINE

## 2021-01-14 PROCEDURE — 82043 UR ALBUMIN QUANTITATIVE: CPT | Performed by: FAMILY MEDICINE

## 2021-01-14 PROCEDURE — 80061 LIPID PANEL: CPT | Performed by: FAMILY MEDICINE

## 2021-01-14 PROCEDURE — 83036 HEMOGLOBIN GLYCOSYLATED A1C: CPT | Performed by: FAMILY MEDICINE

## 2021-01-14 PROCEDURE — 36415 COLL VENOUS BLD VENIPUNCTURE: CPT | Performed by: FAMILY MEDICINE

## 2021-01-18 ENCOUNTER — VIRTUAL VISIT (OUTPATIENT)
Dept: FAMILY MEDICINE | Facility: CLINIC | Age: 46
End: 2021-01-18
Payer: COMMERCIAL

## 2021-01-18 DIAGNOSIS — E11.9 TYPE 2 DIABETES MELLITUS WITHOUT COMPLICATION, WITHOUT LONG-TERM CURRENT USE OF INSULIN (H): Primary | ICD-10-CM

## 2021-01-18 DIAGNOSIS — E78.5 HYPERLIPIDEMIA LDL GOAL <70: ICD-10-CM

## 2021-01-18 DIAGNOSIS — Z12.11 COLON CANCER SCREENING: ICD-10-CM

## 2021-01-18 DIAGNOSIS — I10 ESSENTIAL HYPERTENSION WITH GOAL BLOOD PRESSURE LESS THAN 140/90: ICD-10-CM

## 2021-01-18 PROCEDURE — 99214 OFFICE O/P EST MOD 30 MIN: CPT | Mod: GT | Performed by: FAMILY MEDICINE

## 2021-01-18 RX ORDER — TRIAMTERENE AND HYDROCHLOROTHIAZIDE 37.5; 25 MG/1; MG/1
1 CAPSULE ORAL DAILY
Qty: 90 CAPSULE | Refills: 1 | Status: SHIPPED | OUTPATIENT
Start: 2021-01-18 | End: 2021-04-14

## 2021-01-18 RX ORDER — AMLODIPINE BESYLATE 5 MG/1
5 TABLET ORAL DAILY
Qty: 90 TABLET | Refills: 1 | Status: SHIPPED | OUTPATIENT
Start: 2021-01-18 | End: 2021-04-14

## 2021-01-18 RX ORDER — LISINOPRIL 2.5 MG/1
2.5 TABLET ORAL DAILY
Qty: 90 TABLET | Refills: 1 | Status: SHIPPED | OUTPATIENT
Start: 2021-01-18 | End: 2021-04-14

## 2021-01-18 RX ORDER — ATORVASTATIN CALCIUM 10 MG/1
10 TABLET, FILM COATED ORAL AT BEDTIME
Qty: 90 TABLET | Refills: 1 | Status: SHIPPED | OUTPATIENT
Start: 2021-01-18 | End: 2021-04-14

## 2021-01-18 NOTE — PROGRESS NOTES
Helena is a 45 year old who is being evaluated via a billable video visit.      How would you like to obtain your AVS? MyChart  If the video visit is dropped, the invitation should be resent by: Text to cell phone: [phone]  Will anyone else be joining your video visit? No      Video Start Time: 1300  Assessment & Plan     Type 2 diabetes mellitus without complication, without long-term current use of insulin (H)  Stable on current regimen.  Continue same plan and routine follow-up.   - lisinopril (ZESTRIL) 2.5 MG tablet; Take 1 tablet (2.5 mg) by mouth daily  - metFORMIN (GLUCOPHAGE) 500 MG tablet; Take 1 tablet (500 mg) by mouth daily (with dinner)    Essential hypertension with goal blood pressure less than 140/90  Stable on current regimen.  Continue same plan and routine follow-up.   - amLODIPine (NORVASC) 5 MG tablet; Take 1 tablet (5 mg) by mouth daily  - lisinopril (ZESTRIL) 2.5 MG tablet; Take 1 tablet (2.5 mg) by mouth daily  - triamterene-HCTZ (DYAZIDE) 37.5-25 MG capsule; Take 1 capsule by mouth daily    Hyperlipidemia LDL goal <70  Stable on current regimen.  Continue same plan and routine follow-up.   - atorvastatin (LIPITOR) 10 MG tablet; Take 1 tablet (10 mg) by mouth At Bedtime    See Patient Instructions    Return in about 6 months (around 7/18/2021) for Diabetes follow up, In Office or Video, Previsit labwork.    Dolores Baldwin MD  [facility]       HPI       Diabetes Follow-up      How often are you checking your blood sugar? Not at all    What concerns do you have today about your diabetes? None     Do you have any of these symptoms? (Select all that apply)  No numbness or tingling in feet.  No redness, sores or blisters on feet.  No complaints of excessive thirst.  No reports of blurry vision.  No significant changes to weight.    Have you had a diabetic eye exam in the last 12 months? No        BP Readings from Last 2 Encounters:   01/07/20 127/87    09/06/19 128/82     Hemoglobin A1C (%)   Date Value   01/14/2021 5.9 (H)   12/19/2019 5.7 (H)     LDL Cholesterol Calculated (mg/dL)   Date Value   01/14/2021 80   12/19/2019 68                 How many servings of fruits and vegetables do you eat daily?  2-3    On average, how many sweetened beverages do you drink each day (Examples: soda, juice, sweet tea, etc.  Do NOT count diet or artificially sweetened beverages)?   0    How many days per week do you exercise enough to make your heart beat faster? 3 or less    How many minutes a day do you exercise enough to make your heart beat faster? 9 or less    How many days per week do you miss taking your medication? 0    Met with patient today in follow-up of diabetes, hypertension, lipids  Doing well.  Reports no interval health concerns.    Patient reports no side effects from medications, and desires no change in therapy.     Review of Systems   Constitutional, HEENT, cardiovascular, pulmonary, gi and gu systems are negative, except as otherwise noted.      Objective           Vitals:  No vitals were obtained today due to virtual visit.    Physical Exam   GENERAL: Healthy, alert and no distress  EYES: Eyes grossly normal to inspection.  No discharge or erythema, or obvious scleral/conjunctival abnormalities.  RESP: No audible wheeze, cough, or visible cyanosis.  No visible retractions or increased work of breathing.    SKIN: Visible skin clear. No significant rash, abnormal pigmentation or lesions.  NEURO: Cranial nerves grossly intact.  Mentation and speech appropriate for age.  PSYCH: Mentation appears normal, affect normal/bright, judgement and insight intact, normal speech and appearance well-groomed.    Past labs reviewed with the patient.             Video-Visit Details    Type of service:  Video Visit    Video End Time:1308    Originating Location (pt. Location): Home    Distant Location (provider location):  [facility]  used for Video Visit: Ankur

## 2021-02-01 DIAGNOSIS — E11.9 TYPE 2 DIABETES MELLITUS WITHOUT COMPLICATION, WITHOUT LONG-TERM CURRENT USE OF INSULIN (H): ICD-10-CM

## 2021-03-23 ENCOUNTER — IMMUNIZATION (OUTPATIENT)
Dept: NURSING | Facility: CLINIC | Age: 46
End: 2021-03-23
Payer: COMMERCIAL

## 2021-03-23 PROCEDURE — 91300 PR COVID VAC PFIZER DIL RECON 30 MCG/0.3 ML IM: CPT

## 2021-03-23 PROCEDURE — 0001A PR COVID VAC PFIZER DIL RECON 30 MCG/0.3 ML IM: CPT

## 2021-04-13 ENCOUNTER — IMMUNIZATION (OUTPATIENT)
Dept: NURSING | Facility: CLINIC | Age: 46
End: 2021-04-13
Attending: INTERNAL MEDICINE
Payer: COMMERCIAL

## 2021-04-13 DIAGNOSIS — E11.9 TYPE 2 DIABETES MELLITUS WITHOUT COMPLICATION, WITHOUT LONG-TERM CURRENT USE OF INSULIN (H): ICD-10-CM

## 2021-04-13 DIAGNOSIS — E78.5 HYPERLIPIDEMIA LDL GOAL <70: ICD-10-CM

## 2021-04-13 DIAGNOSIS — I10 ESSENTIAL HYPERTENSION WITH GOAL BLOOD PRESSURE LESS THAN 140/90: ICD-10-CM

## 2021-04-13 PROCEDURE — 91300 PR COVID VAC PFIZER DIL RECON 30 MCG/0.3 ML IM: CPT

## 2021-04-13 PROCEDURE — 0002A PR COVID VAC PFIZER DIL RECON 30 MCG/0.3 ML IM: CPT

## 2021-04-14 RX ORDER — ATORVASTATIN CALCIUM 10 MG/1
TABLET, FILM COATED ORAL
Qty: 90 TABLET | Refills: 0 | Status: SHIPPED | OUTPATIENT
Start: 2021-04-14 | End: 2021-06-11

## 2021-04-14 RX ORDER — LISINOPRIL 2.5 MG/1
TABLET ORAL
Qty: 90 TABLET | Refills: 0 | Status: SHIPPED | OUTPATIENT
Start: 2021-04-14 | End: 2021-06-11

## 2021-04-14 RX ORDER — TRIAMTERENE AND HYDROCHLOROTHIAZIDE 37.5; 25 MG/1; MG/1
1 CAPSULE ORAL DAILY
Qty: 90 CAPSULE | Refills: 0 | Status: SHIPPED | OUTPATIENT
Start: 2021-04-14 | End: 2021-06-11

## 2021-04-14 RX ORDER — AMLODIPINE BESYLATE 5 MG/1
TABLET ORAL
Qty: 90 TABLET | Refills: 0 | Status: SHIPPED | OUTPATIENT
Start: 2021-04-14 | End: 2021-06-11

## 2021-04-14 NOTE — TELEPHONE ENCOUNTER
BP not taken in over 1 year.   RN unable to refill medication.     Routing to provider to advise.  Malathi Moran BSN, RN

## 2021-04-17 ENCOUNTER — HEALTH MAINTENANCE LETTER (OUTPATIENT)
Age: 46
End: 2021-04-17

## 2021-05-07 ENCOUNTER — VIRTUAL VISIT (OUTPATIENT)
Dept: FAMILY MEDICINE | Facility: CLINIC | Age: 46
End: 2021-05-07
Payer: COMMERCIAL

## 2021-05-07 DIAGNOSIS — R59.1 LA (LYMPHADENOPATHY): Primary | ICD-10-CM

## 2021-05-07 PROCEDURE — 99213 OFFICE O/P EST LOW 20 MIN: CPT | Mod: 95 | Performed by: FAMILY MEDICINE

## 2021-05-07 RX ORDER — AMOXICILLIN 875 MG
875 TABLET ORAL 2 TIMES DAILY
Qty: 14 TABLET | Refills: 0 | Status: SHIPPED | OUTPATIENT
Start: 2021-05-07 | End: 2021-05-12

## 2021-05-07 NOTE — PROGRESS NOTES
Helena is a 45 year old who is being evaluated via a billable video visit.      How would you like to obtain your AVS? [employer]ist[employer]art  If the video visit is dropped, the invitation should be resent by: Text to cell phone: 1  Will anyone else be joining your video visit? No      Video Start Time: 0937    Assessment & Plan     LA (lymphadenopathy)  Patient with some persistent right-sided lymphadenopathy that waxes and wanes after a nonspecific upper respiratory infection a couple of weeks ago.  Is fully vaccinated against Covid.  Other members of the family had similar symptoms and recovered.  Covid testing during this time was negative.  So I think this is garden-variety lymphadenopathy and we discussed watchful waiting versus treating.  I think a short course of antibiotics would be reasonable and we expect full resolution.  - amoxicillin (AMOXIL) 875 MG tablet; Take 1 tablet (875 mg) by mouth 2 times daily for 7 days       See Patient Instructions    Return in about 1 week (around 5/14/2021) for If not improving as expected, [employer] message.    Dolores Baldwin MD  [facility]   Helena is a 45 year old who presents for the following health issues     HPI     Video visit with patient today to discuss some swollen glands as above.  Started a couple weeks ago and it seems to come and go.  No fever chills or similar.    Review of Systems   Constitutional, HEENT, cardiovascular, pulmonary, gi and gu systems are negative, except as otherwise noted.      Objective           Vitals:  No vitals were obtained today due to virtual visit.    Physical Exam   GENERAL: Healthy, alert and no distress  EYES: Eyes grossly normal to inspection.  No discharge or erythema, or obvious scleral/conjunctival abnormalities.  RESP: No audible wheeze, cough, or visible cyanosis.  No visible retractions or increased work of breathing.    SKIN: Visible skin clear. No significant rash, abnormal pigmentation or  lesions.  NEURO: Cranial nerves grossly intact.  Mentation and speech appropriate for age.  PSYCH: Mentation appears normal, affect normal/bright, judgement and insight intact, normal speech and appearance well-groomed.    Past labs reviewed with the patient.             Video-Visit Details    Type of service:  Video Visit    Video End Time:0943    Originating Location (pt. Location): Home    Distant Location (provider location):  [facility]     Platform used for Video Visit: [employer]

## 2021-05-12 ENCOUNTER — MYC MEDICAL ADVICE (OUTPATIENT)
Dept: FAMILY MEDICINE | Facility: CLINIC | Age: 46
End: 2021-05-12

## 2021-05-12 DIAGNOSIS — R59.1 LA (LYMPHADENOPATHY): ICD-10-CM

## 2021-05-12 RX ORDER — CLINDAMYCIN HCL 300 MG
300 CAPSULE ORAL 3 TIMES DAILY
Qty: 21 CAPSULE | Refills: 0 | Status: SHIPPED | OUTPATIENT
Start: 2021-05-12 | End: 2021-05-20

## 2021-05-12 NOTE — TELEPHONE ENCOUNTER
05/07/21 virtual visit with Dr. Baldwin  Instructions       Return in about 1 week (around 5/14/2021) for If not improving as expected, [employer] message.

## 2021-05-17 ENCOUNTER — MYC MEDICAL ADVICE (OUTPATIENT)
Dept: FAMILY MEDICINE | Facility: CLINIC | Age: 46
End: 2021-05-17

## 2021-07-07 DIAGNOSIS — E11.9 TYPE 2 DIABETES MELLITUS WITHOUT COMPLICATION, WITHOUT LONG-TERM CURRENT USE OF INSULIN (H): ICD-10-CM

## 2021-08-01 ENCOUNTER — HEALTH MAINTENANCE LETTER (OUTPATIENT)
Age: 46
End: 2021-08-01

## 2021-09-04 DIAGNOSIS — E11.9 TYPE 2 DIABETES MELLITUS WITHOUT COMPLICATION, WITHOUT LONG-TERM CURRENT USE OF INSULIN (H): ICD-10-CM

## 2021-09-07 NOTE — TELEPHONE ENCOUNTER
"Routing refill request to provider for review/approval because:  Requested Prescriptions   Pending Prescriptions Disp Refills    metFORMIN (GLUCOPHAGE) 500 MG tablet [Pharmacy Med Name: metFORMIN HCl 500 MG Oral Tablet] 90 tablet 0     Sig: TAKE 1 TABLET BY MOUTH  DAILY WITH DINNER        Biguanide Agents Failed - 9/4/2021  3:59 AM        Failed - Patient has documented A1c within the specified period of time.     If HgbA1C is 8 or greater, it needs to be on file within the past 3 months.  If less than 8, must be on file within the past 6 months.     Recent Labs   Lab Test 01/14/21  0959   A1C 5.9*             Passed - Patient is age 10 or older        Passed - Patient's CR is NOT>1.4 OR Patient's EGFR is NOT<45 within past 12 mos.       Recent Labs   Lab Test 01/14/21  0959   GFRESTIMATED >90   GFRESTBLACK >90       Recent Labs   Lab Test 01/14/21  0959   CR 0.88             Passed - Patient does NOT have a diagnosis of CHF.        Passed - Medication is active on med list        Passed - Recent (6 mo) or future (30 days) visit within the authorizing provider's specialty     Patient had office visit in the last 6 months or has a visit in the next 30 days with authorizing provider or within the authorizing provider's specialty.  See \"Patient Info\" tab in inbasket, or \"Choose Columns\" in Meds & Orders section of the refill encounter.                    Carmen CASTILLO, RN        "

## 2021-09-12 ENCOUNTER — OFFICE VISIT (OUTPATIENT)
Dept: URGENT CARE | Facility: URGENT CARE | Age: 46
End: 2021-09-12
Payer: COMMERCIAL

## 2021-09-12 VITALS
SYSTOLIC BLOOD PRESSURE: 168 MMHG | HEART RATE: 116 BPM | BODY MASS INDEX: 28.02 KG/M2 | RESPIRATION RATE: 20 BRPM | WEIGHT: 168.38 LBS | DIASTOLIC BLOOD PRESSURE: 101 MMHG

## 2021-09-12 DIAGNOSIS — M54.2 NECK PAIN ON LEFT SIDE: Primary | ICD-10-CM

## 2021-09-12 DIAGNOSIS — I10 ELEVATED BLOOD PRESSURE READING IN OFFICE WITH DIAGNOSIS OF HYPERTENSION: ICD-10-CM

## 2021-09-12 PROCEDURE — 99214 OFFICE O/P EST MOD 30 MIN: CPT | Performed by: PHYSICIAN ASSISTANT

## 2021-09-12 RX ORDER — METHOCARBAMOL 500 MG/1
500 TABLET, FILM COATED ORAL 3 TIMES DAILY
Qty: 30 TABLET | Refills: 0 | Status: SHIPPED | OUTPATIENT
Start: 2021-09-12 | End: 2021-09-22

## 2021-09-12 RX ORDER — HYDROCODONE BITARTRATE AND ACETAMINOPHEN 5; 325 MG/1; MG/1
1 TABLET ORAL EVERY 6 HOURS PRN
Qty: 8 TABLET | Refills: 0 | Status: SHIPPED | OUTPATIENT
Start: 2021-09-12 | End: 2021-09-15

## 2021-09-12 RX ORDER — METHYLPREDNISOLONE 4 MG
TABLET, DOSE PACK ORAL
Qty: 21 TABLET | Refills: 0 | Status: SHIPPED | OUTPATIENT
Start: 2021-09-12 | End: 2022-01-18

## 2021-09-12 NOTE — PROGRESS NOTES
Chief Complaint   Patient presents with     Shoulder Pain     left shoulder pain started yesterday, pt states usually sleep with a night gear and didn't used that night. pt tried Ibuprofen which was ineffective toward the pain.             Medical Decision Making:    Differential Diagnosis:  MS Neck Pain: sprain, muscle strain, contusion and osteoarthritis, neuro impingement, disc herniation, shoulder issue          ASSESSMENT:    ICD-10-CM    1. Neck pain on left side  M54.2 methylPREDNISolone (MEDROL DOSEPAK) 4 MG tablet therapy pack     methocarbamol (ROBAXIN) 500 MG tablet     HYDROcodone-acetaminophen (NORCO) 5-325 MG tablet     Physical Therapy Referral   2. Elevated blood pressure reading in office with diagnosis of hypertension  I10              PLAN: Vital signs stable other than elevated blood pressure.  Recheck outside of clinic and follow-up with primary as indicated.  I doubt this is coming from his shoulder.  Likely left cervical strain/sprain.  Neck has moderate restriction with range of motion.  Likely cervical strain/sprain.  Cannot rule out underlying cervical neural impingement.  No injury.  Will treat with Medrol Dosepak and muscle relaxant.  Also given Norco for breakthrough pain only.  May cause drowsiness.  Order for physical therapy-traction can work well for neck issues.  Did not start therapy unless the pain has settled down.  Follow-up with primary if any concerns.  Advised about symptoms which might herald more serious problems.            Courtney Tesfaye PA-C      SUBJECTIVE:   Fred Núñez is an 46 year old male who presents with left-sided neck pain/shoulder pain since yesterday.  Ibuprofen and Tylenol ineffective.  Prior history of pinched nerve in his neck.  He denies any upper extremity radicular pain, numbness, tingling, weakness.  No fever.  No headache.  Has been doing more golfing lately.  Occasionally gets some tingling in the fourth and fifth digits but this seems to  be after he has slept with pressure on the left elbow.  No chest pain or shortness of breath.  History of hypertension.  His blood pressure is elevated here today.      Allergies   Allergen Reactions     Cats      Dogs      Dust Mites        Past Medical History:   Diagnosis Date     CARDIOVASCULAR SCREENING; LDL GOAL LESS THAN 160      Migraine headache        amLODIPine (NORVASC) 5 MG tablet, TAKE 1 TABLET BY MOUTH  DAILY  atorvastatin (LIPITOR) 10 MG tablet, TAKE 1 TABLET BY MOUTH AT  BEDTIME  blood glucose monitoring (ACCU-CHEK SMARTVIEW) test strip, Use to test blood sugar 2 times daily or as directed.  blood glucose monitoring (ONE TOUCH ULTRA 2) meter device kit, Use to test blood sugars 2 times daily or as directed.  clindamycin (CLEOCIN) 300 MG capsule, Take 1 capsule (300 mg) by mouth 3 times daily  cyclobenzaprine (FLEXERIL) 5 MG tablet, Take 1 tablet (5 mg) by mouth 3 times daily as needed for muscle spasms  lisinopril (ZESTRIL) 2.5 MG tablet, TAKE 1 TABLET BY MOUTH  DAILY  Magnesium-Potassium 250-100 MG TABS,   metFORMIN (GLUCOPHAGE) 500 MG tablet, Take 1 tablet (500 mg) by mouth daily (with dinner) Needs follow up visit for any further refill.  multivitamin, therapeutic with minerals (MULTI-VITAMIN) TABS, Take 1 tablet by mouth daily  OMEPRAZOLE PO, Take 20 mg by mouth daily  order for DME, Equipment being ordered: Blood pressure monitor.  Check blood pressure every morning.  Goal blood pressure: systolic less than 125; diastolic blood pressure less than 75.  polyethylene glycol (MIRALAX/GLYCOLAX) powder, MIX 17 GRAM (1 CAPFUL) IN 4 TO 8 OUNCES OF LIQUID AND DRINK BY MOUTH DAILY  triamterene-HCTZ (DYAZIDE) 37.5-25 MG capsule, TAKE 1 CAPSULE BY MOUTH  DAILY    No current facility-administered medications on file prior to visit.      Social History     Tobacco Use     Smoking status: Former Smoker     Types: Cigarettes     Smokeless tobacco: Never Used   Substance Use Topics     Alcohol use: Yes      Comment: 3 to 4 drinks - 4 to 7 days a week     Drug use: No       ROS:  Gen: no fevers  Musculoskel: + as above  Skin: as above    OBJECTIVE:  BP (!) 168/101 (BP Location: Left arm, Patient Position: Sitting, Cuff Size: Adult Regular)   Pulse 116   Resp 20   Wt 76.4 kg (168 lb 6 oz)   BMI 28.02 kg/m     General:   awake, alert, and cooperative.  NAD.   Head: Normocephalic, atraumatic.  Eyes: Conjunctiva clear,   MS: Left shoulder is nontender to palpation with full range of motion in all planes.   cap refill intact, radial pulse intact.  Negative impingement sign.  Neck-mild left paraspinous muscle tightness.  Mild posterior sagittal groove tenderness.  Neck with decreased range of motion with extension, left side bending and left lateral rotation, moderate.  Neuro: Alert and oriented - normal speech.  Heart regular rate and rhythm without murmur.  Lungs clear to auscultation.    Courtney Tesfaye PA-C

## 2021-09-23 ENCOUNTER — THERAPY VISIT (OUTPATIENT)
Dept: PHYSICAL THERAPY | Facility: CLINIC | Age: 46
End: 2021-09-23
Attending: PHYSICIAN ASSISTANT
Payer: COMMERCIAL

## 2021-09-23 DIAGNOSIS — M54.2 NECK PAIN ON LEFT SIDE: ICD-10-CM

## 2021-09-23 PROCEDURE — 97161 PT EVAL LOW COMPLEX 20 MIN: CPT | Mod: GP | Performed by: PHYSICAL THERAPIST

## 2021-09-23 PROCEDURE — 97110 THERAPEUTIC EXERCISES: CPT | Mod: GP | Performed by: PHYSICAL THERAPIST

## 2021-09-23 PROCEDURE — 97014 ELECTRIC STIMULATION THERAPY: CPT | Mod: GP | Performed by: PHYSICAL THERAPIST

## 2021-09-23 NOTE — PROGRESS NOTES
Physical Therapy Initial Evaluation  Subjective:  The history is provided by the patient. No  was used.   Patient Health History  Fred Núñez being seen for Neck pain.     Problem began: 8/28/2021.   Problem occurred: Increased acvtivity   Pain is reported as 2/10 on pain scale.  General health as reported by patient is good.  Pertinent medical history includes: diabetes.   Red flags:  None as reported by patient.  Medical allergies: none.   Surgeries include:  None.    Current medications:  High blood pressure medication.    Current occupation is .                     Therapist Generated HPI Evaluation  Problem details: Has had a previous neck issues but it was well under controlled and then had increased activity.  Woke up with a knot in his neck which caused limited ROM and a lot of pain specifically with rotation to the left.  Followed up with UC and was given a 10 day course of muscle relaxants which was helpful (70%) .         Type of problem:  Cervical spine.    This is a recurrent condition.  Condition occurred with:  Insidious onset.  Where condition occurred: at home.  Patient reports pain:  Cervical left side.  Pain is described as shooting and aching and is constant.  Pain radiates to:  Shoulder left and other (Left shoulder blade). Pain is the same all the time.  Since onset symptoms are gradually improving.  Symptoms are exacerbated by rotating head and looking up or down  and relieved by rest.      Restrictions due to condition include:  Working in normal job without restrictions.  Barriers include:  None as reported by patient.                        Objective:  System    Physical Exam    [facility] Cervical Evaluation      Movement Loss:    Flexion (Flex): nil  Retraction (RET): mod and min  Extension (EXT): min  Lateral Flexion Right (LF R): nil  Lateral Flexion Left (LF L): min  Rotation Right (ROT R): nil  Rotation Left (ROT L): min                                                  ROS    Assessment/Plan:    Patient is a 46 year old male with cervical complaints.    Patient has the following significant findings with corresponding treatment plan.                Diagnosis 1:  Neck  Pain -  electric stimulation, manual therapy, self management, education and directional preference exercise  Decreased ROM/flexibility - manual therapy and therapeutic exercise  Impaired muscle performance - neuro re-education  Decreased function - therapeutic activities        Previous and current functional limitations:  (See Goal Flow Sheet for this information)    Short term and Long term goals: (See Goal Flow Sheet for this information)     Communication ability:  Patient appears to be able to clearly communicate and understand verbal and written communication and follow directions correctly.  Treatment Explanation - The following has been discussed with the patient:   RX ordered/plan of care  Anticipated outcomes  Possible risks and side effects  This patient would benefit from PT intervention to resume normal activities.   Rehab potential is good.    Frequency:  1 X week, once daily  Duration:  for 6 weeks  Discharge Plan:  Achieve all LTG.  Independent in home treatment program.  Reach maximal therapeutic benefit.    Please refer to the daily flowsheet for treatment today, total treatment time and time spent performing 1:1 timed codes.

## 2021-09-26 ENCOUNTER — HEALTH MAINTENANCE LETTER (OUTPATIENT)
Age: 46
End: 2021-09-26

## 2021-09-29 ENCOUNTER — DOCUMENTATION ONLY (OUTPATIENT)
Dept: LAB | Facility: CLINIC | Age: 46
End: 2021-09-29

## 2021-09-29 ENCOUNTER — IMMUNIZATION (OUTPATIENT)
Dept: FAMILY MEDICINE | Facility: CLINIC | Age: 46
End: 2021-09-29
Payer: COMMERCIAL

## 2021-09-29 DIAGNOSIS — E11.9 TYPE 2 DIABETES MELLITUS WITHOUT COMPLICATION, WITHOUT LONG-TERM CURRENT USE OF INSULIN (H): Primary | ICD-10-CM

## 2021-09-29 DIAGNOSIS — Z23 NEED FOR PROPHYLACTIC VACCINATION AND INOCULATION AGAINST INFLUENZA: Primary | ICD-10-CM

## 2021-09-29 PROCEDURE — 90686 IIV4 VACC NO PRSV 0.5 ML IM: CPT

## 2021-09-29 PROCEDURE — 90471 IMMUNIZATION ADMIN: CPT

## 2021-09-29 PROCEDURE — 99207 PR NO CHARGE NURSE ONLY: CPT

## 2021-09-29 NOTE — NURSING NOTE
Prior to immunization administration, verified patients identity using patient s name and date of birth. Please see Immunization Activity for additional information.     Screening Questionnaire for Adult Immunization    Are you sick today?   No   Do you have allergies to medications, food, a vaccine component or latex?   No   Have you ever had a serious reaction after receiving a vaccination?   No   Do you have a long-term health problem with heart, lung, kidney, or metabolic disease (e.g., diabetes), asthma, a blood disorder, no spleen, complement component deficiency, a cochlear implant, or a spinal fluid leak?  Are you on long-term aspirin therapy?   No   Do you have cancer, leukemia, HIV/AIDS, or any other immune system problem?   No   Do you have a parent, brother, or sister with an immune system problem?   No   In the past 3 months, have you taken medications that affect  your immune system, such as prednisone, other steroids, or anticancer drugs; drugs for the treatment of rheumatoid arthritis, Crohn s disease, or psoriasis; or have you had radiation treatments?   No   Have you had a seizure, or a brain or other nervous system problem?   No   During the past year, have you received a transfusion of blood or blood    products, or been given immune (gamma) globulin or antiviral drug?   No   For women: Are you pregnant or is there a chance you could become       pregnant during the next month?   No   Have you received any vaccinations in the past 4 weeks?   No     Immunization questionnaire answers were all negative.        Per orders of Dr. Baldwin , injection of Fluzone  given by Gris Rojas. Patient instructed to remain in clinic for 15 minutes afterwards, and to report any adverse reaction to me immediately.       Screening performed by Gris Rojas on 9/29/2021 at 2:01 PM.

## 2021-10-05 ENCOUNTER — LAB (OUTPATIENT)
Dept: LAB | Facility: CLINIC | Age: 46
End: 2021-10-05
Payer: COMMERCIAL

## 2021-10-05 DIAGNOSIS — E11.9 TYPE 2 DIABETES MELLITUS WITHOUT COMPLICATION, WITHOUT LONG-TERM CURRENT USE OF INSULIN (H): ICD-10-CM

## 2021-10-05 LAB
ANION GAP SERPL CALCULATED.3IONS-SCNC: 5 MMOL/L (ref 3–14)
BUN SERPL-MCNC: 14 MG/DL (ref 7–30)
CALCIUM SERPL-MCNC: 9.4 MG/DL (ref 8.5–10.1)
CHLORIDE BLD-SCNC: 104 MMOL/L (ref 94–109)
CO2 SERPL-SCNC: 29 MMOL/L (ref 20–32)
CREAT SERPL-MCNC: 0.92 MG/DL (ref 0.66–1.25)
GFR SERPL CREATININE-BSD FRML MDRD: >90 ML/MIN/1.73M2
GLUCOSE BLD-MCNC: 103 MG/DL (ref 70–99)
HBA1C MFR BLD: 6.3 % (ref 0–5.6)
POTASSIUM BLD-SCNC: 4.2 MMOL/L (ref 3.4–5.3)
SODIUM SERPL-SCNC: 138 MMOL/L (ref 133–144)

## 2021-10-05 PROCEDURE — 80048 BASIC METABOLIC PNL TOTAL CA: CPT

## 2021-10-05 PROCEDURE — 36415 COLL VENOUS BLD VENIPUNCTURE: CPT

## 2021-10-05 PROCEDURE — 83036 HEMOGLOBIN GLYCOSYLATED A1C: CPT

## 2021-10-08 ENCOUNTER — VIRTUAL VISIT (OUTPATIENT)
Dept: FAMILY MEDICINE | Facility: CLINIC | Age: 46
End: 2021-10-08
Payer: COMMERCIAL

## 2021-10-08 DIAGNOSIS — I10 ESSENTIAL HYPERTENSION WITH GOAL BLOOD PRESSURE LESS THAN 140/90: ICD-10-CM

## 2021-10-08 DIAGNOSIS — E78.5 HYPERLIPIDEMIA LDL GOAL <70: ICD-10-CM

## 2021-10-08 DIAGNOSIS — E11.9 TYPE 2 DIABETES MELLITUS WITHOUT COMPLICATION, WITHOUT LONG-TERM CURRENT USE OF INSULIN (H): Primary | ICD-10-CM

## 2021-10-08 DIAGNOSIS — Z12.11 COLON CANCER SCREENING: ICD-10-CM

## 2021-10-08 PROCEDURE — 99214 OFFICE O/P EST MOD 30 MIN: CPT | Mod: GT | Performed by: FAMILY MEDICINE

## 2021-10-08 NOTE — PROGRESS NOTES
Helena is a 46 year old who is being evaluated via a billable video visit.      How would you like to obtain your AVS? MyChart  If the video visit is dropped, the invitation should be resent by: Text to cell phone: 1  Will anyone else be joining your video visit? No      Video Start Time: 1038    Assessment & Plan     Type 2 diabetes mellitus without complication, without long-term current use of insulin (H)  Stable on current regimen.  Continue same plan and routine follow-up.     Essential hypertension with goal blood pressure less than 140/90  Stable on current regimen.  Continue same plan and routine follow-up.     Hyperlipidemia LDL goal <70  Stable on current regimen.  Continue same plan and routine follow-up.     Colon cancer screening    - Adult Gastro Ref - Procedure Only; Future       See Patient Instructions    Return in about 6 months (around 4/8/2022) for Diabetes follow up, In Office or Video, Previsit labwork.    Dolores Baldwin MD  [facility]   Helena is a 46 year old who presents for the following health issues     HPI   Video visit with patient today in follow-up of diabetes, hypertension, lipids.  Reviewed interval history and recent lab work.  Doing well.  Reports no interval health concerns.      Review of Systems   Constitutional, HEENT, cardiovascular, pulmonary, gi and gu systems are negative, except as otherwise noted.      Objective           Vitals:  No vitals were obtained today due to virtual visit.    Physical Exam   GENERAL: Healthy, alert and no distress  EYES: Eyes grossly normal to inspection.  No discharge or erythema, or obvious scleral/conjunctival abnormalities.  RESP: No audible wheeze, cough, or visible cyanosis.  No visible retractions or increased work of breathing.    SKIN: Visible skin clear. No significant rash, abnormal pigmentation or lesions.  NEURO: Cranial nerves grossly intact.  Mentation and speech appropriate for age.  PSYCH:  Mentation appears normal, affect normal/bright, judgement and insight intact, normal speech and appearance well-groomed.    Past labs reviewed with the patient.             Video-Visit Details    Type of service:  Video Visit    Video End Time:1046    Originating Location (pt. Location): Home    Distant Location (provider location):  [facility]     Platform used for Video Visit: [employer]   Answers for HPI/ROS submitted by the patient on 10/8/2021  Frequency of checking blood sugars:: not at all  Diabetic concerns:: none  Paraesthesia present:: none of these symptoms  Have you had a diabetic eye exam within the last year?: No  How many servings of fruits and vegetables do you eat daily?: 0-1  On average, how many sweetened beverages do you drink each day (Examples: soda, juice, sweet tea, etc.  Do NOT count diet or artificially sweetened beverages)?: 0  How many minutes a day do you exercise enough to make your heart beat faster?: 10 to 19  How many days a week do you exercise enough to make your heart beat faster?: 3 or less  How many days per week do you miss taking your medication?: 0

## 2021-10-25 ENCOUNTER — MYC MEDICAL ADVICE (OUTPATIENT)
Dept: FAMILY MEDICINE | Facility: CLINIC | Age: 46
End: 2021-10-25

## 2021-10-25 DIAGNOSIS — Z12.11 COLON CANCER SCREENING: ICD-10-CM

## 2021-10-27 NOTE — TELEPHONE ENCOUNTER
Sent Lastline message. The order for colonoscopy was never released which is why pt was never contacted. I released the order and gave him their number to contact them for appt

## 2021-11-16 ENCOUNTER — IMMUNIZATION (OUTPATIENT)
Dept: NURSING | Facility: CLINIC | Age: 46
End: 2021-11-16
Payer: COMMERCIAL

## 2021-11-16 PROCEDURE — 0004A PR COVID VAC PFIZER DIL RECON 30 MCG/0.3 ML IM: CPT

## 2021-11-16 PROCEDURE — 91300 PR COVID VAC PFIZER DIL RECON 30 MCG/0.3 ML IM: CPT

## 2021-11-19 DIAGNOSIS — E11.9 TYPE 2 DIABETES MELLITUS WITHOUT COMPLICATION, WITHOUT LONG-TERM CURRENT USE OF INSULIN (H): ICD-10-CM

## 2021-11-19 DIAGNOSIS — E78.5 HYPERLIPIDEMIA LDL GOAL <70: ICD-10-CM

## 2021-11-19 DIAGNOSIS — I10 ESSENTIAL HYPERTENSION WITH GOAL BLOOD PRESSURE LESS THAN 140/90: ICD-10-CM

## 2021-11-19 RX ORDER — ATORVASTATIN CALCIUM 10 MG/1
TABLET, FILM COATED ORAL
Qty: 90 TABLET | Refills: 0 | Status: SHIPPED | OUTPATIENT
Start: 2021-11-19 | End: 2022-04-07

## 2021-11-19 RX ORDER — LISINOPRIL 2.5 MG/1
TABLET ORAL
Qty: 90 TABLET | Refills: 1 | Status: SHIPPED | OUTPATIENT
Start: 2021-11-19 | End: 2022-04-07

## 2021-11-19 RX ORDER — AMLODIPINE BESYLATE 5 MG/1
TABLET ORAL
Qty: 90 TABLET | Refills: 1 | Status: SHIPPED | OUTPATIENT
Start: 2021-11-19 | End: 2022-04-07

## 2021-11-19 RX ORDER — TRIAMTERENE AND HYDROCHLOROTHIAZIDE 37.5; 25 MG/1; MG/1
1 CAPSULE ORAL DAILY
Qty: 90 CAPSULE | Refills: 1 | Status: SHIPPED | OUTPATIENT
Start: 2021-11-19 | End: 2022-04-07

## 2021-11-19 NOTE — TELEPHONE ENCOUNTER
"Prescription approved per [facility] Refill Protocol, atorvastatin 10 mg tab.    The following medications did not pass the [facility] protocol.       amLODIPine (NORVASC) 5 MG tablet [Pharmacy Med Name: amLODIPine Besylate 5 MG Oral Tablet] 90 tablet 0     Sig: TAKE 1 TABLET BY MOUTH  DAILY       Calcium Channel Blockers Protocol  Failed - 11/19/2021  6:48 AM        Failed - Blood pressure under 140/90 in past 12 months     BP Readings from Last 3 Encounters:   09/12/21 (!) 168/101   01/07/20 127/87   09/06/19 128/82                 Passed - Recent (12 mo) or future (30 days) visit within the authorizing provider's specialty     Patient has had an office visit with the authorizing provider or a provider within the authorizing providers department within the previous 12 mos or has a future within next 30 days. See \"Patient Info\" tab in inbasket, or \"Choose Columns\" in Meds & Orders section of the refill encounter.              Passed - Medication is active on med list        Passed - Patient is age 18 or older        Passed - Normal serum creatinine on file in past 12 months     Recent Labs   Lab Test 10/05/21  1033   CR 0.92       Ok to refill medication if creatinine is low             lisinopril (ZESTRIL) 2.5 MG tablet [Pharmacy Med Name: Lisinopril 2.5 MG Oral Tablet] 90 tablet 0     Sig: TAKE 1 TABLET BY MOUTH  DAILY       ACE Inhibitors (Including Combos) Protocol Failed - 11/19/2021  6:48 AM        Failed - Blood pressure under 140/90 in past 12 months     BP Readings from Last 3 Encounters:   09/12/21 (!) 168/101   01/07/20 127/87   09/06/19 128/82                 Passed - Recent (12 mo) or future (30 days) visit within the authorizing provider's specialty     Patient has had an office visit with the authorizing provider or a provider within the authorizing providers department within the previous 12 mos or has a future within next 30 days. See \"Patient Info\" tab in inbasket, or \"Choose Columns\" in Meds & Orders section " "of the refill encounter.              Passed - Medication is active on med list        Passed - Patient is age 18 or older        Passed - Normal serum creatinine on file in past 12 months     Recent Labs   Lab Test 10/05/21  1033   CR 0.92       Ok to refill medication if creatinine is low          Passed - Normal serum potassium on file in past 12 months     Recent Labs   Lab Test 10/05/21  1033   POTASSIUM 4.2                triamterene-HCTZ (DYAZIDE) 37.5-25 MG capsule [Pharmacy Med Name: Triamterene-HCTZ 37.5-25 MG Oral Capsule] 90 capsule 0     Sig: TAKE 1 CAPSULE BY MOUTH  DAILY       Diuretics (Including Combos) Protocol Failed - 11/19/2021  6:48 AM        Failed - Blood pressure under 140/90 in past 12 months     BP Readings from Last 3 Encounters:   09/12/21 (!) 168/101   01/07/20 127/87   09/06/19 128/82                 Passed - Recent (12 mo) or future (30 days) visit within the authorizing provider's specialty     Patient has had an office visit with the authorizing provider or a provider within the authorizing providers department within the previous 12 mos or has a future within next 30 days. See \"Patient Info\" tab in inbasket, or \"Choose Columns\" in Meds & Orders section of the refill encounter.              Passed - Medication is active on med list        Passed - Patient is age 18 or older        Passed - Normal serum creatinine on file in past 12 months     Recent Labs   Lab Test 10/05/21  1033   CR 0.92              Passed - Normal serum potassium on file in past 12 months     Recent Labs   Lab Test 10/05/21  1033   POTASSIUM 4.2                    Passed - Normal serum sodium on file in past 12 months     Recent Labs   Lab Test 10/05/21  1033                    Sade Quezada RN, BSN    "

## 2021-12-02 ENCOUNTER — TRANSFERRED RECORDS (OUTPATIENT)
Dept: HEALTH INFORMATION MANAGEMENT | Facility: CLINIC | Age: 46
End: 2021-12-02
Payer: COMMERCIAL

## 2021-12-02 LAB — RETINOPATHY: NEGATIVE

## 2021-12-09 ENCOUNTER — TELEPHONE (OUTPATIENT)
Dept: GASTROENTEROLOGY | Facility: CLINIC | Age: 46
End: 2021-12-09
Payer: COMMERCIAL

## 2021-12-09 DIAGNOSIS — E11.9 TYPE 2 DIABETES MELLITUS WITHOUT COMPLICATION, WITHOUT LONG-TERM CURRENT USE OF INSULIN (H): ICD-10-CM

## 2021-12-09 NOTE — TELEPHONE ENCOUNTER
Screening Questions  1. Are you active on mychart? Y    2. What insurance is in the chart? [employer]    2.  Ordering/Referring Provider: Dolores Baldwin    3. BMI 28.1  , If greater than 40 review exclusion criteria    4.  Respiratory Screening (If yes to any of the following Hospital setting only):     Do you use daily home oxygen? N  Do you have mod to severe Obstructive Sleep Apnea? N   Do you have Pulmonary Hypertension? Y on meds   Do you have UNCONTROLLED asthma? N    5. Have you had a heart or lung transplant (If yes, please review exclusion criteria) ? N    6. Are you currently on dialysis or have chronic kidney disease? N    7. Have you had a stroke or Transient ischemic attack (TIA) within 6 months? N    8. In the past 6 months, have you had any heart related issues including cardiomyopathy or heart attack? N                 If yes, did it require cardiac stenting or other implantable device?N      9. Do you have any implantable devices in your body (pacemaker, defib, LVAD)? N    10. Do you take nitroglycerin? If yes, how often? N    11. Are you currently taking any blood thinners?N    12. Are you a diabetic? Y    13. (Females) Are you currently pregnant?   If yes, how many weeks?      15. Are you taking any prescription pain medications on a routine schedule? N If yes, MAC sedation.    16. Do you have any chemical dependencies such as alcohol, street drugs, or methadone? N If yes, MAC sedation.    17. Do you have any history of post-traumatic stress syndrome, severe anxiety or history of psychosis? N    18. Do you transfer independently? Y    19.  Do you have any issues with constipation? N    20. Preferred Pharmacy for Pre Prescription [id number]    Scheduling Details    Which Colonoscopy Prep was Sent?: Golytely  Procedure Scheduled: Colon  Surgeon: Jitendra  Date of Procedure: 1-18  Location:   Caller (Please ask for phone number if not scheduled by patient):  Pat      Sedation Type: CS  Conscious Sedation- Needs  for 6 hours after the procedure  MAC/General-Needs  for 24 hours after procedure    Pre-op Required at [facility], [place] and OR for MAC sedation:   (if yes advise patient they will need a pre-op prior to procedure)      Is patient on blood thinners? -N (If yes- inform patient to follow up with PCP or provider for follow up instructions)     Informed patient they will need an adult  Y  Cannot take any type of public or medical transportation alone    Pre-Procedure Covid test to be completed at [facility] or Externally: Y MG    Confirmed Nurse will call to complete assessment Y    Additional comments:

## 2021-12-21 DIAGNOSIS — Z11.59 ENCOUNTER FOR SCREENING FOR OTHER VIRAL DISEASES: ICD-10-CM

## 2022-01-11 RX ORDER — BISACODYL 5 MG
20 TABLET, DELAYED RELEASE (ENTERIC COATED) ORAL SEE ADMIN INSTRUCTIONS
Qty: 4 TABLET | Refills: 0 | Status: SHIPPED | OUTPATIENT
Start: 2022-01-11 | End: 2022-07-01

## 2022-01-18 ENCOUNTER — HOSPITAL ENCOUNTER (OUTPATIENT)
Facility: AMBULATORY SURGERY CENTER | Age: 47
Discharge: HOME | End: 2022-01-18
Attending: SURGERY | Admitting: SURGERY
Payer: COMMERCIAL

## 2022-01-18 VITALS
DIASTOLIC BLOOD PRESSURE: 84 MMHG | SYSTOLIC BLOOD PRESSURE: 112 MMHG | HEART RATE: 93 BPM | RESPIRATION RATE: 16 BRPM | TEMPERATURE: 97.3 F | OXYGEN SATURATION: 94 %

## 2022-01-18 DIAGNOSIS — Z12.11 SCREEN FOR COLON CANCER: Primary | ICD-10-CM

## 2022-01-18 LAB
COLONOSCOPY: NORMAL
GLUCOSE BLDC GLUCOMTR-MCNC: 131 MG/DL (ref 70–99)
GLUCOSE BLDC GLUCOMTR-MCNC: 135 MG/DL (ref 70–99)

## 2022-01-18 PROCEDURE — 99152 MOD SED SAME PHYS/QHP 5/>YRS: CPT | Mod: 59 | Performed by: SURGERY

## 2022-01-18 PROCEDURE — 82962 GLUCOSE BLOOD TEST: CPT | Mod: 59 | Performed by: SURGERY

## 2022-01-18 PROCEDURE — G8907 PT DOC NO EVENTS ON DISCHARG: HCPCS

## 2022-01-18 PROCEDURE — G0121 COLON CA SCRN NOT HI RSK IND: HCPCS | Performed by: SURGERY

## 2022-01-18 PROCEDURE — G8918 PT W/O PREOP ORDER IV AB PRO: HCPCS

## 2022-01-18 PROCEDURE — 82962 GLUCOSE BLOOD TEST: CPT | Performed by: SURGERY

## 2022-01-18 PROCEDURE — 45378 DIAGNOSTIC COLONOSCOPY: CPT

## 2022-01-18 RX ORDER — FENTANYL CITRATE 50 UG/ML
INJECTION, SOLUTION INTRAMUSCULAR; INTRAVENOUS PRN
Status: DISCONTINUED | OUTPATIENT
Start: 2022-01-18 | End: 2022-01-18 | Stop reason: HOSPADM

## 2022-01-18 RX ORDER — ONDANSETRON 2 MG/ML
4 INJECTION INTRAMUSCULAR; INTRAVENOUS
Status: DISCONTINUED | OUTPATIENT
Start: 2022-01-18 | End: 2022-01-26 | Stop reason: HOSPADM

## 2022-01-18 RX ORDER — LIDOCAINE 40 MG/G
CREAM TOPICAL
Status: DISCONTINUED | OUTPATIENT
Start: 2022-01-18 | End: 2022-01-26 | Stop reason: HOSPADM

## 2022-02-05 DIAGNOSIS — E11.9 TYPE 2 DIABETES MELLITUS WITHOUT COMPLICATION, WITHOUT LONG-TERM CURRENT USE OF INSULIN (H): ICD-10-CM

## 2022-02-07 NOTE — TELEPHONE ENCOUNTER
Prescription approved per [facility] Refill Protocol.  Alison Villeda RN, BSN   [facility]

## 2022-04-06 ENCOUNTER — MYC MEDICAL ADVICE (OUTPATIENT)
Dept: FAMILY MEDICINE | Facility: CLINIC | Age: 47
End: 2022-04-06
Payer: COMMERCIAL

## 2022-04-06 DIAGNOSIS — I10 ESSENTIAL HYPERTENSION WITH GOAL BLOOD PRESSURE LESS THAN 140/90: ICD-10-CM

## 2022-04-06 DIAGNOSIS — E11.9 TYPE 2 DIABETES MELLITUS WITHOUT COMPLICATION, WITHOUT LONG-TERM CURRENT USE OF INSULIN (H): ICD-10-CM

## 2022-04-06 DIAGNOSIS — E78.5 HYPERLIPIDEMIA LDL GOAL <70: ICD-10-CM

## 2022-04-07 RX ORDER — LISINOPRIL 2.5 MG/1
2.5 TABLET ORAL DAILY
Qty: 30 TABLET | Refills: 0 | Status: SHIPPED | OUTPATIENT
Start: 2022-04-07 | End: 2022-04-28

## 2022-04-07 RX ORDER — AMLODIPINE BESYLATE 5 MG/1
5 TABLET ORAL DAILY
Qty: 30 TABLET | Refills: 0 | Status: SHIPPED | OUTPATIENT
Start: 2022-04-07 | End: 2022-04-28

## 2022-04-07 RX ORDER — ATORVASTATIN CALCIUM 10 MG/1
10 TABLET, FILM COATED ORAL AT BEDTIME
Qty: 90 TABLET | Refills: 0 | Status: SHIPPED | OUTPATIENT
Start: 2022-04-07 | End: 2022-04-28

## 2022-04-07 RX ORDER — TRIAMTERENE AND HYDROCHLOROTHIAZIDE 37.5; 25 MG/1; MG/1
1 CAPSULE ORAL DAILY
Qty: 30 CAPSULE | Refills: 0 | Status: SHIPPED | OUTPATIENT
Start: 2022-04-07 | End: 2022-04-28

## 2022-04-07 NOTE — TELEPHONE ENCOUNTER
Routing refill request to provider for review/approval because:  Labs not current:  LDL, A1C    Appointments in Next Year      May 24, 2022  5:00 PM  (Arrive by 4:40 PM)  Provider Visit with Dolores Baldwin MD  [facility] ([facility] ) [phone]

## 2022-04-07 NOTE — TELEPHONE ENCOUNTER
Please review and place appropriate lab orders.    Leticia Tolbert RN [facility]

## 2022-04-27 ENCOUNTER — MYC REFILL (OUTPATIENT)
Dept: FAMILY MEDICINE | Facility: CLINIC | Age: 47
End: 2022-04-27
Payer: COMMERCIAL

## 2022-04-27 DIAGNOSIS — E11.9 TYPE 2 DIABETES MELLITUS WITHOUT COMPLICATION, WITHOUT LONG-TERM CURRENT USE OF INSULIN (H): ICD-10-CM

## 2022-04-27 DIAGNOSIS — I10 ESSENTIAL HYPERTENSION WITH GOAL BLOOD PRESSURE LESS THAN 140/90: ICD-10-CM

## 2022-04-27 DIAGNOSIS — E78.5 HYPERLIPIDEMIA LDL GOAL <70: ICD-10-CM

## 2022-04-27 RX ORDER — AMLODIPINE BESYLATE 5 MG/1
5 TABLET ORAL DAILY
Qty: 30 TABLET | Refills: 0 | Status: CANCELLED | OUTPATIENT
Start: 2022-04-27

## 2022-04-27 RX ORDER — ATORVASTATIN CALCIUM 10 MG/1
10 TABLET, FILM COATED ORAL AT BEDTIME
Qty: 90 TABLET | Refills: 0 | Status: CANCELLED | OUTPATIENT
Start: 2022-04-27

## 2022-04-27 RX ORDER — TRIAMTERENE AND HYDROCHLOROTHIAZIDE 37.5; 25 MG/1; MG/1
1 CAPSULE ORAL DAILY
Qty: 30 CAPSULE | Refills: 0 | Status: CANCELLED | OUTPATIENT
Start: 2022-04-27

## 2022-04-27 RX ORDER — LISINOPRIL 2.5 MG/1
2.5 TABLET ORAL DAILY
Qty: 30 TABLET | Refills: 0 | Status: CANCELLED | OUTPATIENT
Start: 2022-04-27

## 2022-04-28 RX ORDER — ATORVASTATIN CALCIUM 10 MG/1
10 TABLET, FILM COATED ORAL AT BEDTIME
Qty: 90 TABLET | Refills: 0 | Status: SHIPPED | OUTPATIENT
Start: 2022-04-28 | End: 2022-07-01

## 2022-04-28 RX ORDER — AMLODIPINE BESYLATE 5 MG/1
5 TABLET ORAL DAILY
Qty: 90 TABLET | Refills: 0 | Status: SHIPPED | OUTPATIENT
Start: 2022-04-28 | End: 2022-07-01

## 2022-04-28 RX ORDER — TRIAMTERENE AND HYDROCHLOROTHIAZIDE 37.5; 25 MG/1; MG/1
1 CAPSULE ORAL DAILY
Qty: 90 CAPSULE | Refills: 0 | Status: SHIPPED | OUTPATIENT
Start: 2022-04-28 | End: 2022-07-01

## 2022-04-28 RX ORDER — LISINOPRIL 2.5 MG/1
2.5 TABLET ORAL DAILY
Qty: 90 TABLET | Refills: 0 | Status: SHIPPED | OUTPATIENT
Start: 2022-04-28 | End: 2022-07-01

## 2022-05-08 ENCOUNTER — MYC MEDICAL ADVICE (OUTPATIENT)
Dept: FAMILY MEDICINE | Facility: CLINIC | Age: 47
End: 2022-05-08
Payer: COMMERCIAL

## 2022-05-08 ENCOUNTER — HEALTH MAINTENANCE LETTER (OUTPATIENT)
Age: 47
End: 2022-05-08

## 2022-05-12 ENCOUNTER — DOCUMENTATION ONLY (OUTPATIENT)
Dept: LAB | Facility: CLINIC | Age: 47
End: 2022-05-12
Payer: COMMERCIAL

## 2022-05-12 DIAGNOSIS — E11.9 TYPE 2 DIABETES MELLITUS WITHOUT COMPLICATION, WITHOUT LONG-TERM CURRENT USE OF INSULIN (H): Primary | ICD-10-CM

## 2022-05-19 ENCOUNTER — LAB (OUTPATIENT)
Dept: LAB | Facility: CLINIC | Age: 47
End: 2022-05-19
Payer: COMMERCIAL

## 2022-05-19 DIAGNOSIS — E11.9 TYPE 2 DIABETES MELLITUS WITHOUT COMPLICATION, WITHOUT LONG-TERM CURRENT USE OF INSULIN (H): ICD-10-CM

## 2022-05-19 LAB
ALBUMIN SERPL-MCNC: 3.7 G/DL (ref 3.4–5)
ALP SERPL-CCNC: 66 U/L (ref 40–150)
ALT SERPL W P-5'-P-CCNC: 51 U/L (ref 0–70)
ANION GAP SERPL CALCULATED.3IONS-SCNC: 7 MMOL/L (ref 3–14)
AST SERPL W P-5'-P-CCNC: 27 U/L (ref 0–45)
BILIRUB SERPL-MCNC: 0.4 MG/DL (ref 0.2–1.3)
BUN SERPL-MCNC: 11 MG/DL (ref 7–30)
CALCIUM SERPL-MCNC: 9.2 MG/DL (ref 8.5–10.1)
CHLORIDE BLD-SCNC: 105 MMOL/L (ref 94–109)
CHOLEST SERPL-MCNC: 129 MG/DL
CO2 SERPL-SCNC: 26 MMOL/L (ref 20–32)
CREAT SERPL-MCNC: 0.8 MG/DL (ref 0.66–1.25)
CREAT UR-MCNC: 182 MG/DL
FASTING STATUS PATIENT QL REPORTED: YES
GFR SERPL CREATININE-BSD FRML MDRD: >90 ML/MIN/1.73M2
GLUCOSE BLD-MCNC: 101 MG/DL (ref 70–99)
HBA1C MFR BLD: 5.7 % (ref 0–5.6)
HDLC SERPL-MCNC: 45 MG/DL
LDLC SERPL CALC-MCNC: 53 MG/DL
MICROALBUMIN UR-MCNC: 6 MG/L
MICROALBUMIN/CREAT UR: 3.3 MG/G CR (ref 0–17)
NONHDLC SERPL-MCNC: 84 MG/DL
POTASSIUM BLD-SCNC: 4.1 MMOL/L (ref 3.4–5.3)
PROT SERPL-MCNC: 7 G/DL (ref 6.8–8.8)
SODIUM SERPL-SCNC: 138 MMOL/L (ref 133–144)
TRIGL SERPL-MCNC: 156 MG/DL

## 2022-05-19 PROCEDURE — 83036 HEMOGLOBIN GLYCOSYLATED A1C: CPT

## 2022-05-19 PROCEDURE — 36415 COLL VENOUS BLD VENIPUNCTURE: CPT

## 2022-05-19 PROCEDURE — 80061 LIPID PANEL: CPT

## 2022-05-19 PROCEDURE — 82043 UR ALBUMIN QUANTITATIVE: CPT

## 2022-05-19 PROCEDURE — 80053 COMPREHEN METABOLIC PANEL: CPT

## 2022-05-19 NOTE — RESULT ENCOUNTER NOTE
Fred,  -Your cholesterol panel is normal except for a slightly elevated triglyceride level. Aerobic exercise, weight loss and moderating your carbohydrate intake (especially simple sugars) will all help to lower the triglyceride level.   - the kidney, liver and electrolyte panel was normal.   - the a1c shows excellent diabetes control  Please MyChart or call if you have any concerns or questions.   Sincerely,  Destini Rodríguez MD  (for Dr. Baldwin who is out of the office today)

## 2022-07-01 ENCOUNTER — VIRTUAL VISIT (OUTPATIENT)
Dept: FAMILY MEDICINE | Facility: CLINIC | Age: 47
End: 2022-07-01
Payer: COMMERCIAL

## 2022-07-01 DIAGNOSIS — I10 ESSENTIAL HYPERTENSION WITH GOAL BLOOD PRESSURE LESS THAN 140/90: ICD-10-CM

## 2022-07-01 DIAGNOSIS — E11.9 TYPE 2 DIABETES MELLITUS WITHOUT COMPLICATION, WITHOUT LONG-TERM CURRENT USE OF INSULIN (H): Primary | ICD-10-CM

## 2022-07-01 DIAGNOSIS — E78.5 HYPERLIPIDEMIA LDL GOAL <70: ICD-10-CM

## 2022-07-01 PROCEDURE — 99214 OFFICE O/P EST MOD 30 MIN: CPT | Mod: GT | Performed by: FAMILY MEDICINE

## 2022-07-01 RX ORDER — ATORVASTATIN CALCIUM 10 MG/1
10 TABLET, FILM COATED ORAL AT BEDTIME
Qty: 90 TABLET | Refills: 1 | Status: SHIPPED | OUTPATIENT
Start: 2022-07-01 | End: 2023-01-27

## 2022-07-01 RX ORDER — TRIAMTERENE AND HYDROCHLOROTHIAZIDE 37.5; 25 MG/1; MG/1
1 CAPSULE ORAL DAILY
Qty: 90 CAPSULE | Refills: 1 | Status: SHIPPED | OUTPATIENT
Start: 2022-07-01 | End: 2023-01-27

## 2022-07-01 RX ORDER — AMLODIPINE BESYLATE 5 MG/1
5 TABLET ORAL DAILY
Qty: 90 TABLET | Refills: 1 | Status: SHIPPED | OUTPATIENT
Start: 2022-07-01 | End: 2023-01-27

## 2022-07-01 RX ORDER — LISINOPRIL 2.5 MG/1
2.5 TABLET ORAL DAILY
Qty: 90 TABLET | Refills: 1 | Status: SHIPPED | OUTPATIENT
Start: 2022-07-01 | End: 2023-01-27

## 2022-07-01 NOTE — PROGRESS NOTES
Helena is a 47 year old who is being evaluated via a billable video visit.      How would you like to obtain your AVS? MyChart  If the video visit is dropped, the invitation should be resent by: Text to cell phone: [phone]   Will anyone else be joining your video visit? No          Assessment & Plan     Type 2 diabetes mellitus without complication, without long-term current use of insulin (H)  Stable on current regimen.  Continue same plan and routine follow-up.   - lisinopril (ZESTRIL) 2.5 MG tablet; Take 1 tablet (2.5 mg) by mouth daily  - metFORMIN (GLUCOPHAGE) 500 MG tablet; TAKE 1 TABLET BY MOUTH  DAILY WITH DINNER    Essential hypertension with goal blood pressure less than 140/90  Stable on current regimen.  Continue same plan and routine follow-up.   - amLODIPine (NORVASC) 5 MG tablet; Take 1 tablet (5 mg) by mouth daily  - lisinopril (ZESTRIL) 2.5 MG tablet; Take 1 tablet (2.5 mg) by mouth daily  - triamterene-HCTZ (DYAZIDE) 37.5-25 MG capsule; Take 1 capsule by mouth daily    Hyperlipidemia LDL goal <70  Stable on current regimen.  Continue same plan and routine follow-up.   - atorvastatin (LIPITOR) 10 MG tablet; Take 1 tablet (10 mg) by mouth At Bedtime         See Patient Instructions    Return in about 6 months (around 1/1/2023) for Diabetes follow up, In Office or Video, Previsit labwork.    Dolores Baldwin MD  [facility]   Helena is a 47 year old, presenting for the following health issues:  Diabetes      History of Present Illness       Diabetes:   He presents for follow up of diabetes.  He is not checking blood glucose. He has no concerns regarding his diabetes at this time.  He is not experiencing numbness or burning in feet, excessive thirst, blurry vision, weight changes or redness, sores or blisters on feet.     He consumes 0 sweetened beverage(s) daily.He exercises with enough effort to increase his heart rate 9 or less minutes per day.  He exercises  with enough effort to increase his heart rate 3 or less days per week.   He is taking medications regularly.       Video visit patient today in follow-up of diabetes, hypertension, lipids  Doing well.  Reports no interval health concerns.      Review of Systems   Constitutional, HEENT, cardiovascular, pulmonary, gi and gu systems are negative, except as otherwise noted.      Objective           Vitals:  No vitals were obtained today due to virtual visit.    Physical Exam   GENERAL: Healthy, alert and no distress  EYES: Eyes grossly normal to inspection.  No discharge or erythema, or obvious scleral/conjunctival abnormalities.  RESP: No audible wheeze, cough, or visible cyanosis.  No visible retractions or increased work of breathing.    SKIN: Visible skin clear. No significant rash, abnormal pigmentation or lesions.  NEURO: Cranial nerves grossly intact.  Mentation and speech appropriate for age.  PSYCH: Mentation appears normal, affect normal/bright, judgement and insight intact, normal speech and appearance well-groomed.    Past labs reviewed with the patient.             Video-Visit Details    Video Start Time: 0906    Type of service:  Video Visit    Video End Time:0910    Originating Location (pt. Location): Home    Distant Location (provider location):  [facility]     Platform used for Video Visit: [employer]  ..

## 2022-07-26 ENCOUNTER — E-VISIT (OUTPATIENT)
Dept: FAMILY MEDICINE | Facility: CLINIC | Age: 47
End: 2022-07-26
Payer: COMMERCIAL

## 2022-07-26 DIAGNOSIS — Z20.822 SUSPECTED COVID-19 VIRUS INFECTION: Primary | ICD-10-CM

## 2022-07-26 PROCEDURE — 99421 OL DIG E/M SVC 5-10 MIN: CPT | Performed by: PHYSICIAN ASSISTANT

## 2022-07-27 ENCOUNTER — LAB (OUTPATIENT)
Dept: URGENT CARE | Facility: URGENT CARE | Age: 47
End: 2022-07-27
Attending: PHYSICIAN ASSISTANT
Payer: COMMERCIAL

## 2022-07-27 DIAGNOSIS — Z20.822 SUSPECTED COVID-19 VIRUS INFECTION: ICD-10-CM

## 2022-07-27 PROCEDURE — 99207 PR NO CHARGE LOS: CPT | Performed by: PHYSICIAN ASSISTANT

## 2022-07-27 PROCEDURE — U0005 INFEC AGEN DETEC AMPLI PROBE: HCPCS | Performed by: PHYSICIAN ASSISTANT

## 2022-07-27 PROCEDURE — U0003 INFECTIOUS AGENT DETECTION BY NUCLEIC ACID (DNA OR RNA); SEVERE ACUTE RESPIRATORY SYNDROME CORONAVIRUS 2 (SARS-COV-2) (CORONAVIRUS DISEASE [COVID-19]), AMPLIFIED PROBE TECHNIQUE, MAKING USE OF HIGH THROUGHPUT TECHNOLOGIES AS DESCRIBED BY CMS-2020-01-R: HCPCS | Performed by: PHYSICIAN ASSISTANT

## 2022-07-27 NOTE — PATIENT INSTRUCTIONS
Dear Helena,      Based on your responses, you may have coronavirus (COVID-19).     Will I be tested for COVID-19?  We would like to test you for COVID. I have placed orders for this test.     To schedule: go to your [employer] home page and scroll down to the section that says  You have an appointment that needs to be scheduled  and click the large green button that says  Schedule Now  and follow the steps to find the next available openings.    If you are unable to complete these [employer] scheduling steps, please call [phone] to schedule your testing.     These guidelines are for isolating before returning to work, school or .     For employers, schools and day cares: This is an official notice for this person s medical guidelines for returning in-person.     For health care sites: The CDC gives different isolation and quarantine guidelines for healthcare sites, please check with these sites before arriving.     How do I self-isolate?  You isolate when you have symptoms of COVID or a test shows you have COVID, even if you don t have symptoms.     If you DO have symptoms:  o Stay home and away from others  - For at least 5 days after your symptoms started, AND   - You are fever free for 24 hours (with no medicine that reduces fever), AND  - Your other symptoms are better.  o Wear a mask for 10 full days any time you are around others.    If you DON T have symptoms:  o Stay at home and away from others for at least 5 days after your positive test.  o Wear a mask for 10 full days any time you are around others.    How can I take care of myself?  Over the counter medications may help with your symptoms such as runny or stuffy nose, cough, chills, or fever.  Talk to your care team about your options.     Some people are at high risk of severe illness (for example, you have a weak immune system, you re 65 years or older, or you have certain medical problems). If your risk is high and your symptoms started in the  last 5 to 7 days, we strongly recommend for you to get COVID treatment as soon as possible. Paxlovid, Molnupiravir and the monoclonal antibody treatments are proven safe and effective, make you feel better faster, and prevent hospitalization and death.       To schedule an appointment to discuss COVID treatment, request an appointment on MyChart (select  COVID-19 Treatment ) or call [phone] ([phone]), press 7.      Get lots of rest. Drink extra fluids (unless a doctor has told you not to)    Take Tylenol (acetaminophen) or ibuprofen for fever or pain. If you have liver or kidney problems, ask your family doctor if it's okay to take Tylenol or ibuprofen    Take over the counter medications for your symptoms, as directed by your doctor. You may also talk to your pharmacist.      If you have other health problems (like cancer, heart failure, an organ transplant or severe kidney disease): Call your specialty clinic if you don't feel better in the next 2 days.    Know when to call 911. Emergency warning signs include:  o Trouble breathing or shortness of breath  o Pain or pressure in the chest that doesn't go away  o Feeling confused like you haven't felt before, or not being able to wake up  o Bluish-colored lips or face    Where can I get more information?    [facility] - About COVID-19: [email or web address]     CDC - What to Do If You're Sick: https://www.cdc.gov/coronavirus/2019-ncov/if-you-are-sick/index.html     CDC - Quarantine & Isolation: https://www.cdc.gov/coronavirus/2019-ncov/your-health/quarantine-isolation.html     [facility] clinical trials (COVID-19 research studies): [email or web address]    Below are the COVID-19 hotlines at the [facility] ([facility]). Interpreters are available.  o For health questions: Call [phone] or [phone] (7 a.m. to 7 p.m.)  o For questions about schools and childcare: Call [phone] or  [phone] (7 a.m. to 7 p.m.)

## 2022-07-28 ENCOUNTER — TELEPHONE (OUTPATIENT)
Dept: NURSING | Facility: CLINIC | Age: 47
End: 2022-07-28

## 2022-07-28 LAB — SARS-COV-2 RNA RESP QL NAA+PROBE: POSITIVE

## 2022-07-28 NOTE — TELEPHONE ENCOUNTER
Patient classified as COVID treatment eligible by Epic high risk algorithm:  Yes    Coronavirus (COVID-19) Notification    Reason for call  Notify of POSITIVE COVID-19 lab result, assess symptoms,  review [facility] recommendations    Lab Result   Lab test for 2019-nCoV rRt-PCR or SARS-COV-2 PCR  Oropharyngeal AND/OR nasopharyngeal swabs were POSITIVE for 2019-nCoV RNA [OR] SARS-COV-2 RNA (COVID-19) RNA     We have been unable to reach patient by phone at this time to notify of their Positive COVID-19 result.    Left voicemail message requesting a call back to [phone] [facility] for results.        A Positive COVID-19 letter will be sent via [email or web address] or the mail.    Akilah Barnes

## 2022-10-25 ENCOUNTER — IMMUNIZATION (OUTPATIENT)
Dept: NURSING | Facility: CLINIC | Age: 47
End: 2022-10-25
Payer: COMMERCIAL

## 2022-10-25 PROCEDURE — 90686 IIV4 VACC NO PRSV 0.5 ML IM: CPT

## 2022-10-25 PROCEDURE — 91312 COVID-19,PF,PFIZER BOOSTER BIVALENT: CPT

## 2022-10-25 PROCEDURE — 90471 IMMUNIZATION ADMIN: CPT

## 2022-10-25 PROCEDURE — 0124A COVID-19,PF,PFIZER BOOSTER BIVALENT: CPT

## 2022-11-25 ENCOUNTER — E-VISIT (OUTPATIENT)
Dept: FAMILY MEDICINE | Facility: CLINIC | Age: 47
End: 2022-11-25
Payer: COMMERCIAL

## 2022-11-25 DIAGNOSIS — M54.50 ACUTE BILATERAL LOW BACK PAIN WITHOUT SCIATICA: ICD-10-CM

## 2022-11-25 PROCEDURE — 99421 OL DIG E/M SVC 5-10 MIN: CPT | Performed by: PHYSICIAN ASSISTANT

## 2022-11-25 RX ORDER — METHYLPREDNISOLONE 4 MG
TABLET, DOSE PACK ORAL
Qty: 21 TABLET | Refills: 0 | Status: SHIPPED | OUTPATIENT
Start: 2022-11-25 | End: 2022-12-01

## 2022-12-01 ENCOUNTER — THERAPY VISIT (OUTPATIENT)
Dept: PHYSICAL THERAPY | Facility: CLINIC | Age: 47
End: 2022-12-01
Attending: PHYSICIAN ASSISTANT
Payer: COMMERCIAL

## 2022-12-01 DIAGNOSIS — G89.29 CHRONIC BILATERAL LOW BACK PAIN WITH RIGHT-SIDED SCIATICA: Primary | ICD-10-CM

## 2022-12-01 DIAGNOSIS — M54.41 CHRONIC BILATERAL LOW BACK PAIN WITH RIGHT-SIDED SCIATICA: Primary | ICD-10-CM

## 2022-12-01 DIAGNOSIS — M54.50 ACUTE BILATERAL LOW BACK PAIN WITHOUT SCIATICA: ICD-10-CM

## 2022-12-01 DIAGNOSIS — M54.41 ACUTE BILATERAL LOW BACK PAIN WITH RIGHT-SIDED SCIATICA: ICD-10-CM

## 2022-12-01 PROCEDURE — 97530 THERAPEUTIC ACTIVITIES: CPT | Mod: GP | Performed by: PHYSICAL THERAPIST

## 2022-12-01 PROCEDURE — 97110 THERAPEUTIC EXERCISES: CPT | Mod: GP | Performed by: PHYSICAL THERAPIST

## 2022-12-01 PROCEDURE — 97161 PT EVAL LOW COMPLEX 20 MIN: CPT | Mod: GP | Performed by: PHYSICAL THERAPIST

## 2022-12-01 NOTE — PROGRESS NOTES
Physical Therapy Initial Evaluation  Subjective:  The history is provided by the patient. No  was used.   Patient Health History  Fred Núñez being seen for Right sided leg pain into calf and into foot. History of low back pain with sciatica that was well managed with exercises from previous PT session. Recent exacerbation after prolonged sitting on wooden chairs over a couple week period resulting in back spasms and increase in R leg pain.     Problem began: 11/12/2022.   Problem occurred: prolonged sitting on hard wood chair   Pain is reported as 7/10 on pain scale.  General health as reported by patient is good.  Pertinent medical history includes: diabetes.   Red flags:  None as reported by patient.  Medical allergies: none.   Surgeries include:  None.    Current medications:  High blood pressure medication and steroids.    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                  Therapist Generated HPI Evaluation         Type of problem:  Lumbar.    This is a chronic condition.  Occurance: prolonged sitting.  Where condition occurred: in the community.  Patient reports pain:  Lumbar spine right, lumbar spine left, lower lumbar spine and mid lumbar spine.  Pain is described as burning and shooting and is constant.  Pain radiates to:  Gluteals right and thigh right.   Since onset symptoms are gradually worsening.  Associated symptoms:  Loss of motion/stiffness and loss of strength (denies increased pain with sneezing, coughing or toileting). Exacerbated by: walking or kicking right leg straight, prolonged standing.  Relieved by: TENs, old PT stretches (mostly press ups)    Previous treatment includes physical therapy (about 2017 per patient). There was moderate improvement following previous treatment.  Restrictions due to condition include:  Working in normal job without restrictions.  Barriers include:  None as reported by  patient.                        Objective:  System         Lumbar/SI Evaluation  ROM:    AROM Lumbar:   Flexion:          To mid tibia, tightness  Ext:                    Mod loss, incr pain into R calf in standing;    Side Bend:        Left:     Right:   Rotation:           Left:     Right:   Side Glide:        Left:  Mod loss    Right:  Min loss          Lumbar Myotomes:    T12-L3 (Hip Flex):  Left: 5    Right: 5  L2-4 (Quads):  Left:  5    Right:  5  L4 (Ankle DF):  Left:  5    Right:  5  L5 (Great Toe Ext): Left: 5    Right: 5   S1 (Toe Raise):  Left: 5    Right: 4  Lumbar DTR's:  normal      Cord Signs:  not assessed    Lumbar Dermtomes:  normal                Neural Tension/Mobility:      Right side:   SLR w/DF; Slump and SLR positive.  Lumbar Palpation:      Tenderness present at Right: Quadratus Lumborum      Spinal Segmental Conclusions: Not assessed today d/t time constraints            SI joint/Sacrum:    Negative                                                           Cata Lumbar Evaluation        Test Movements:        EIL: During: produces  After: no effect  Mechanical Response: no effect  Repeat EIL: During: produces  After: no effect  Mechanical Response: no effect                                                 ROS    Assessment/Plan:    Patient is a 47 year old male with lumbar complaints.    Patient has the following significant findings with corresponding treatment plan.                Diagnosis 1:  Low back pain with right sided sciatica  Pain -  hot/cold therapy, mechanical traction, manual therapy, self management, education, directional preference exercise and home program  Decreased ROM/flexibility - manual therapy, therapeutic exercise and home program  Decreased joint mobility - manual therapy, therapeutic exercise and home program  Decreased strength - therapeutic exercise, therapeutic activities and home program  Impaired muscle performance - neuro re-education and home  program  Decreased function - therapeutic activities and home program  Impaired posture - neuro re-education and home program    Therapy Evaluation Codes:   Cumulative Therapy Evaluation is: Low complexity.    Previous and current functional limitations:  (See Goal Flow Sheet for this information)    Short term and Long term goals: (See Goal Flow Sheet for this information)     Communication ability:  Patient appears to be able to clearly communicate and understand verbal and written communication and follow directions correctly.  Treatment Explanation - The following has been discussed with the patient:   RX ordered/plan of care  Anticipated outcomes  Possible risks and side effects  This patient would benefit from PT intervention to resume normal activities.   Rehab potential is good.    Frequency:  1 X week, once daily  Duration:  for 8 weeks  Discharge Plan:  Achieve all LTG.  Independent in home treatment program.  Reach maximal therapeutic benefit.    Please refer to the daily flowsheet for treatment today, total treatment time and time spent performing 1:1 timed codes.

## 2022-12-08 ENCOUNTER — THERAPY VISIT (OUTPATIENT)
Dept: PHYSICAL THERAPY | Facility: CLINIC | Age: 47
End: 2022-12-08
Payer: COMMERCIAL

## 2022-12-08 DIAGNOSIS — M54.41 ACUTE BILATERAL LOW BACK PAIN WITH RIGHT-SIDED SCIATICA: Primary | ICD-10-CM

## 2022-12-08 PROCEDURE — 97530 THERAPEUTIC ACTIVITIES: CPT | Mod: GP | Performed by: PHYSICAL THERAPIST

## 2022-12-08 PROCEDURE — 97110 THERAPEUTIC EXERCISES: CPT | Mod: GP | Performed by: PHYSICAL THERAPIST

## 2022-12-15 ENCOUNTER — THERAPY VISIT (OUTPATIENT)
Dept: PHYSICAL THERAPY | Facility: CLINIC | Age: 47
End: 2022-12-15
Payer: COMMERCIAL

## 2022-12-15 DIAGNOSIS — M54.41 ACUTE BILATERAL LOW BACK PAIN WITH RIGHT-SIDED SCIATICA: Primary | ICD-10-CM

## 2022-12-15 PROCEDURE — 97110 THERAPEUTIC EXERCISES: CPT | Mod: GP | Performed by: PHYSICAL THERAPIST

## 2022-12-15 PROCEDURE — 97140 MANUAL THERAPY 1/> REGIONS: CPT | Mod: GP | Performed by: PHYSICAL THERAPIST

## 2022-12-22 ENCOUNTER — THERAPY VISIT (OUTPATIENT)
Dept: PHYSICAL THERAPY | Facility: CLINIC | Age: 47
End: 2022-12-22
Payer: COMMERCIAL

## 2022-12-22 DIAGNOSIS — M54.41 ACUTE BILATERAL LOW BACK PAIN WITH RIGHT-SIDED SCIATICA: Primary | ICD-10-CM

## 2022-12-22 PROCEDURE — 97140 MANUAL THERAPY 1/> REGIONS: CPT | Mod: GP | Performed by: PHYSICAL THERAPIST

## 2022-12-22 PROCEDURE — 97110 THERAPEUTIC EXERCISES: CPT | Mod: GP | Performed by: PHYSICAL THERAPIST

## 2022-12-29 ENCOUNTER — THERAPY VISIT (OUTPATIENT)
Dept: PHYSICAL THERAPY | Facility: CLINIC | Age: 47
End: 2022-12-29
Payer: COMMERCIAL

## 2022-12-29 DIAGNOSIS — M54.41 ACUTE BILATERAL LOW BACK PAIN WITH RIGHT-SIDED SCIATICA: Primary | ICD-10-CM

## 2022-12-29 PROCEDURE — 97110 THERAPEUTIC EXERCISES: CPT | Mod: GP | Performed by: PHYSICAL THERAPIST

## 2022-12-29 PROCEDURE — 97140 MANUAL THERAPY 1/> REGIONS: CPT | Mod: GP | Performed by: PHYSICAL THERAPIST

## 2023-01-05 ENCOUNTER — THERAPY VISIT (OUTPATIENT)
Dept: PHYSICAL THERAPY | Facility: CLINIC | Age: 48
End: 2023-01-05
Payer: COMMERCIAL

## 2023-01-05 DIAGNOSIS — M54.41 ACUTE BILATERAL LOW BACK PAIN WITH RIGHT-SIDED SCIATICA: Primary | ICD-10-CM

## 2023-01-05 PROCEDURE — 97110 THERAPEUTIC EXERCISES: CPT | Mod: GP | Performed by: PHYSICAL THERAPIST

## 2023-01-05 PROCEDURE — 97140 MANUAL THERAPY 1/> REGIONS: CPT | Mod: GP | Performed by: PHYSICAL THERAPIST

## 2023-01-26 DIAGNOSIS — E78.5 HYPERLIPIDEMIA LDL GOAL <70: ICD-10-CM

## 2023-01-26 DIAGNOSIS — I10 ESSENTIAL HYPERTENSION WITH GOAL BLOOD PRESSURE LESS THAN 140/90: ICD-10-CM

## 2023-01-26 DIAGNOSIS — Z12.5 PROSTATE CANCER SCREENING: Primary | ICD-10-CM

## 2023-01-26 DIAGNOSIS — E11.9 TYPE 2 DIABETES MELLITUS WITHOUT COMPLICATION, WITHOUT LONG-TERM CURRENT USE OF INSULIN (H): ICD-10-CM

## 2023-01-27 RX ORDER — TRIAMTERENE AND HYDROCHLOROTHIAZIDE 37.5; 25 MG/1; MG/1
CAPSULE ORAL
Qty: 90 CAPSULE | Refills: 0 | Status: SHIPPED | OUTPATIENT
Start: 2023-01-27 | End: 2023-02-16

## 2023-01-27 RX ORDER — AMLODIPINE BESYLATE 5 MG/1
TABLET ORAL
Qty: 90 TABLET | Refills: 0 | Status: SHIPPED | OUTPATIENT
Start: 2023-01-27 | End: 2023-02-16

## 2023-01-27 RX ORDER — LISINOPRIL 2.5 MG/1
TABLET ORAL
Qty: 90 TABLET | Refills: 0 | Status: SHIPPED | OUTPATIENT
Start: 2023-01-27 | End: 2023-02-16

## 2023-01-27 RX ORDER — ATORVASTATIN CALCIUM 10 MG/1
TABLET, FILM COATED ORAL
Qty: 90 TABLET | Refills: 1 | Status: SHIPPED | OUTPATIENT
Start: 2023-01-27 | End: 2023-07-26

## 2023-01-27 NOTE — TELEPHONE ENCOUNTER
Refilled x 1.  Needs visit (OFV or video visit) before any further refill.    Patient due for a diabetes follow-up.  I like to have this every 6 months.  I would like it to be in the office and I know I am scheduling out a ways.  So please have him schedule now.  Previsit labs will be ordered.

## 2023-02-09 ENCOUNTER — LAB (OUTPATIENT)
Dept: LAB | Facility: CLINIC | Age: 48
End: 2023-02-09
Payer: COMMERCIAL

## 2023-02-09 DIAGNOSIS — E11.9 TYPE 2 DIABETES MELLITUS WITHOUT COMPLICATION, WITHOUT LONG-TERM CURRENT USE OF INSULIN (H): ICD-10-CM

## 2023-02-09 DIAGNOSIS — Z12.5 PROSTATE CANCER SCREENING: ICD-10-CM

## 2023-02-09 LAB
ALBUMIN SERPL-MCNC: 4.2 G/DL (ref 3.4–5)
ALP SERPL-CCNC: 68 U/L (ref 40–150)
ALT SERPL W P-5'-P-CCNC: 38 U/L (ref 0–70)
ANION GAP SERPL CALCULATED.3IONS-SCNC: 6 MMOL/L (ref 3–14)
AST SERPL W P-5'-P-CCNC: 17 U/L (ref 0–45)
BILIRUB SERPL-MCNC: 0.5 MG/DL (ref 0.2–1.3)
BUN SERPL-MCNC: 15 MG/DL (ref 7–30)
CALCIUM SERPL-MCNC: 9.3 MG/DL (ref 8.5–10.1)
CHLORIDE BLD-SCNC: 102 MMOL/L (ref 94–109)
CHOLEST SERPL-MCNC: 153 MG/DL
CO2 SERPL-SCNC: 29 MMOL/L (ref 20–32)
CREAT SERPL-MCNC: 0.85 MG/DL (ref 0.66–1.25)
CREAT UR-MCNC: 239 MG/DL
FASTING STATUS PATIENT QL REPORTED: YES
GFR SERPL CREATININE-BSD FRML MDRD: >90 ML/MIN/1.73M2
GLUCOSE BLD-MCNC: 110 MG/DL (ref 70–99)
HBA1C MFR BLD: 5.9 % (ref 0–5.6)
HDLC SERPL-MCNC: 56 MG/DL
LDLC SERPL CALC-MCNC: 70 MG/DL
MICROALBUMIN UR-MCNC: 11 MG/L
MICROALBUMIN/CREAT UR: 4.6 MG/G CR (ref 0–17)
NONHDLC SERPL-MCNC: 97 MG/DL
POTASSIUM BLD-SCNC: 4.4 MMOL/L (ref 3.4–5.3)
PROT SERPL-MCNC: 7.1 G/DL (ref 6.8–8.8)
PSA SERPL-MCNC: 0.78 UG/L (ref 0–4)
SODIUM SERPL-SCNC: 137 MMOL/L (ref 133–144)
TRIGL SERPL-MCNC: 136 MG/DL

## 2023-02-09 PROCEDURE — 83036 HEMOGLOBIN GLYCOSYLATED A1C: CPT

## 2023-02-09 PROCEDURE — 82570 ASSAY OF URINE CREATININE: CPT

## 2023-02-09 PROCEDURE — 82043 UR ALBUMIN QUANTITATIVE: CPT

## 2023-02-09 PROCEDURE — G0103 PSA SCREENING: HCPCS

## 2023-02-09 PROCEDURE — 80053 COMPREHEN METABOLIC PANEL: CPT

## 2023-02-09 PROCEDURE — 80061 LIPID PANEL: CPT

## 2023-02-09 PROCEDURE — 36415 COLL VENOUS BLD VENIPUNCTURE: CPT

## 2023-02-09 NOTE — RESULT ENCOUNTER NOTE
Hi Pat,   Your A1C is at 5.9, fairly stable. Other labs are pending still.  Dr. Baldwin is out of the office this week and I am one of the providers helping to cover for him.     Please call if you have further questions.   Thank you,  YANELI Henao, NP-C

## 2023-02-16 ENCOUNTER — VIRTUAL VISIT (OUTPATIENT)
Dept: FAMILY MEDICINE | Facility: CLINIC | Age: 48
End: 2023-02-16
Payer: COMMERCIAL

## 2023-02-16 DIAGNOSIS — E11.9 TYPE 2 DIABETES MELLITUS WITHOUT COMPLICATION, WITHOUT LONG-TERM CURRENT USE OF INSULIN (H): Primary | ICD-10-CM

## 2023-02-16 DIAGNOSIS — E78.5 HYPERLIPIDEMIA LDL GOAL <70: ICD-10-CM

## 2023-02-16 DIAGNOSIS — I10 ESSENTIAL HYPERTENSION WITH GOAL BLOOD PRESSURE LESS THAN 140/90: ICD-10-CM

## 2023-02-16 PROBLEM — M54.50 ACUTE BILATERAL LOW BACK PAIN WITHOUT SCIATICA: Status: RESOLVED | Noted: 2022-12-01 | Resolved: 2023-02-16

## 2023-02-16 PROBLEM — M54.41 ACUTE RIGHT-SIDED LOW BACK PAIN WITH RIGHT-SIDED SCIATICA: Status: RESOLVED | Noted: 2018-09-07 | Resolved: 2023-02-16

## 2023-02-16 PROCEDURE — 99214 OFFICE O/P EST MOD 30 MIN: CPT | Mod: VID | Performed by: FAMILY MEDICINE

## 2023-02-16 RX ORDER — AMLODIPINE BESYLATE 5 MG/1
5 TABLET ORAL DAILY
Qty: 90 TABLET | Refills: 0 | Status: SHIPPED | OUTPATIENT
Start: 2023-02-16 | End: 2023-04-27

## 2023-02-16 RX ORDER — TRIAMTERENE AND HYDROCHLOROTHIAZIDE 37.5; 25 MG/1; MG/1
1 CAPSULE ORAL DAILY
Qty: 90 CAPSULE | Refills: 0 | Status: SHIPPED | OUTPATIENT
Start: 2023-02-16 | End: 2023-04-27

## 2023-02-16 RX ORDER — LISINOPRIL 2.5 MG/1
2.5 TABLET ORAL DAILY
Qty: 90 TABLET | Refills: 0 | Status: SHIPPED | OUTPATIENT
Start: 2023-02-16 | End: 2023-04-27

## 2023-02-16 NOTE — PROGRESS NOTES
Helena is a 47 year old who is being evaluated via a billable video visit.      How would you like to obtain your AVS? MyChart  If the video visit is dropped, the invitation should be resent by: Text to cell phone: [phone]  Will anyone else be joining your video visit? No          Assessment & Plan     Type 2 diabetes mellitus without complication, without long-term current use of insulin (H)  A1c fantastically controlled at 5.9.  We will continue same therapy and plan follow-up in about 6 months.  - Adult Eye  Referral; Future  - metFORMIN (GLUCOPHAGE) 500 MG tablet; TAKE 1 TABLET BY MOUTH  DAILY WITH DINNER  - lisinopril (ZESTRIL) 2.5 MG tablet; Take 1 tablet (2.5 mg) by mouth daily    Essential hypertension with goal blood pressure less than 140/90  Stable on current regimen.  Continue same plan and routine follow-up.   - amLODIPine (NORVASC) 5 MG tablet; Take 1 tablet (5 mg) by mouth daily  - lisinopril (ZESTRIL) 2.5 MG tablet; Take 1 tablet (2.5 mg) by mouth daily  - triamterene-HCTZ (DYAZIDE) 37.5-25 MG capsule; Take 1 capsule by mouth daily    Hyperlipidemia LDL goal <70  Stable on current regimen.  Continue same plan and routine follow-up.            See Patient Instructions    Return in about 6 months (around 8/16/2023) for Diabetes follow up, In Office or Video, Previsit labwork.    Dolores Baldwin MD  [facility]   Helena is a 47 year old, presenting for the following health issues:  Diabetes      History of Present Illness       Diabetes:   He presents for follow up of diabetes.  He is not checking blood glucose. He has no concerns regarding his diabetes at this time.  He is not experiencing numbness or burning in feet, excessive thirst, blurry vision, weight changes or redness, sores or blisters on feet. The patient has not had a diabetic eye exam in the last 12 months.     He consumes 0 sweetened beverage(s) daily.He exercises with enough effort to  increase his heart rate 9 or less minutes per day.  He exercises with enough effort to increase his heart rate 3 or less days per week.   He is taking medications regularly.       Video visit with patient in follow-up of diabetes, hypertension, lipids.  Doing well overall.  Had a significant flareup of sciatica a couple of months ago but that is doing much better.    Review of Systems   Constitutional, HEENT, cardiovascular, pulmonary, gi and gu systems are negative, except as otherwise noted.      Objective    Vitals - Patient Reported  Pain Score: No Pain (0)      Vitals:  No vitals were obtained today due to virtual visit.    Physical Exam   GENERAL: Healthy, alert and no distress  EYES: Eyes grossly normal to inspection.  No discharge or erythema, or obvious scleral/conjunctival abnormalities.  RESP: No audible wheeze, cough, or visible cyanosis.  No visible retractions or increased work of breathing.    SKIN: Visible skin clear. No significant rash, abnormal pigmentation or lesions.  NEURO: Cranial nerves grossly intact.  Mentation and speech appropriate for age.  PSYCH: Mentation appears normal, affect normal/bright, judgement and insight intact, normal speech and appearance well-groomed.    Past labs reviewed with the patient.             Video-Visit Details    Type of service:  Video Visit     Originating Location (pt. Location): Home    Distant Location (provider location):  Off-site  Platform used for Video Visit: AQU[employer] PURE

## 2023-03-22 NOTE — PROGRESS NOTES
Discharge Note    Progress reporting period is from initial evaluation date (please see noted date below) to Jan 5, 2023.  Linked Episodes   Type: Episode: Status: Noted: Resolved: Last update: Updated by:   PHYSICAL THERAPY LBP 12/1/22 Active 12/1/2022 1/5/2023  9:11 AM Zackery Danielson, PT      Comments:       Fred failed to follow up and current status is unknown.  Please see information below for last relevant information on current status.  Patient seen for 6 visits.    SUBJECTIVE  Subjective changes noted by patient:  Patient reports between the pressups and using the TENS he has been feeling better and better. His pain is no longer in his calf and more in the outside of his knee so seems to be centralizing.  .  Current pain level is  .     Previous pain level was   .   Changes in function:  Yes (See Goal flowsheet attached for changes in current functional level)  Adverse reaction to treatment or activity: None    OBJECTIVE  Changes noted in objective findings: PF 5/5 right. hip flexion 5/5, knee extension 5/5, ankle DF 5/5. able to heel and toe walk. Lumbar flexion hands to toes no pain, extension moderate limitatonwith perpherialized symptoms into leg but no worse after.     ASSESSMENT/PLAN  Diagnosis: Low back pain with right sided leg pain   Updated problem list and treatment plan:   Pain - HEP  Decreased ROM/flexibility - HEP  STG/LTGs have been met or progress has been made towards goals:  Yes, please see goal flowsheet for most current information  Assessment of Progress: current status is unknown.    Last current status:     Self Management Plans:  HEP  I have re-evaluated this patient and find that the nature, scope, duration and intensity of the therapy is appropriate for the medical condition of the patient.  Fred continues to require the following intervention to meet STG and LTG's:  HEP.    Recommendations:  Discharge with current home program.  Patient to follow up with MD as  needed.    Please refer to the daily flowsheet for treatment today, total treatment time and time spent performing 1:1 timed codes.

## 2023-04-26 DIAGNOSIS — I10 ESSENTIAL HYPERTENSION WITH GOAL BLOOD PRESSURE LESS THAN 140/90: ICD-10-CM

## 2023-04-26 DIAGNOSIS — E11.9 TYPE 2 DIABETES MELLITUS WITHOUT COMPLICATION, WITHOUT LONG-TERM CURRENT USE OF INSULIN (H): ICD-10-CM

## 2023-04-27 RX ORDER — LISINOPRIL 2.5 MG/1
TABLET ORAL
Qty: 90 TABLET | Refills: 1 | Status: SHIPPED | OUTPATIENT
Start: 2023-04-27 | End: 2023-09-19

## 2023-04-27 RX ORDER — TRIAMTERENE AND HYDROCHLOROTHIAZIDE 37.5; 25 MG/1; MG/1
CAPSULE ORAL
Qty: 90 CAPSULE | Refills: 1 | Status: SHIPPED | OUTPATIENT
Start: 2023-04-27 | End: 2023-09-19

## 2023-04-27 RX ORDER — AMLODIPINE BESYLATE 5 MG/1
TABLET ORAL
Qty: 90 TABLET | Refills: 1 | Status: SHIPPED | OUTPATIENT
Start: 2023-04-27 | End: 2023-09-19

## 2023-05-30 ENCOUNTER — TELEPHONE (OUTPATIENT)
Dept: FAMILY MEDICINE | Facility: CLINIC | Age: 48
End: 2023-05-30
Payer: COMMERCIAL

## 2023-05-30 NOTE — TELEPHONE ENCOUNTER
Patient Quality Outreach    Patient is due for the following:   Diabetes -  Foot Exam  Hypertension -  BP check  Physical Preventive Adult Physical    Next Steps:   Schedule a Adult Preventative    Type of outreach:    Sent Xi[employer] message.      Questions for provider review:    None           Araceli Diallo MA

## 2023-06-02 ENCOUNTER — HEALTH MAINTENANCE LETTER (OUTPATIENT)
Age: 48
End: 2023-06-02

## 2023-06-09 ENCOUNTER — OFFICE VISIT (OUTPATIENT)
Dept: OPHTHALMOLOGY | Facility: CLINIC | Age: 48
End: 2023-06-09
Attending: FAMILY MEDICINE
Payer: COMMERCIAL

## 2023-06-09 DIAGNOSIS — H52.203 MYOPIA OF BOTH EYES WITH ASTIGMATISM AND PRESBYOPIA: ICD-10-CM

## 2023-06-09 DIAGNOSIS — E11.9 TYPE 2 DIABETES MELLITUS WITHOUT COMPLICATION, WITHOUT LONG-TERM CURRENT USE OF INSULIN (H): Primary | ICD-10-CM

## 2023-06-09 DIAGNOSIS — H52.4 MYOPIA OF BOTH EYES WITH ASTIGMATISM AND PRESBYOPIA: ICD-10-CM

## 2023-06-09 DIAGNOSIS — H50.111 EXOTROPIA OF RIGHT EYE: ICD-10-CM

## 2023-06-09 DIAGNOSIS — H52.13 MYOPIA OF BOTH EYES WITH ASTIGMATISM AND PRESBYOPIA: ICD-10-CM

## 2023-06-09 PROCEDURE — 92004 COMPRE OPH EXAM NEW PT 1/>: CPT | Performed by: OPHTHALMOLOGY

## 2023-06-09 PROCEDURE — 92015 DETERMINE REFRACTIVE STATE: CPT | Performed by: OPHTHALMOLOGY

## 2023-06-09 ASSESSMENT — REFRACTION_MANIFEST
OS_SPHERE: -0.50
OS_AXIS: 005
OS_CYLINDER: +0.75
OD_ADD: +2.00
OD_CYLINDER: +0.50
OD_AXIS: 165
OS_ADD: +2.00
OD_SPHERE: -0.50

## 2023-06-09 ASSESSMENT — TONOMETRY
IOP_METHOD: ICARE
OS_IOP_MMHG: 15
OD_IOP_MMHG: 17

## 2023-06-09 ASSESSMENT — VISUAL ACUITY
CORRECTION_TYPE: GLASSES
OD_SC: 20/25
OD_SC+: +1
OS_SC+: -1
OS_SC: 20/20
METHOD: SNELLEN - LINEAR

## 2023-06-09 ASSESSMENT — CONF VISUAL FIELD
OD_INFERIOR_NASAL_RESTRICTION: 0
METHOD: COUNTING FINGERS
OS_SUPERIOR_NASAL_RESTRICTION: 0
OD_SUPERIOR_TEMPORAL_RESTRICTION: 0
OD_NORMAL: 1
OS_SUPERIOR_TEMPORAL_RESTRICTION: 0
OD_SUPERIOR_NASAL_RESTRICTION: 0
OD_INFERIOR_TEMPORAL_RESTRICTION: 0
OS_NORMAL: 1
OS_INFERIOR_TEMPORAL_RESTRICTION: 0
OS_INFERIOR_NASAL_RESTRICTION: 0

## 2023-06-09 ASSESSMENT — SLIT LAMP EXAM - LIDS
COMMENTS: NORMAL
COMMENTS: NORMAL

## 2023-06-09 ASSESSMENT — REFRACTION_WEARINGRX
OS_AXIS: 097
OD_AXIS: 167
OD_CYLINDER: +0.75
OD_SPHERE: -1.00
OS_CYLINDER: +0.50
OS_SPHERE: -1.25

## 2023-06-09 ASSESSMENT — CUP TO DISC RATIO
OS_RATIO: 0.3
OD_RATIO: 0.3

## 2023-06-09 NOTE — NURSING NOTE
Chief Complaints and History of Present Illnesses   Patient presents with     Diabetic Eye Exam       Chief Complaint(s) and History of Present Illness(es)     Diabetic Eye Exam            Diabetes Type: Type 2          Comments    Patient here for diabetic eye exam.   Has RX glasses for driving.   Feels that over all vision might be declining.   No pain, no flashes, no floaters.       DM2  Lab Results       Component                Value               Date                       A1C                      5.9                 02/09/2023                 A1C                      5.7                 05/19/2022                 A1C                      6.3                 10/05/2021                 A1C                      5.9                 01/14/2021                 A1C                      5.7                 12/19/2019                 A1C                      5.8                 05/16/2019                 A1C                      5.8                 11/08/2018                 A1C                      5.6                 04/17/2018                            Nas Singleton, Ophthalmic Assistant

## 2023-06-12 NOTE — PROGRESS NOTES
HPI     Diabetic Eye Exam    Diabetes characteristics include Type 2.           Comments    Patient here for diabetic eye exam.   Has RX glasses for driving.   Feels that over all vision might be declining.   No pain, no flashes, no floaters.       DM2  Lab Results       Component                Value               Date                       A1C                      5.9                 02/09/2023                 A1C                      5.7                 05/19/2022                 A1C                      6.3                 10/05/2021                 A1C                      5.9                 01/14/2021                 A1C                      5.7                 12/19/2019                 A1C                      5.8                 05/16/2019                 A1C                      5.8                 11/08/2018                 A1C                      5.6                 04/17/2018        T2DM since 2017  Dad with iritis                    Last edited by Santos Valero MD on 6/9/2023  2:45 PM.         Review of systems for the eyes was negative other than the pertinent positives/negatives listed in the HPI.      Assessment & Plan    HPI:  Fred Núñez is a 47 year old male with history of T2DM, HLD, HTN presents for eye exam. He feels that his vision may be decreased somewhat. No redness, tearing, flashes or floaters    POHx: myopia with astigmatism and presbyopia  PMHx: T2DM, HLD, HTN  Current Medications: amLODIPine (NORVASC) 5 MG tablet, TAKE 1 TABLET DAILY  atorvastatin (LIPITOR) 10 MG tablet, TAKE 1 TABLET AT BEDTIME  lisinopril (ZESTRIL) 2.5 MG tablet, TAKE 1 TABLET DAILY  Magnesium-Potassium 250-100 MG TABS,   metFORMIN (GLUCOPHAGE) 500 MG tablet, TAKE 1 TABLET DAILY WITH DINNER  multivitamin w/minerals (THERA-VIT-M) tablet, Take 1 tablet by mouth daily  OMEPRAZOLE PO, Take 20 mg by mouth daily  order for DME, Equipment being ordered: Blood pressure monitor.  Check blood pressure  every morning.  Goal blood pressure: systolic less than 125; diastolic blood pressure less than 75.  triamterene-HCTZ (DYAZIDE) 37.5-25 MG capsule, TAKE 1 CAPSULE DAILY (NEED FOLLOW UP VISIT FOR ANY FURTHER REFILL)    No current facility-administered medications on file prior to visit.    FHx: dad-iritis  PSHx: denies history of ocular surgeries       Current Eye Medications:      Assessment & Plan:  (E11.9) Type 2 diabetes mellitus without complication, without long-term current use of insulin (H)  (primary encounter diagnosis)  Diagnosed 2017  Most recent HgBA1c 5.9 on 2/9/23  No background diabetic retinopathy or neovascularization noted on today's exam.  Discussed ocular and systemic benefits of blood pressure and blood sugar control.  Return in 1 year for full exam with dilation or sooner if changes to vision.      Recommend dilated exam next year  Patient unable today due to work obligations  No retinopathy on peripheral undilated exam, A1C great x many years    (H50.111) Exotropia of right eye  exotropia with decreased stereopsis  Alternate fixates without diplopia   Discussed possible intervention, however patient not bothered currently    (H52.13,  H52.203,  H52.4) Myopia of both eyes with astigmatism and presbyopia  Patient has minimal change in myopia but a copy of today's glasses prescription was given.  The patient may wish to update the glasses if the lenses are scratched or the frames are too small.  Presbyopia is difficulty seeing up close and is treated with bifocals or over the counter reading glasses      Return in about 1 year (around 6/9/2024) for Annual Visit.        Santos Valero MD     Attending Physician Attestation:  Complete documentation of historical and exam elements from today's encounter can be found in the full encounter summary report (not reduplicated in this progress note).  I personally obtained the chief complaint(s) and history of present illness.  I confirmed and edited  as necessary the review of systems, past medical/surgical history, family history, social history, and examination findings as documented by others; and I examined the patient myself.  I personally reviewed the relevant tests, images, and reports as documented above.  I formulated and edited as necessary the assessment and plan and discussed the findings and management plan with the patient and family. - Santos Valero MD

## 2023-07-25 DIAGNOSIS — E78.5 HYPERLIPIDEMIA LDL GOAL <70: ICD-10-CM

## 2023-07-26 DIAGNOSIS — E11.9 TYPE 2 DIABETES MELLITUS WITHOUT COMPLICATION, WITHOUT LONG-TERM CURRENT USE OF INSULIN (H): ICD-10-CM

## 2023-07-26 RX ORDER — ATORVASTATIN CALCIUM 10 MG/1
TABLET, FILM COATED ORAL
Qty: 90 TABLET | Refills: 0 | Status: SHIPPED | OUTPATIENT
Start: 2023-07-26 | End: 2023-09-19

## 2023-07-26 NOTE — LETTER
July 28, 2023      Fred Núñez  [id number]  [place] [facility] [address]    Dear Fred Núñez      Your current medication request for Prescription metFORMIN (GLUCOPHAGE) 500 MG tablet has been filled.    In reviewing your chart it appears you are in need of Schedule a DIABETIC FOLLOW UP appointment for Office Visit. Patients with diabetes should see their provider regularly..    If you need assistance with scheduling or have already completed these items, please call the clinic at [phone] or [employer] so your care team can review and update your records.     Thank you for choosing [facility] for your healthcare needs.          Best regards,  [facility] Care Team  [facility]

## 2023-09-05 ENCOUNTER — E-VISIT (OUTPATIENT)
Dept: FAMILY MEDICINE | Facility: CLINIC | Age: 48
End: 2023-09-05
Payer: COMMERCIAL

## 2023-09-05 DIAGNOSIS — M54.2 NECK PAIN: Primary | ICD-10-CM

## 2023-09-05 PROCEDURE — 99421 OL DIG E/M SVC 5-10 MIN: CPT | Performed by: FAMILY MEDICINE

## 2023-09-05 RX ORDER — METHYLPREDNISOLONE 4 MG
TABLET, DOSE PACK ORAL
Qty: 21 TABLET | Refills: 0 | Status: SHIPPED | OUTPATIENT
Start: 2023-09-05 | End: 2023-10-05

## 2023-09-06 ENCOUNTER — DOCUMENTATION ONLY (OUTPATIENT)
Dept: FAMILY MEDICINE | Facility: CLINIC | Age: 48
End: 2023-09-06
Payer: COMMERCIAL

## 2023-09-06 DIAGNOSIS — E11.9 TYPE 2 DIABETES MELLITUS WITHOUT COMPLICATION, WITHOUT LONG-TERM CURRENT USE OF INSULIN (H): Primary | ICD-10-CM

## 2023-09-06 NOTE — PROGRESS NOTES
Patient have a lab appointment on 9-14-23, but there is no order. Could you order please if needed.  Thank you,  Shanika Price MLT(ASCP)

## 2023-09-08 ENCOUNTER — NURSE TRIAGE (OUTPATIENT)
Dept: FAMILY MEDICINE | Facility: CLINIC | Age: 48
End: 2023-09-08
Payer: COMMERCIAL

## 2023-09-08 NOTE — TELEPHONE ENCOUNTER
Doubt acute coronary symptoms.  No need to be seen right away unless worse or more frequent.  I will respond to his message.

## 2023-09-08 NOTE — TELEPHONE ENCOUNTER
"Nurse Triage SBAR    Is this a 2nd Level Triage? YES, LICENSED PRACTITIONER REVIEW IS REQUIRED    Situation:   Patient sent [employer] message stating he has had chest \"tension\" in left chest, above nipple, and below collar bone for past week. He states ocassionally gets \"light stabbing\" pain in same spot. He denies pain radiating anywhere else, he denies sweating, shortness of breath, nausea, vomiting.    He states it comes and goes. It is worse at the end of the day. It lasts hours until he takes Aleve, then it goes away completely.     He states work has been extremely stressful the past few weeks as he is on a big project.    Background: Patient seen in urgent care at [facility] on 9/4/23. Per  note: \"EKG examination does not show any changes consistent with acute coronary syndrome. It appears normal.\"    Patient has HTN and family h/o heart disease    Assessment: Patient should call 911. Patient uninterested and states EKG was normal in urgent care. Just wants to get this on PCP radar.    Protocol Recommended Disposition:   Call  Now    Recommendation: Please advise.     Routed to provider    Does the patient meet one of the following criteria for ADS visit consideration? 16+ years old, with an [facility] PCP     HIWOT OdomN, RN  [facility]  Nurse Triage, [facility]    Reason for Disposition   Chest pain lasting longer than 5 minutes and over 44 years old    Additional Information   Negative: SEVERE difficulty breathing (e.g., struggling for each breath, speaks in single words)   Negative: Difficult to awaken or acting confused (e.g., disoriented, slurred speech)   Negative: Shock suspected (e.g., cold/pale/clammy skin, too weak to stand, low BP, rapid pulse)   Negative: Passed out (i.e., lost consciousness, collapsed and was not responding)    Answer Assessment - Initial Assessment Questions  1. LOCATION: \"Where does it hurt?\"        Left side, above nipple below " "collar bone  2. RADIATION: \"Does the pain go anywhere else?\" (e.g., into neck, jaw, arms, back)      no  3. ONSET: \"When did the chest pain begin?\" (Minutes, hours or days)       About 1 week ago  4. PATTERN: \"Does the pain come and go, or has it been constant since it started?\"  \"Does it get worse with exertion?\"       It comes and goes. Usually worse toward the end of the day. It lasts hours, until I take Aleve, then it completely goes away.   5. DURATION: \"How long does it last\" (e.g., seconds, minutes, hours)      Hours  6. SEVERITY: \"How bad is the pain?\"  (e.g., Scale 1-10; mild, moderate, or severe)      mild  7. CARDIAC RISK FACTORS: \"Do you have any history of heart problems or risk factors for heart disease?\" (e.g., angina, prior heart attack; diabetes, high blood pressure, high cholesterol, smoker, or strong family history of heart disease)      High blood pressure, strong family history of heart disease  8. PULMONARY RISK FACTORS: \"Do you have any history of lung disease?\"  (e.g., blood clots in lung, asthma, emphysema, birth control pills)      No  9. CAUSE: \"What do you think is causing the chest pain?\"      Not sure  10. OTHER SYMPTOMS: \"Do you have any other symptoms?\" (e.g., dizziness, nausea, vomiting, sweating, fever, difficulty breathing, cough)        None  11. PREGNANCY: \"Is there any chance you are pregnant?\" \"When was your last menstrual period?\"        N/A    Protocols used: Chest Pain-A-[place]    "

## 2023-09-10 PROBLEM — Z82.49 FAMILY HISTORY OF EARLY CAD: Status: ACTIVE | Noted: 2023-09-10

## 2023-09-14 ENCOUNTER — LAB (OUTPATIENT)
Dept: LAB | Facility: CLINIC | Age: 48
End: 2023-09-14
Payer: COMMERCIAL

## 2023-09-14 DIAGNOSIS — E11.9 TYPE 2 DIABETES MELLITUS WITHOUT COMPLICATION, WITHOUT LONG-TERM CURRENT USE OF INSULIN (H): ICD-10-CM

## 2023-09-14 LAB — HBA1C MFR BLD: 6.3 % (ref 0–5.6)

## 2023-09-14 PROCEDURE — 36415 COLL VENOUS BLD VENIPUNCTURE: CPT

## 2023-09-14 PROCEDURE — 83036 HEMOGLOBIN GLYCOSYLATED A1C: CPT

## 2023-09-14 NOTE — RESULT ENCOUNTER NOTE
Fred,  Your A1c level shows excellent overall diabetes control.  Please MyChart or call if you have any concerns or questions.   Sincerely,  Destini Rodríguez MD  (for Dr. Baldwin who is out of the office today)

## 2023-09-19 ENCOUNTER — VIRTUAL VISIT (OUTPATIENT)
Dept: FAMILY MEDICINE | Facility: CLINIC | Age: 48
End: 2023-09-19
Payer: COMMERCIAL

## 2023-09-19 DIAGNOSIS — I10 ESSENTIAL HYPERTENSION WITH GOAL BLOOD PRESSURE LESS THAN 140/90: ICD-10-CM

## 2023-09-19 DIAGNOSIS — R07.89 ATYPICAL CHEST PAIN: ICD-10-CM

## 2023-09-19 DIAGNOSIS — E78.5 HYPERLIPIDEMIA LDL GOAL <70: ICD-10-CM

## 2023-09-19 DIAGNOSIS — E11.9 TYPE 2 DIABETES MELLITUS WITHOUT COMPLICATION, WITHOUT LONG-TERM CURRENT USE OF INSULIN (H): Primary | ICD-10-CM

## 2023-09-19 PROCEDURE — 99214 OFFICE O/P EST MOD 30 MIN: CPT | Mod: VID | Performed by: FAMILY MEDICINE

## 2023-09-19 RX ORDER — LISINOPRIL 5 MG/1
2.5 TABLET ORAL DAILY
Qty: 90 TABLET | Refills: 1 | Status: SHIPPED | OUTPATIENT
Start: 2023-09-19 | End: 2023-10-15

## 2023-09-19 RX ORDER — TRIAMTERENE AND HYDROCHLOROTHIAZIDE 37.5; 25 MG/1; MG/1
CAPSULE ORAL
Qty: 90 CAPSULE | Refills: 1 | Status: SHIPPED | OUTPATIENT
Start: 2023-09-19 | End: 2024-05-16

## 2023-09-19 RX ORDER — ATORVASTATIN CALCIUM 10 MG/1
10 TABLET, FILM COATED ORAL AT BEDTIME
Qty: 90 TABLET | Refills: 1 | Status: SHIPPED | OUTPATIENT
Start: 2023-09-19 | End: 2023-10-15

## 2023-09-19 RX ORDER — AMLODIPINE BESYLATE 5 MG/1
5 TABLET ORAL DAILY
Qty: 90 TABLET | Refills: 1 | Status: SHIPPED | OUTPATIENT
Start: 2023-09-19 | End: 2024-03-19

## 2023-09-19 NOTE — PROGRESS NOTES
Helena is a 48 year old who is being evaluated via a billable video visit.      How would you like to obtain your AVS? MyChart  If the video visit is dropped, the invitation should be resent by: Send to e-mail at: [email or web address]  Will anyone else be joining your video visit? No          Assessment & Plan     Type 2 diabetes mellitus without complication, without long-term current use of insulin (H)  A1c well controlled.  So we will continue same regimen and plan to recheck in 6 months.  It was a little bit higher than previous and blood pressure has been trending up a little bit.  Had a long discussion about diet and exercise.  But the bigger issue is a recent ER visit for some atypical chest pain.  Those records are reviewed with patient and in his chart.  He has multiple risk factors personally as well as a strong family history.  His father had his first angioplasty at age 46.  He is scheduled to see Dr. Rivero but not until January.  So I am not worried about this being an acute issue but I think we can help solve some of the Paul by getting him set up for cardiac testing between now and then.  I think a great way to start be a CT calcium score and that might drive further testing (including possible angio gram) if elevated.  - lisinopril (ZESTRIL) 5 MG tablet; Take 0.5 tablets (2.5 mg) by mouth daily  - metFORMIN (GLUCOPHAGE) 500 MG tablet; Take 1 tablet (500 mg) by mouth daily (with dinner)  - CT Coronary Calcium Scan; Future    Essential hypertension with goal blood pressure less than 140/90  Controlled but we will bump up to 5 mg daily  - lisinopril (ZESTRIL) 5 MG tablet; Take 0.5 tablets (2.5 mg) by mouth daily  - amLODIPine (NORVASC) 5 MG tablet; Take 1 tablet (5 mg) by mouth daily  - triamterene-HCTZ (DYAZIDE) 37.5-25 MG capsule; TAKE 1 CAPSULE DAILY (NEED FOLLOW UP VISIT FOR ANY FURTHER REFILL)  - CT Coronary Calcium Scan; Future    Hyperlipidemia LDL goal <70  Stable on current regimen.   Continue same plan and routine follow-up.   - atorvastatin (LIPITOR) 10 MG tablet; Take 1 tablet (10 mg) by mouth At Bedtime  - CT Coronary Calcium Scan; Future    Atypical chest pain  Details as above.  - CT Coronary Calcium Scan; Future       See Patient Instructions    Dolores Baldwin MD  [facility] MARCO ANTONIO Malik is a 48 year old, presenting for the following health issues:  Diabetes and Hypertension        9/19/2023     1:09 PM   Additional Questions   Roomed by Anthony SUMNER       History of Present Illness       Diabetes:   He presents for follow up of diabetes.    He is not checking blood glucose.         He has no concerns regarding his diabetes at this time.   He is not experiencing numbness or burning in feet, excessive thirst, blurry vision, weight changes or redness, sores or blisters on feet.           Hypertension: He presents for follow up of hypertension.  He does check blood pressure  regularly outside of the clinic. Outside blood pressures have been over 140/90. He does not follow a low salt diet.     He eats 2-3 servings of fruits and vegetables daily.He consumes 0 sweetened beverage(s) daily.He exercises with enough effort to increase his heart rate 9 or less minutes per day.  He exercises with enough effort to increase his heart rate 3 or less days per week.   He is taking medications regularly.         Video visit patient today in follow-up of diabetes, hypertension, lipids.  Follow-up on recent ER visit.      Review of Systems   Constitutional, HEENT, cardiovascular, pulmonary, gi and gu systems are negative, except as otherwise noted.      Objective    Vitals - Patient Reported  Systolic (Patient Reported): 135  Diastolic (Patient Reported): 80      Vitals:  No vitals were obtained today due to virtual visit.    Physical Exam   GENERAL: Healthy, alert and no distress  EYES: Eyes grossly normal to inspection.  No discharge or erythema, or obvious  scleral/conjunctival abnormalities.  RESP: No audible wheeze, cough, or visible cyanosis.  No visible retractions or increased work of breathing.    SKIN: Visible skin clear. No significant rash, abnormal pigmentation or lesions.  NEURO: Cranial nerves grossly intact.  Mentation and speech appropriate for age.  PSYCH: Mentation appears normal, affect normal/bright, judgement and insight intact, normal speech and appearance well-groomed.    Past labs reviewed with the patient.             Video-Visit Details    Type of service:  Video Visit     Originating Location (pt. Location): Home    Distant Location (provider location):  Off-site  Platform used for Video Visit: [employer]

## 2023-09-21 ENCOUNTER — ANCILLARY ORDERS (OUTPATIENT)
Dept: FAMILY MEDICINE | Facility: CLINIC | Age: 48
End: 2023-09-21

## 2023-09-21 DIAGNOSIS — E11.9 TYPE 2 DIABETES MELLITUS WITHOUT COMPLICATION, WITHOUT LONG-TERM CURRENT USE OF INSULIN (H): ICD-10-CM

## 2023-09-21 DIAGNOSIS — I10 ESSENTIAL HYPERTENSION WITH GOAL BLOOD PRESSURE LESS THAN 140/90: Primary | ICD-10-CM

## 2023-09-21 DIAGNOSIS — E78.5 HYPERLIPIDEMIA LDL GOAL <70: ICD-10-CM

## 2023-09-21 DIAGNOSIS — R07.89 ATYPICAL CHEST PAIN: ICD-10-CM

## 2023-09-26 ENCOUNTER — IMMUNIZATION (OUTPATIENT)
Dept: FAMILY MEDICINE | Facility: CLINIC | Age: 48
End: 2023-09-26
Payer: COMMERCIAL

## 2023-09-26 DIAGNOSIS — Z23 ENCOUNTER FOR IMMUNIZATION: Primary | ICD-10-CM

## 2023-09-26 DIAGNOSIS — Z23 NEED FOR PROPHYLACTIC VACCINATION AND INOCULATION AGAINST INFLUENZA: ICD-10-CM

## 2023-09-26 PROCEDURE — 90471 IMMUNIZATION ADMIN: CPT

## 2023-09-26 PROCEDURE — 99207 PR NO CHARGE NURSE ONLY: CPT

## 2023-09-26 PROCEDURE — 90686 IIV4 VACC NO PRSV 0.5 ML IM: CPT

## 2023-09-26 NOTE — PROGRESS NOTES
Prior to immunization administration, verified patients identity using patient s name and date of birth. Please see Immunization Activity for additional information.     Screening Questionnaire for Adult Immunization    Are you sick today?   No   Do you have allergies to medications, food, a vaccine component or latex?   No   Have you ever had a serious reaction after receiving a vaccination?   No   Do you have a long-term health problem with heart, lung, kidney, or metabolic disease (e.g., diabetes), asthma, a blood disorder, no spleen, complement component deficiency, a cochlear implant, or a spinal fluid leak?  Are you on long-term aspirin therapy?   No   Do you have cancer, leukemia, HIV/AIDS, or any other immune system problem?   No   Do you have a parent, brother, or sister with an immune system problem?   No   In the past 3 months, have you taken medications that affect  your immune system, such as prednisone, other steroids, or anticancer drugs; drugs for the treatment of rheumatoid arthritis, Crohn s disease, or psoriasis; or have you had radiation treatments?   No   Have you had a seizure, or a brain or other nervous system problem?   No   During the past year, have you received a transfusion of blood or blood    products, or been given immune (gamma) globulin or antiviral drug?   No   For women: Are you pregnant or is there a chance you could become       pregnant during the next month?   No   Have you received any vaccinations in the past 4 weeks?   No     Immunization questionnaire answers were all negative.    I have reviewed the following standing orders:   This patient is due and qualifies for the Influenza vaccine.    Click here for Influenza Vaccine Standing Order    I have reviewed the vaccines inclusion and exclusion criteria; No concerns regarding eligibility.     Patient instructed to remain in clinic for 15 minutes afterwards, and to report any adverse reactions.     Screening performed by  Loli Wylie MA on 9/26/2023 at 9:38 AM.

## 2023-10-04 ENCOUNTER — HOSPITAL ENCOUNTER (OUTPATIENT)
Dept: CT IMAGING | Facility: CLINIC | Age: 48
Discharge: HOME | End: 2023-10-04
Attending: FAMILY MEDICINE | Admitting: FAMILY MEDICINE
Payer: COMMERCIAL

## 2023-10-04 PROCEDURE — 75571 CT HRT W/O DYE W/CA TEST: CPT | Mod: 26 | Performed by: INTERNAL MEDICINE

## 2023-10-04 PROCEDURE — 75571 CT HRT W/O DYE W/CA TEST: CPT

## 2023-10-05 ENCOUNTER — IMMUNIZATION (OUTPATIENT)
Dept: NURSING | Facility: CLINIC | Age: 48
End: 2023-10-05
Payer: COMMERCIAL

## 2023-10-05 PROCEDURE — 91320 SARSCV2 VAC 30MCG TRS-SUC IM: CPT

## 2023-10-05 PROCEDURE — 90480 ADMN SARSCOV2 VAC 1/ONLY CMP: CPT

## 2023-10-11 ENCOUNTER — THERAPY VISIT (OUTPATIENT)
Dept: PHYSICAL THERAPY | Facility: CLINIC | Age: 48
End: 2023-10-11
Payer: COMMERCIAL

## 2023-10-11 DIAGNOSIS — M54.2 NECK PAIN: ICD-10-CM

## 2023-10-11 PROCEDURE — 97161 PT EVAL LOW COMPLEX 20 MIN: CPT | Mod: GP | Performed by: PHYSICAL THERAPIST

## 2023-10-11 PROCEDURE — 97140 MANUAL THERAPY 1/> REGIONS: CPT | Mod: GP | Performed by: PHYSICAL THERAPIST

## 2023-10-11 PROCEDURE — 97110 THERAPEUTIC EXERCISES: CPT | Mod: GP | Performed by: PHYSICAL THERAPIST

## 2023-10-11 NOTE — PROGRESS NOTES
PHYSICAL THERAPY EVALUATION  Type of Visit: Evaluation    See electronic medical record for Abuse and Falls Screening details.    Subjective Patient reports the onset of neck pain in 2023 after taking a nap on a couch. He reports the pain is in the base of his skull/upper neck and can travel to his left shoulder. He reports stiffness in his neck and limitation and pain when rotating his head to the left.  He denies numbness, tingling or strength deficits in his upper extremities.     He reports no vision issues, no hearing issues, no jaw pain, no headaches.     He reports this has happened before >3 years ago, but the symptoms were present on the right side of the neck          Presenting condition or subjective complaint: Pinched nerve in neck  (left side)  Date of onset:      Relevant medical history: Diabetes; High blood pressure   Dates & types of surgery:      Prior diagnostic imaging/testing results: CT scan     Prior therapy history for the same diagnosis, illness or injury: Yes      Prior Level of Function  Transfers:   Ambulation:   ADL:   IADL:     Living Environment  Social support: With a significant other or spouse   Type of home: Multi-level   Stairs to enter the home: Yes   Is there a railing: Yes   Ramp: No   Stairs inside the home:   13     Help at home:    Equipment owned:       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  Hobbies/Interests: painting minatures    Patient goals for therapy: Look oevr left shoulder    Pain assessment: Location: left side of neck/Ratin/10     Objective   CERVICAL SPINE EVALUATION  PAIN: Pain Level at Rest: 1/10  Pain Level with Use: 4/10  Pain is Exacerbated By: turning head to the left  INTEGUMENTARY (edema, incisions):   POSTURE: Standing Posture: Rounded shoulders, Forward head  Sitting Posture: Rounded shoulders, Forward head  GAIT:   Weightbearing Status:   Assistive Device(s):   Gait Deviations:   BALANCE/PROPRIOCEPTION:   WEIGHTBEARING ALIGNMENT:   ROM:   Cervical AROM WFL throughout except for rotation to the left 70 deg with pain at end range (rotation right 80+deg without pain)    MYOTOMES: Grossly 4+/5 bilaterally  DTR S:   CORD SIGNS:   DERMATOMES:   NEURAL TENSION:   FLEXIBILITY:    SPECIAL TESTS:  negative Spurling bilaterally, +flexion rotation test  PALPATION:  tenderness over bilateral upper traps and levator  SPINAL SEGMENTAL CONCLUSIONS:  hypomobile C3/4 and T5/6/7/8/9      Assessment & Plan   CLINICAL IMPRESSIONS  Medical Diagnosis: Neck pain    Treatment Diagnosis: Neck pain   Impression/Assessment: Patient is a 48 year old male with Neck pain complaints.  The following significant findings have been identified: Pain, Decreased ROM/flexibility, Decreased joint mobility, Decreased strength, Decreased activity tolerance, and Impaired posture. These impairments interfere with their ability to perform self care tasks, work tasks, recreational activities, household chores, driving , household mobility, and community mobility as compared to previous level of function.     Clinical Decision Making (Complexity):  Clinical Presentation: Stable/Uncomplicated  Clinical Presentation Rationale: based on medical and personal factors listed in PT evaluation  Clinical Decision Making (Complexity): Low complexity    PLAN OF CARE  Treatment Interventions:  Interventions: Manual Therapy, Neuromuscular Re-education, Therapeutic Activity, Therapeutic Exercise    Long Term Goals     PT Goal 1  Goal Identifier: driving  Goal Description: patient will be able to rotate head with 0/10 pain without restriction  Rationale: to maximize safety and independence with performance of ADLs and functional tasks;to maximize safety and independence within the home;to maximize safety and independence within the community;to maximize safety and independence with transportation;to maximize safety and independence with self cares  Target Date: 11/08/23      Frequency of Treatment:    Duration  of Treatment:      Recommended Referrals to Other Professionals:   Education Assessment:   Learner/Method: Demonstration    Risks and benefits of evaluation/treatment have been explained.   Patient/Family/caregiver agrees with Plan of Care.     Evaluation Time:     PT Eval, Low Complexity Minutes (31114): 15       Signing Clinician: Andrez Isbell PT

## 2023-10-13 ENCOUNTER — MYC MEDICAL ADVICE (OUTPATIENT)
Dept: FAMILY MEDICINE | Facility: CLINIC | Age: 48
End: 2023-10-13
Payer: COMMERCIAL

## 2023-10-13 DIAGNOSIS — E11.9 TYPE 2 DIABETES MELLITUS WITHOUT COMPLICATION, WITHOUT LONG-TERM CURRENT USE OF INSULIN (H): ICD-10-CM

## 2023-10-13 DIAGNOSIS — I10 ESSENTIAL HYPERTENSION WITH GOAL BLOOD PRESSURE LESS THAN 140/90: ICD-10-CM

## 2023-10-13 DIAGNOSIS — E78.5 HYPERLIPIDEMIA LDL GOAL <70: ICD-10-CM

## 2023-10-14 ENCOUNTER — MYC MEDICAL ADVICE (OUTPATIENT)
Dept: FAMILY MEDICINE | Facility: CLINIC | Age: 48
End: 2023-10-14
Payer: COMMERCIAL

## 2023-10-15 RX ORDER — LISINOPRIL 5 MG/1
5 TABLET ORAL DAILY
Qty: 90 TABLET | Refills: 1 | Status: SHIPPED | OUTPATIENT
Start: 2023-10-15 | End: 2024-03-19

## 2023-10-15 RX ORDER — ATORVASTATIN CALCIUM 20 MG/1
20 TABLET, FILM COATED ORAL AT BEDTIME
Qty: 90 TABLET | Refills: 1 | Status: SHIPPED | OUTPATIENT
Start: 2023-10-15 | End: 2024-01-05

## 2023-10-16 NOTE — TELEPHONE ENCOUNTER
Routing to provider to review and advise.     Leanne Crook, HIWOTN, RN   [facility]

## 2023-11-19 NOTE — TELEPHONE ENCOUNTER
Prescription approved per [facility] Refill Protocol.    Due for updated labs 12/2017  
triamterene-hydrochlorothiazide (DYAZIDE) 37.5-25 MG per capsule      Last Written Prescription Date: 4/11/17  Last Fill Quantity: 90, # refills: 1  Last Office Visit with [facility] or  Health prescribing provider: 5/30/17 Dr. Baldwin       Potassium   Date Value Ref Range Status   12/08/2016 4.0 3.4 - 5.3 mmol/L Final     Creatinine   Date Value Ref Range Status   12/08/2016 0.90 0.66 - 1.25 mg/dL Final     BP Readings from Last 3 Encounters:   05/30/17 134/76   03/20/17 116/78   03/07/17 126/76       amLODIPine (NORVASC) 10 MG tablet      Last Written Prescription Date: 4/11/17  Last Fill Quantity: 45, # refills: 1    Last Office Visit with [facility], [facility] or  Health prescribing provider:  5/30/17   Future Office Visit:        BP Readings from Last 3 Encounters:   05/30/17 134/76   03/20/17 116/78   03/07/17 126/76       atorvastatin (LIPITOR) 10 MG tablet     Last Written Prescription Date: 4/11/17  Last Fill Quantity: 90, # refills: 1  Last Office Visit with [facility], [facility] or  Health prescribing provider: 5/30/17 Dr. Baldwin       Lab Results   Component Value Date    CHOL 117 12/08/2016     Lab Results   Component Value Date    HDL 40 12/08/2016     Lab Results   Component Value Date    LDL 45 12/08/2016     Lab Results   Component Value Date    TRIG 160 12/08/2016     No results found for: CHOLHDGISELEATIO    
Negative

## 2023-12-14 ENCOUNTER — OFFICE VISIT (OUTPATIENT)
Dept: FAMILY MEDICINE | Facility: CLINIC | Age: 48
End: 2023-12-14
Payer: COMMERCIAL

## 2023-12-14 VITALS
WEIGHT: 163 LBS | SYSTOLIC BLOOD PRESSURE: 158 MMHG | BODY MASS INDEX: 27.83 KG/M2 | RESPIRATION RATE: 16 BRPM | TEMPERATURE: 98.1 F | HEIGHT: 64 IN | OXYGEN SATURATION: 98 % | HEART RATE: 64 BPM | DIASTOLIC BLOOD PRESSURE: 90 MMHG

## 2023-12-14 DIAGNOSIS — I10 ESSENTIAL HYPERTENSION WITH GOAL BLOOD PRESSURE LESS THAN 140/90: ICD-10-CM

## 2023-12-14 DIAGNOSIS — M62.838 MUSCLE SPASMS OF NECK: Primary | ICD-10-CM

## 2023-12-14 PROCEDURE — 99214 OFFICE O/P EST MOD 30 MIN: CPT | Performed by: NURSE PRACTITIONER

## 2023-12-14 RX ORDER — METHOCARBAMOL 500 MG/1
500 TABLET, FILM COATED ORAL 4 TIMES DAILY PRN
Qty: 30 TABLET | Refills: 0 | Status: SHIPPED | OUTPATIENT
Start: 2023-12-14 | End: 2024-04-23

## 2023-12-14 ASSESSMENT — PAIN SCALES - GENERAL: PAINLEVEL: MODERATE PAIN (4)

## 2023-12-14 NOTE — PROGRESS NOTES
"  Assessment & Plan     Muscle spasms of neck  Ok to take robaxin and Iburpofen for pain/muscle spasms, offered physical therapy referral but he declined this., also advised use of heat (20 min on then off)prn  - methocarbamol (ROBAXIN) 500 MG tablet  Dispense: 30 tablet; Refill: 0    Essential hypertension with goal blood pressure less than 140/90  BP elevated today, likely due to pain. Check BP at home and call for BP persistently > 140/90, reviewed low salt diet, regular exercise, and weight loss recommended.      Prescription drug management  12 minutes spent by me on the date of the encounter doing chart review, history and exam, documentation and further activities per the note       BMI:   Estimated body mass index is 27.76 kg/m  as calculated from the following:    Height as of this encounter: 1.632 m (5' 4.25\").    Weight as of this encounter: 73.9 kg (163 lb).   Weight management plan: Discussed healthy diet and exercise guidelines    Work on weight loss  Regular exercise  See Patient Instructions    YANELI [employer]    Donna Malik is a 48 year old, presenting for the following health issues:  Neck Pain        12/14/2023     8:52 AM   Additional Questions   Roomed by yuliana   Accompanied by self       History of Present Illness       Reason for visit:  Pain, tenderness in neck, right side  Symptom onset:  1-3 days ago  Symptoms include:  Pain, tenderness in neck, right side, near base of skull  Symptom intensity:  Mild  Symptom progression:  Worsening  Had these symptoms before:  No  What makes it worse:  Turning head  What makes it better:  Heat    He eats 0-1 servings of fruits and vegetables daily.He consumes 0 sweetened beverage(s) daily.He exercises with enough effort to increase his heart rate 10 to 19 minutes per day.  He exercises with enough effort to increase his heart rate 4 days per week.   He is taking medications regularly.     He got a " "new pillow and thinks he strained his neck while sleeping. He has pain with neck rotatin but no numbness or tingling, UE weakness.    Hypertension Follow-up    Do you check your blood pressure regularly outside of the clinic? Yes   Are you following a low salt diet? Yes  Are your blood pressures ever more than 140 on the top number (systolic) OR more   than 90 on the bottom number (diastolic), for example 140/90? No        Review of Systems   Constitutional, HEENT, cardiovascular, pulmonary, gi and gu systems are negative, except as otherwise noted.      Objective    BP (!) 158/90   Pulse 64   Temp 98.1  F (36.7  C) (Tympanic)   Resp 16   Ht 1.632 m (5' 4.25\")   Wt 73.9 kg (163 lb)   SpO2 98%   BMI 27.76 kg/m    Body mass index is 27.76 kg/m .  Physical Exam   GENERAL: healthy, alert and no distress  EYES: Eyes grossly normal to inspection, PERRL and conjunctivae and sclerae normal  HENT: ear canals and TM's normal, nose and mouth without ulcers or lesions  NECK: no adenopathy, no asymmetry, masses, or scars and thyroid normal to palpation  RESP: lungs clear to auscultation - no rales, rhonchi or wheezes  CV: regular rate and rhythm, normal S1 S2, no S3 or S4, no murmur, click or rub, no peripheral edema and peripheral pulses strong  ABDOMEN: soft, nontender, no hepatosplenomegaly, no masses and bowel sounds normal  MS: no gross musculoskeletal defects noted, no edema  PSYCH: mentation appears normal, affect normal/bright  LYMPH: no cervical adenopathy                      "

## 2024-01-02 NOTE — PROGRESS NOTES
"[facility] Cardiology Consultation:    Assessment and Plan:     Subclinical CAD with mild CAC (calcium score 2023 with CAC score 28, 77 percentile), goal LDL less than 70  Increase Lipitor to 40 mg, check follow-up lipid profile including LP(a)  Check echo to assess LV function  Encouraged patient to begin regular aerobic exercise 20-30 minutes a day, 4-5 times per week and follow a mediterranean style diet including 5 servings each of fruits and vegetables every day.  Nutrition referral  2.  Hypertension, high office BP, controlled at home on current regimen  3.  Diabetes, controlled on metformin    Bishop Rivero MD    Cardiac Imaging and Prevention  [facility]  [email or web address] I Pager: [phone]    HPI: Referred by PCP for preventative visit.  Medical history is significant for hypertension, diabetes, family history of CAD.  I reviewed his CAC scan that was done last year.  It showed mild coronary artery calcifications.  At that point his Lipitor was increased from 10 mg to 20 mg.  Basic labs normal.  Last lipid profile shows an LDL cholesterol of 70.  Hemoglobin A1c is controlled.  Office BPs have been high. BP at home runs around 120s.  I reviewed his ECG that was done in clinic today.  It shows normal sinus rhythm without any concerning changes.  He has had arm pain related to pinched nerve.  He denies any chest pressure on exertion.  Family history significant for premature CAD in his father.  He is a neck smoker, quit about 10 years ago.  He admits to drinking multiple drinks most nights.  He denies illicit drug use  He plays golf weekly during the summer, not as active during the winter.  He does not eat any vegetables, some fruits.  Family eats a lot of processed foods, including pizza and burgers.    EXAM:  BP (!) 145/80 (BP Location: Right arm, Patient Position: Chair, Cuff Size: Adult Regular)   Pulse 100   Ht 1.651 m (5' 5\")   Wt 74.9 kg (165 lb 3.2 " oz)   SpO2 99%   BMI 27.49 kg/m    GEN/CONSTITUIONAL: Appears comfortable, in no apparent distress   EYES: No icterus  ENT/MOUTH: Normal  JVP:  Not visible  RESPIRATORY: Clear to auscultation bilaterally   CARDIOVASCULAR: Regular S1 and S2, no murmurs, rubs, or gallops.   ABDOMEN: Soft, non-tender, positive bowel sounds   NEUROLOGIC: Grossly non-focal   PSYCHIATRIC: Normal affect  EXT: No cyanosis, clubbing, edema. Normal pedal pulses.  Skin: No petechiae, purpura or rash    PAST MEDICAL HISTORY:  Past Medical History:   Diagnosis Date    CARDIOVASCULAR SCREENING; LDL GOAL LESS THAN 160     Diabetes (H)     Hypertension 10/10/2015    blood pressure monitored daily    Malignant hyperthermia     family history- father- pt has not had testing    Migraine headache        CURRENT MEDICATIONS:  Current Outpatient Medications   Medication    amLODIPine (NORVASC) 5 MG tablet    atorvastatin (LIPITOR) 20 MG tablet    lisinopril (ZESTRIL) 5 MG tablet    Magnesium-Potassium 250-100 MG TABS    metFORMIN (GLUCOPHAGE) 500 MG tablet    methocarbamol (ROBAXIN) 500 MG tablet    methylPREDNISolone (MEDROL DOSEPAK) 4 MG tablet therapy pack    multivitamin w/minerals (THERA-VIT-M) tablet    OMEPRAZOLE PO    order for DME    triamterene-HCTZ (DYAZIDE) 37.5-25 MG capsule     No current facility-administered medications for this visit.       PAST SURGICAL HISTORY:  Past Surgical History:   Procedure Laterality Date    COLONOSCOPY WITH CO2 INSUFFLATION N/A 1/18/2022    Procedure: COLONOSCOPY, WITH CO2 INSUFFLATION;  Surgeon: David Montgomery DO;  Location: [facility] OR    NO HISTORY OF SURGERY         ALLERGIES     Allergies   Allergen Reactions    Cats     Dogs     Dust Mites Other (See Comments)       FAMILY HISTORY:  Family History   Problem Relation Age of Onset    Diabetes Mother     Heart Disease Father     Musculoskeletal Disorder Father     Iritis Father        SOCIAL HISTORY:  Social History     Socioeconomic History     Marital status:      Spouse name: Myra    Number of children: 0    Years of education: Not on file    Highest education level: Not on file   Occupational History    Occupation:      Employer: [employer]     Comment: [employer]   Tobacco Use    Smoking status: Former     Packs/day: 1.00     Years: 20.00     Additional pack years: 0.00     Total pack years: 20.00     Types: Cigarettes     Start date: 9/15/1992     Quit date: 9/15/2012     Years since quittin.3    Smokeless tobacco: Never   Vaping Use    Vaping Use: Never used   Substance and Sexual Activity    Alcohol use: Yes     Comment: 3 to 4 drinks - 4 to 7 days a week    Drug use: No    Sexual activity: Yes     Partners: Female     Birth control/protection: None   Other Topics Concern    Parent/sibling w/ CABG, MI or angioplasty before 65F 55M? Yes   Social History Narrative    Not on file     Social Determinants of Health     Financial Resource Strain: Low Risk  (2023)    Financial Resource Strain     Within the past 12 months, have you or your family members you live with been unable to get utilities (heat, electricity) when it was really needed?: No   Food Insecurity: Low Risk  (2023)    Food Insecurity     Within the past 12 months, did you worry that your food would run out before you got money to buy more?: No     Within the past 12 months, did the food you bought just not last and you didn t have money to get more?: No   Transportation Needs: Low Risk  (2023)    Transportation Needs     Within the past 12 months, has lack of transportation kept you from medical appointments, getting your medicines, non-medical meetings or appointments, work, or from getting things that you need?: No   Physical Activity: Not on file   Stress: Not on file   Social Connections: Not on file   Interpersonal Safety: Low Risk  (2023)    Interpersonal Safety     Do you feel physically and emotionally safe where you  currently live?: Yes     Within the past 12 months, have you been hit, slapped, kicked or otherwise physically hurt by someone?: No     Within the past 12 months, have you been humiliated or emotionally abused in other ways by your partner or ex-partner?: No   Housing Stability: Low Risk  (12/14/2023)    Housing Stability     Do you have housing? : Yes     Are you worried about losing your housing?: No       ROS:   Constitutional: No fever, chills, or sweats. No weight gain/loss   ENT: No visual disturbance, ear ache, epistaxis, sore throat  Allergies/Immunologic: Negative.   Respiratory: No cough, hemoptysia  Cardiovascular: As per HPI  GI: No nausea, vomiting, hematemesis, melena, or hematochezia  : No urinary frequency, dysuria, or hematuria  Integument: Negative  Psychiatric: Negative  Neuro: Negative  Endocrinology: Negative   Musculoskeletal: Negative    ADDITIONAL COMMENTS:     I reviewed the patient's medications:     I reviewed the patient's pertinent clinical laboratory studies:     I reviewed the patient's pertinent imaging studies:   I reviewed the patient's ECG:

## 2024-01-05 ENCOUNTER — OFFICE VISIT (OUTPATIENT)
Dept: CARDIOLOGY | Facility: CLINIC | Age: 49
End: 2024-01-05
Attending: FAMILY MEDICINE
Payer: COMMERCIAL

## 2024-01-05 VITALS
OXYGEN SATURATION: 99 % | HEIGHT: 65 IN | HEART RATE: 100 BPM | SYSTOLIC BLOOD PRESSURE: 145 MMHG | BODY MASS INDEX: 27.52 KG/M2 | WEIGHT: 165.2 LBS | DIASTOLIC BLOOD PRESSURE: 80 MMHG

## 2024-01-05 DIAGNOSIS — E11.9 TYPE 2 DIABETES MELLITUS WITHOUT COMPLICATION, WITHOUT LONG-TERM CURRENT USE OF INSULIN (H): ICD-10-CM

## 2024-01-05 DIAGNOSIS — I25.10 CORONARY ARTERY DISEASE INVOLVING NATIVE CORONARY ARTERY OF NATIVE HEART WITHOUT ANGINA PECTORIS: Primary | ICD-10-CM

## 2024-01-05 DIAGNOSIS — E78.5 HYPERLIPIDEMIA LDL GOAL <70: ICD-10-CM

## 2024-01-05 DIAGNOSIS — Z82.49 FAMILY HISTORY OF EARLY CAD: ICD-10-CM

## 2024-01-05 DIAGNOSIS — I10 ESSENTIAL HYPERTENSION WITH GOAL BLOOD PRESSURE LESS THAN 140/90: ICD-10-CM

## 2024-01-05 PROCEDURE — 93000 ELECTROCARDIOGRAM COMPLETE: CPT | Performed by: INTERNAL MEDICINE

## 2024-01-05 PROCEDURE — 99204 OFFICE O/P NEW MOD 45 MIN: CPT | Mod: 25 | Performed by: INTERNAL MEDICINE

## 2024-01-05 RX ORDER — ATORVASTATIN CALCIUM 40 MG/1
40 TABLET, FILM COATED ORAL AT BEDTIME
Qty: 90 TABLET | Refills: 3 | Status: SHIPPED | OUTPATIENT
Start: 2024-01-05

## 2024-01-05 NOTE — NURSING NOTE
"Chief Complaint   Patient presents with    New Patient     Family history of early CAD, Type 2 diabetes mellitus without complication, without long-term current use of insulin (H), Hyperlipidemia LDL goal <70, Essential hypertension with goal blood pressure less than 140/90       Initial BP (!) 145/80 (BP Location: Right arm, Patient Position: Chair, Cuff Size: Adult Regular)   Pulse 100   Ht 1.651 m (5' 5\")   Wt 74.9 kg (165 lb 3.2 oz)   SpO2 99%   BMI 27.49 kg/m   Estimated body mass index is 27.49 kg/m  as calculated from the following:    Height as of this encounter: 1.651 m (5' 5\").    Weight as of this encounter: 74.9 kg (165 lb 3.2 oz)..  BP completed using cuff size: regular    Helen RAMIREZ, VF     "

## 2024-01-05 NOTE — PATIENT INSTRUCTIONS
Take your medicines every day, as directed     Changes made today:  Increased Lipitor to 40 mg  Sent referral to Nutritionist        Cardiology Care Coordinators:      Crystal SMALLS RN     Cardiology Rooming staff:  Helen BARBER CNA    Phone  [phone]      Fax [phone]    To Contact us     During Business Hours:  [phone]     If you are needing refills please contact your pharmacy.     For urgent after hour care please call the [facility] at [phone] or the [facility] at [phone] and ask to speak to the cardiologist on call.            HOW TO CHECK YOUR BLOOD PRESSURE AT HOME:     Avoid eating, smoking, and exercising for at least 30 minutes before taking a reading.     Be sure you have taken your BP medication at least 2-3 hours before you check it.      Sit quietly for 10 minutes before a reading.      Sit in a chair with your feet flat on the floor. Rest your  arm on a table so that the arm cuff is at the same level as your heart.     Remain still during the reading.  Record your blood pressure and pulse in a log and bring to your next appointment.       Use [employer] allows you to communicate directly with your heart team through secure messaging.  [employer] can be accessed any time on your phone, computer, or tablet.  If you need assistance, we'd be happy to help!             Keep your Heart Appointments:    Schedule echo   Labs in 2 months  No Follow up needed

## 2024-01-19 ENCOUNTER — VIRTUAL VISIT (OUTPATIENT)
Dept: NUTRITION | Facility: CLINIC | Age: 49
End: 2024-01-19
Attending: INTERNAL MEDICINE
Payer: COMMERCIAL

## 2024-01-19 DIAGNOSIS — E11.9 TYPE 2 DIABETES MELLITUS WITHOUT COMPLICATION, WITHOUT LONG-TERM CURRENT USE OF INSULIN (H): Primary | ICD-10-CM

## 2024-01-19 DIAGNOSIS — I25.10 CORONARY ARTERY DISEASE INVOLVING NATIVE CORONARY ARTERY OF NATIVE HEART WITHOUT ANGINA PECTORIS: ICD-10-CM

## 2024-01-19 PROCEDURE — 97802 MEDICAL NUTRITION INDIV IN: CPT | Mod: 95 | Performed by: DIETITIAN, REGISTERED

## 2024-01-19 NOTE — PROGRESS NOTES
Medical Nutrition Therapy  Visit Type: Initial assessment and intervention    Visit Details    How would you like to obtain your AVS? MyChart  If the correspondence for visit is dropped, how would you like your dietitian to reconnect with you:   call back by phone? Yes    Text to cell phone: [phone]  Will anyone else be joining your video visit or telephone call? No  {If patient encounters technical issues they should call [phone] :[phone]    Type of service:  Video Visit     Start Time: 12:46 PM    End Time:1:42 PM    Originating Location (pt. Location): Home    Distant Location (provider location):  [facility]     Platform used for Video Visit: [facility]      Referring Provider: Bishop Joy Josefa  [facility]     REASON FOR REFERRAL:   Fred Núñez is a 48 year old male who is interested in Medical Nutrition Therapy (MNT) and education related to high TG above 156, history of type 2 diabetes    He is accompanied by self.     Breakfast 8am: 3-4x per week donuts or breakfast bagel   Snack: 9:30-10:30am flavored yogurt   Lunch: ham or turkey sandwich with whole grains - bag of chips and diet soda   Snacks: Potato chips or candy   Dinner: Always around 5pm pancakes with shelton weekly, steak with egg noodles, take out chinese foods, spaghetti at least once per week    16oz of milk    NUTRITION ASSESSMENT:       1/18/2024     2:49 PM   Nutritional Goals   Nutritional Goal Create healthier eating patterns;Work on meal planning/recipes;Stabilize blood sugars            1/18/2024     2:49 PM   Neurological   Migraine Headaches Past               No data to display                    No data to display                     No data to display                   1/18/2024     2:49 PM   Endocrine   Type 2 diabetes Current           No data to display                   1/18/2024     2:49 PM   Cardiopulmonary   High blood pressure Current           No data to display                    No  data to display                    No data to display                    No data to display                 Past Medical History:  Past Medical History:   Diagnosis Date    CARDIOVASCULAR SCREENING; LDL GOAL LESS THAN 160     Diabetes (H)     Hypertension 10/10/2015    blood pressure monitored daily    Malignant hyperthermia     family history- father- pt has not had testing    Migraine headache        Previous Surgeries:   Past Surgical History:   Procedure Laterality Date    COLONOSCOPY WITH CO2 INSUFFLATION N/A 1/18/2022    Procedure: COLONOSCOPY, WITH CO2 INSUFFLATION;  Surgeon: David Montgomery, ;  Location: [facility] OR    NO HISTORY OF SURGERY          Family History:  Family History   Problem Relation Age of Onset    Diabetes Mother     Heart Disease Father     Musculoskeletal Disorder Father     Iritis Father         Lifestyle History:      1/18/2024     2:49 PM   Lifestyle   Do you feel your life is stressful right now?  No   Are you currently implementing any strategies to help manage stress? No        Exercise History:      1/18/2024     2:49 PM   Exercise   Does your occupation require extended periods of sitting?  Yes   Does your occupation require extended periods of repetitive movements (ex: walking or lifting)?  No   Do you currently participate in any forms of exercise? No   Rate your level of motivation for including exercise in your routine (0=none, 10=high): 4   Do you have any medical conditions, pain, injuries, surgeries etc. restricting you from exercise? No        Sleep History:      1/18/2024     2:49 PM   Sleep   How many hours (on average) do you sleep per night? 7-9   What time do you turn off the lights? 11 PM   How long does it take for you to fall asleep? 30-45 mins   What time do you stop using electronic devices? 10 PM   What time do you wake up? 6 AM   When do you eat your first meal?  10 AM   Do you feel well-rested during the day?  Yes   Do you take naps?  No   Do you  have a comfortable bedroom environment (cool, quiet, dark, etc)? Yes   Do you have a sleep routine/ ritual that you do before bed?  No   How many hours do you spend per day looking at a screen (TV, computer, tablet and phone)? 7 to 9   Select all factors that apply to your current sleep habits: Snore loudly or have been told you stop breathing;Drink coffee, soda or energy drinks after noon;Grinding teeth        Nutrition History:      1/18/2024     2:49 PM   Nutrition   Have you ever had a nutrition consultation? Yes   Do you currently follow a special diet or nutritional program? No   What do you feel are the biggest barriers getting in the way of achieving you nutritional goals? Lack of nutrition knowledge   Do you have any food allergies, sensitivities or intolerances?  No           1/18/2024     2:49 PM   Digestion   Do you experience stomach pains/cramping? Never   Do you experience bloating?  Never   Do you experience gas?  Never   Do you experience heartburn/acid reflux/indigestion? 2-3 times per week   How often do you have a bowel movement? 1 time per day   What is a typical bowel movement like for you? Select all that apply: Formed and soft          1/18/2024     2:49 PM   Food Access:    Who does the grocery shopping? Self   How often is grocery shopping done? Weekly   Where do you usually receive your groceries from? Select all that apply: [employer]   Do you read food labels? Yes   What do you look at?  Calories   Who does the cooking? Select all that apply: Self;Spouse/Partner   How many meals do you eat out per week?  3 to 5   What restaurants do you typically choose? Fast Casual (Chipotle, Panera, etc.)          1/18/2024     2:49 PM   Daily Patterns:   How many days per week do you have breakfast? 4   How many days per week do you have lunch? 7   How many days per week do you have dinner? 7   How many days per week do you have snacks? 7          1/18/2024     2:49 PM   Protein Intake:   How  many times per day do you typically consume a protein source(s)? 3   What types of protein do you currently eat?  Ground Beef;Beef Ribs;Steak;Hamburgers;Beef Roast;Haas/Rice Haas;Roast Ham;Deli Ham;Sausage;Other Pork;Chicken Breast;Chicken Thighs/Legs;Turkey Breast;Deli Turkey;Eggs           1/18/2024     2:49 PM   Fat Intake:    How many times per day do you typically consume healthy fat(s)? 2   What types of health fats do you currently eat? Select all that apply:  Other seeds;Peanut butter;Butter;Salad dressings           1/18/2024     2:49 PM   Fruit Intake:    How many times per day do you typically consume fruits? 1   What types of fruit do currently eat? Apple;Banana;Grapes;Pears           1/18/2024     2:49 PM   Vegetable Intake:    How many times per day do you typically consume vegetables? 0   What types of vegetables do you currently eat? Carrots;Corn;Peas;Potato (baked, boiled, mashed, French fries);Spinach          1/18/2024     2:49 PM   Grain Intake:    How many times per day do you typically consume grains? 1   What types of grains do currently eat? Select all that apply:  Breads (non-gluten free);Crackers (non-gluten free);Pancakes/waffles (non-gluten free);Pasta (non-gluten free);Pretzels (non-gluten free)           1/18/2024     2:49 PM   Dairy Intake:    How many times per day do you typically consume dairy? 3   What types of dairy do currently eat? Select all that apply:  Milk;Cheese;Yogurt           1/18/2024     2:49 PM   Non-Dairy Alternative Intake:    How many times per day do you typically consume non-dairy alternatives? 0   What types of non-dairy alternatives do currently eat? Select all that apply:  Other cheese           1/18/2024     2:49 PM   Sweets Intake:    How many times per day do you typically consume sweets? 2          1/18/2024     2:49 PM   Beverage Intake:    How many 8 oz cups of water do you typically consume per day?  2 to 3   How many 8 oz cups of caffeine do you  typically consume per day?  5 to 6   How many drinks of alcohol do you typically consume per week (1 drink = 5 oz wine, 12 oz beer, 1.5 oz spirits)?   4 to 7           1/18/2024     2:49 PM   Lifestyle Recall:    What time did you wake up? 6 AM   What time did you go to sleep? 11 PM   What time did you have breakfast? 8-9 AM   Where did you have breakfast?  Work   What time did you have a morning snack? 8-9 AM   Where did you have your morning snack? Work   What time did you have lunch? 11AM-12 PM   Where did you have lunch?  Work   What time did you have an afternoon snack? 2-3 PM   Where did you have your afternoon snack? Work   What time did you have dinner? 4-5 PM   Where did you have dinner?  Home   What time did you have an evening snack? 8-9 PM   Where did you have your evening snack? Home   What time of day did you exercise? 6 PM   Where did you exercise? Other          Additional concerns:    MEDICATIONS:  Current Outpatient Medications   Medication Sig Dispense Refill    amLODIPine (NORVASC) 5 MG tablet Take 1 tablet (5 mg) by mouth daily 90 tablet 1    atorvastatin (LIPITOR) 40 MG tablet Take 1 tablet (40 mg) by mouth at bedtime 90 tablet 3    lisinopril (ZESTRIL) 5 MG tablet Take 1 tablet (5 mg) by mouth daily 90 tablet 1    Magnesium-Potassium 250-100 MG TABS       metFORMIN (GLUCOPHAGE) 500 MG tablet Take 1 tablet (500 mg) by mouth daily (with dinner) 90 tablet 1    methocarbamol (ROBAXIN) 500 MG tablet Take 1 tablet (500 mg) by mouth 4 times daily as needed for muscle spasms 30 tablet 0    methylPREDNISolone (MEDROL DOSEPAK) 4 MG tablet therapy pack Follow Package Directions 21 tablet 0    multivitamin w/minerals (THERA-VIT-M) tablet Take 1 tablet by mouth daily      OMEPRAZOLE PO Take 20 mg by mouth daily      order for DME Equipment being ordered: Blood pressure monitor.  Check blood pressure every morning.  Goal blood pressure: systolic less than 125; diastolic blood pressure less than 75. 1 each 0     triamterene-HCTZ (DYAZIDE) 37.5-25 MG capsule TAKE 1 CAPSULE DAILY (NEED FOLLOW UP VISIT FOR ANY FURTHER REFILL) 90 capsule 1           1/18/2024     2:49 PM   Dietary Supplements   Individual Vitamin Supplements General multivitamin   Individual Mineral Supplements Magnesium;Potassium   Herbal Complimentary products Other         ALLERGIES:   Allergies   Allergen Reactions    Cats     Dogs     Dust Mites Other (See Comments)        .na  LABS:  Last Basic Metabolic Panel:  Lab Results   Component Value Date     02/09/2023     05/19/2022     10/05/2021     01/14/2021     12/19/2019     05/16/2019      Lab Results   Component Value Date    POTASSIUM 4.4 02/09/2023    POTASSIUM 4.1 05/19/2022    POTASSIUM 4.2 10/05/2021    POTASSIUM 4.3 01/14/2021    POTASSIUM 4.0 12/19/2019    POTASSIUM 4.1 05/16/2019     Lab Results   Component Value Date    CHLORIDE 102 02/09/2023    CHLORIDE 105 05/19/2022    CHLORIDE 104 10/05/2021    CHLORIDE 106 01/14/2021    CHLORIDE 105 12/19/2019    CHLORIDE 102 05/16/2019     Lab Results   Component Value Date    PAXTON 9.3 02/09/2023    PAXTON 9.2 05/19/2022    PAXTON 9.4 10/05/2021    PAXTON 9.5 01/14/2021    PAXTON 9.1 12/19/2019    PAXTON 8.9 05/16/2019     Lab Results   Component Value Date    CO2 29 02/09/2023    CO2 26 05/19/2022    CO2 29 10/05/2021    CO2 30 01/14/2021    CO2 28 12/19/2019    CO2 26 05/16/2019     Lab Results   Component Value Date    BUN 15 02/09/2023    BUN 11 05/19/2022    BUN 14 10/05/2021    BUN 15 01/14/2021    BUN 15 12/19/2019    BUN 14 05/16/2019     Lab Results   Component Value Date    CR 0.85 02/09/2023    CR 0.80 05/19/2022    CR 0.92 10/05/2021    CR 0.88 01/14/2021    CR 0.79 12/19/2019    CR 0.91 05/16/2019     Lab Results   Component Value Date     02/09/2023     05/19/2022     01/18/2022     01/18/2022     10/05/2021     01/14/2021    GLC 94 12/19/2019    GLC 87 05/16/2019       Last  Glucose Profile:   Hemoglobin A1C   Date Value Ref Range Status   09/14/2023 6.3 (H) 0.0 - 5.6 % Final     Comment:     Normal <5.7%   Prediabetes 5.7-6.4%    Diabetes 6.5% or higher     Note: Adopted from ADA consensus guidelines.   02/09/2023 5.9 (H) 0.0 - 5.6 % Final     Comment:     Normal <5.7%   Prediabetes 5.7-6.4%    Diabetes 6.5% or higher     Note: Adopted from ADA consensus guidelines.   05/19/2022 5.7 (H) 0.0 - 5.6 % Final     Comment:     Normal <5.7%   Prediabetes 5.7-6.4%    Diabetes 6.5% or higher     Note: Adopted from ADA consensus guidelines.   01/14/2021 5.9 (H) 0 - 5.6 % Final     Comment:     Normal <5.7% Prediabetes 5.7-6.4%  Diabetes 6.5% or higher - adopted from ADA   consensus guidelines.     12/19/2019 5.7 (H) 0 - 5.6 % Final     Comment:     Normal <5.7% Prediabetes 5.7-6.4%  Diabetes 6.5% or higher - adopted from ADA   consensus guidelines.     05/16/2019 5.8 (H) 0 - 5.6 % Final     Comment:     Normal <5.7% Prediabetes 5.7-6.4%  Diabetes 6.5% or higher - adopted from ADA   consensus guidelines.         Last Lipid Profile:   Cholesterol   Date Value Ref Range Status   02/09/2023 153 <200 mg/dL Final   05/19/2022 129 <200 mg/dL Final   01/14/2021 157 <200 mg/dL Final   12/19/2019 155 <200 mg/dL Final   11/08/2018 173 <200 mg/dL Final     HDL Cholesterol   Date Value Ref Range Status   01/14/2021 48 >39 mg/dL Final   12/19/2019 44 >39 mg/dL Final   11/08/2018 45 >39 mg/dL Final     Direct Measure HDL   Date Value Ref Range Status   02/09/2023 56 >=40 mg/dL Final   05/19/2022 45 >=40 mg/dL Final     LDL Cholesterol Calculated   Date Value Ref Range Status   02/09/2023 70 <=100 mg/dL Final   05/19/2022 53 <=100 mg/dL Final   01/14/2021 80 <100 mg/dL Final     Comment:     Desirable:       <100 mg/dl   12/19/2019 68 <100 mg/dL Final     Comment:     Desirable:       <100 mg/dl   11/08/2018 73 <100 mg/dL Final     Comment:     Desirable:       <100 mg/dl     Triglycerides   Date Value Ref  "Range Status   02/09/2023 136 <150 mg/dL Final   05/19/2022 156 (H) <150 mg/dL Final   01/14/2021 147 <150 mg/dL Final     Comment:     Fasting specimen   12/19/2019 215 (H) <150 mg/dL Final     Comment:     Borderline high:  150-199 mg/dl  High:             200-499 mg/dl  Very high:       >499 mg/dl  Fasting specimen     11/08/2018 276 (H) <150 mg/dL Final     Comment:     Borderline high:  150-199 mg/dl  High:             200-499 mg/dl  Very high:       >499 mg/dl  Fasting specimen       No results found for: \"CHOLHDLRATIO\"    Most recent CBC:  No lab results found.  Most recent hepatic panel:  Recent Labs   Lab Test 02/09/23  1118 05/19/22  0906   ALT 38 51   AST 17 27     Most recent creatinine:  Recent Labs   Lab Test 02/09/23  1118 05/19/22  0906   CR 0.85 0.80       No components found for: \"GFRESETIMATED\"LASTLAB(gfrestblack:1@  Lab Results   Component Value Date    ALBUMIN 4.2 02/09/2023    ALBUMIN 4.2 01/14/2021       Last Thyroid Profile:   TSH   Date Value Ref Range Status   12/19/2019 1.26 0.40 - 4.00 mU/L Final   11/08/2018 2.83 0.40 - 4.00 mU/L Final   12/08/2016 2.24 0.40 - 4.00 mU/L Final       Last Mineral Profile:   No results found for: \"ZARI\", \"IRON\", \"FEB\"    Autoimmune & Inflammatory   No results found for: \"CRP\"      Last Vitamin Profile:   No results found for: \"DVZ642\", \"KZNR122\", \"TMUS97SRRZJ\", \"VITD3\", \"D2VIT\", \"D3VIT\", \"DTOT\", \"TA88133711\", \"[id number]\", \"[id number]\", \"[id number]\", \"[id number]\", \"[id number]\"    ANTHROPOMETRICS:  Vitals:   BP Readings from Last 1 Encounters:   01/05/24 (!) 145/80     Pulse Readings from Last 1 Encounters:   01/05/24 100     Estimated body mass index is 27.49 kg/m  as calculated from the following:    Height as of 1/5/24: 1.651 m (5' 5\").    Weight as of 1/5/24: 74.9 kg (165 lb 3.2 oz).    Wt Readings from Last 5 Encounters:   01/05/24 74.9 kg (165 lb 3.2 oz)   12/14/23 73.9 kg (163 lb)   09/12/21 76.4 kg (168 lb 6 oz)   01/07/20 77.6 kg (171 lb)   09/06/19 " 76.7 kg (169 lb)     NUTRITION DIAGNOSIS:   1. Altered nutrition-related laboratory values related to endocrine,cardiac dysfunction as evidenced by elevated BMI, HbA1c 6.3% and glucose 131 mg/dL.       NUTRITION INTERVENTION:   Nutrition Education (Application):  Cardiometabolic Food Plan Materials by [employer] [place] [employer]     Discussed healthy eating patters, portion sizes, whole grains, fats, protein & fiber    Reviewed labels focusing on carb, sugar and fiber servings    Reviewed and planned out some recipes and brands to try     Long Term Goals:    Goal: Decrease fasting blood sugars to  70-99 mg/dL within 1-6 months  Goal: Decrease Hemoglobin A1c <6 % within 2-6 months   Goal: Lose 20-24lbs in 6 months, recommend average 1-2lbs per week of weight loss.             Short Term Goals:  Goal 1: Focus on having a healthy balance breakfast daily for the next six weeks.   Incorporate a whole grain at breakfast ex oatmeal naturally gluten free with more healthy fats such as handful of walnuts and 1 fruit serving 15g of carbs such as blueberries. Try a lean protein on side such as 2 eggs, 2 turkey or chicken sausage.    Try a smoothie 1-2x a week for breakfast for the next six weeks see recipe discussed. add in unsweetened flax, hemp or coconut milk, 1 tbsp of healthy fat such as flax seed ground or rafael seed ground. serving of fruit 1 cup of berries. Veggie (optional), 1 scoop of plant based protein powder or collagen powder, 1 serving of fruit and veggie such as kale, spinach, or veggie powder even spirulina     Avocado on slice of gluten free bread recommend base Plethora Technology brand      [email or web address].Covalent [email or web address]     Birch melchor brand for pancakes - paleo  https://WUT/collections/pancake-waffle-mix/products/paleo     Try siete soft taco shells with eggs and veggies in am for breakfast    https://Boutir.Pixel Qi/collections/tortillas/products/ljnntu-ozteu-vjauxwqmz-6-pack    Hayde miner - at [employer]     Purely nellie colunga      Goal 2:  Try to have 1 snack daily between meals that includes 1 fruit serving 15g of carbs and healthy fat and or lean protein for the next eight weeks.   - see meal plan and recipe ideas in the cardi metabolic guides provided.     2 hard boiled eggs.   Greek Yogurt with Blackberries  Fresh Yellow Pear or veggie (radishes, bell peppers, baby cucumbers, carrots or sugar snap peas) with Hummus - try the mini to go packs to help with portion control   Marinated Olives* and Kefir   Purple Plum with Mixed Nuts  Celery with [place] Butter  Dark Chocolate, 70% or higher Cocoa and Pistachio Nuts  Banana with almond butter or sunflower seed butter   Apple with peanut butter topped with cinnamon   Baby cucumber or carrots, bell pepper, radishes or sugar snap peas with individual servings of guac   Low sodium cottage with peaches or mandarin oranges   Sunflower seeds with strawberries   Turkey jerky or 100% grass fed beef jerky   Plant based protein bar   Simple Mills Macon Flour Crackers or Ysabel Gone Cracker dip with hummus or guac   Avocado on slice of gluten free bread recommend base culture brand or 100% whole grain bread    Healthy Late-Night Snacks     Berries with nuts  Bananas nut butter   Protein Shakes  Greek Yogurt - optional top with nuts/grain free granola and fruit   Pumpkin Seeds - with fruit or veggie   Whole Grain GF Crackers and Macon Butter or hummus or guac   70-80%   Dark Chocolate    Protein Snack Ideas  Perfect for in-between meals if you get a little hungry (or hangry)    Bone broth  Wali pudding   Chocolate avocado mousse with collagen   Chicken or turkey sausage  Coconut yogurt or kefir with collagen peptides  Collagen protein  balls  (nutbutter, collagen, cacao nibs, coconut flour)  Grassfed Greek yogurt or cottage cheese (only if dairy is  tolerated)  Jerky (grass-fed beef or turkey)   Hardboiled eggs or scrambled eggs  Leftover protein (from lunch or dinner)  Meatballs or mini-sliders (turkey, bison, beef)  Organic turkey pepperoni   Protein powder (bone broth protein powder, grass-fed whey if tolerated)- shake up in water or unsweetened almond milk  Tuna salad with paleo perry  Turkey, ham or roast beef rollups  Turkey shelton wrapped green beans or asparagus      Emergency Boosters  Perfect for a quick  pick-me-up  if you have an  emergency  blood sugar dip and you re planning to eat a protein meal soon.     Bone broth  Coconut water   Half banana or handful of berries  Pure juice (carrot, celery, beet, or green juice with apple)   Protein powder--shake up in water  Raw honey/manuka honey (Bonus: 1 tsp Manuka or raw honey + pinch of sea salt + optional 1 tsp coconut oil)   Smart Sweets  paleo gummies     Goal 3: Monitor portion sizes - measure out food portions for the next six weeks to gain a better understanding of what a serving size actually is.  Specifically your rice portions. Swap for quinoa or cauliflower rice to help with the transition and follow more gluten free foods - review handout for portions   - Consider just having 2 serving of grains per day (30g) and swap for lower carb/sugar options such as non-starchy veggies.   - Read labels for low sugar and carbs.  triglycerides and fat in the liver and create blood sugar imbalances leading to weight gain.      Goal 4: Recommend the following supplements to start   One Multivitamin by Pure encapsulations - 1 capsule per day with food   Nordic Naturals Plus vitamin D - 1-2 capsules daily with food ----https://www.Opicos.com/consumers/ultimate-omega-d3     Look on amazon to find the supplement recommendations or talk with the [facility] to special order. They may be able to offer a discount as well.         Mediterranean Approach: Eat whole, unprocessed real foods in  their unprocessed forms such as fruits, vegetables, whole grains (prefer gluten free), nuts, legumes, extra virgin olive oil, spices, modest amounts of poultry, and fish.      Avoid inflammatory foods: Eliminate gluten found in wheat, barley, rye, oats, kamut, and spelt for at least 3 weeks to identify any hidden reaction. Gluten sensitivity or allergy can cause many different types of symptoms form migraines to fatigue to weight gain.  If symptoms go away this is a clue you may be reactive to gluten.  Dairy can also be inflammatory consider eliminating for 3 weeks as well. The proteins like casein and whey in dairy can irritate and inflame your gut. Also the sugar lactase in dairy can cause digestive issues in addition to blood sugar spikes.       Cardiometabolic Food Plan: 7990-2335 calories per day   Protein 9-10 servings per day  - include at each meal to stabilize blood sugars   (Choose 3oz or 21g per meal and aim for 1oz of 7g for snacks)   Strive for 1-2 servings of fish per week especially of higher omega-3 fatty acid containing fish such as salmon.   Legumes 1-2 serving per day   Dairy alternatives 2-3 serving per day   Nuts and seeds 2-3 servings per day - great to incorporate as snacks   Fats and oils 4 servings per day   Non starchy vegetables 7-8 servings per day   Starchy vegetable limit 1 serving per day as they tend to impact blood sugar (they are moderate-GI).   Fruits 2 servings per day - best to couple with a little bit of protein or fat to offset a rise in blood sugars (they are low-moderate-GI foods).   Whole grains 1-2 serving per day - try gluten free whole grains for more variety        Incorporate protein powder daily:  Plant based hemp (recommended brands: [place] Seltzer, Nutiva, Just Hemp Protein, Modacruz Red Mill)    Plant based pea (recommended brands: Naked Pea, Now Sports). If you want to try a combo of pea and hemp the brand Cheng in vanilla or chocolate is a great option.   Try Bone  broth protein powder or collagen peptides in liquid bone broth, vegetable broth or 12 oz of water as snack. The bone broth powder and collagen can be used for soups as well. This can help provide essential amino acids and minerals that heal your gut as well as balance blood sugars. A great option if you have a hard time tolerating solids.     Can also consider pre made shakes if unable to make smoothies.      Brand examples that are gluten and dairy free to try:   1) Orgain Vegan Protein Shakes, 20g of Plant Based Protein, Creamy Chocolate - Gluten Free, No Dairy, Soy, or Preservatives, No Added Sugar  2) Pirq, Vegan Protein Shake, Turmeric Curcumin, Laura, Plant-Based Protein Drink, Gluten-Free, Dairy-Free, Soy-Free, Non-GMO, Vegetarian, Kosher, Keto, Low Carb, Low Calorie  3) OWYN Pro Elite Vegan Plant-Based High Protein Shake, 35g Protein, 9 Amino Acids, Omega-3, Prebiotics, Superfoods Greens for Workout and Recovery, 0g Net Carbs, Zero Sugar, Keto  4) Ripple Vegan Protein Shake  Chocolate  20g Nutritious Plant Based Pea Protein  Shelf Stable  No GMOs, Soy, Nut, Gluten, Lactose   5) Forte Netservices Glucose Support 1.2 Plant based, dairy free, low glycemic index  https://Blueleaf.iCeutica/products/glucose-support-1-2-vanilla    Choose Low Glycemic (GI) foods: Regulate your sugar levels by eating foods that do not spike blood sugars.  Eat low -GI foods so only small fluctuations in blood glucose and insulin levels are produced.      Examples of low-GI foods: nuts, seeds, GF oats, most vegetables especially non-starchy and fruits.     Medium or high-GI foods should be eaten with a protein or fat, both of which blunt the glycemic effect of these foods. This reduces the overall glycemic impact of a meal.   Ex: Most grains and starchy veggies are medium/high GI.     Avoid foods containing refined sugars, artificial sweeteners, and refined grains they are considered high-GI because they lead to sharp increases in blood  sugars levels, which increase insulin sensitivity causing increased TG, and low good cholesterol (HDL).   Ex: cakes, cookies, pies, bread, sodas, fruit drinks, presweetened tea, coffee drinks, energy or sport drinks, flavored milk and other processed foods.     Choose foods high in fiber: Aim for at least 5 grams of fiber per serving of food or a total of 25-35 grams fiber per day. Remember, when looking at the label, you can take the fiber away from the total carbs. Ex:15g of total carbs - 4g of fiber = 11g net carbs     Insoluble fiber acts like a bulky  inner broom,  sweeping out debris from the intestine and creating more motility and movement.      Soluble fiber attracts water and swells, creating a gel that slows digestion.  Also, slows the release of glucose from foods into the blood which stops spikes in blood sugar levels.  Soluble fiber traps toxins in the gut, helping to carry them to excretion and provides healthy bacteria in the digestive tract.     Choose High Quality Fats: Adding anti-inflammatory fats into your diet such as fish (salmon, herring, mackerel, and sardines), omega 3 eggs, rafael seeds, ground flax seeds/milk, hemp seeds/milk, walnuts and some other certain leafy greens will increase omega-3 fats to omeaga-6 fats ratio.     Therapeutic fats both monounsaturated and polyunsaturated to include daily: ground flaxseeds, unsalted mixed nuts, avocados, olives, extra-virgin olive oil.     Emphasize high-quality oils and fats in the diet daily such as avocado oil, coconut oil, flaxseed, olive, sesame. Ex: 1 tsp to 1 tbsp of MCT oil from coconuts can be added into coffee, smoothies, and salad dressings per day.   Avoid trans fats found in processed foods     Drink more water. Hydration is critical, so drink at least six to eight glasses of water a day. Drink more water between meals and less at meals.   Try adding herbal teas (sugar free) or lemon/lime/cucumber/fruit to water for flavor. Avoid  artificial sweetener packets to flavor your water.   Cut back on coffee switch to green tea. Avoid adding sugar and milk to coffee instead use dairy alternatives such as almond, flax, coconut milk.   Try another coffee substitute such as   -https://Gift Card Impressions.Personally/  -https://Kunerangor.appMobi      NUTRITION RESOURCES:  IFM Cardiometabolic Packet   Comprehensive Guide  Meal plan with recipes     Meal delivery programs to review:   https://TopPatch.appMobi/diabetic-meal-delivery  https://www.Combined Power  https://www.skyrockit/plans  https://www.KoolSpan.appMobi/    PATIENT'S BEHAVIOR CHANGE GOALS:   See nutrition intervention for patient stated behavior change goals.     MONITOR / EVALUATE:  Registered Dietitian will monitor/evaluate the following:   Beliefs and attitudes related to food  Food and nutrition knowledge / skills  Food / Beverage / Nutrient intake   Pertinent Labs  Progress toward meeting stated nutrition-related goals  Readiness to change nutrition-related behaviors  Weight change  Digestion     COORDINATION OF CARE:  Follow up with primary care provider       FOLLOW-UP:  Follow-up appointment scheduled on March 29th at 3pm     Time spent in minutes: 55 minutes 3 units   Encounter: Individual    Chari Gutierrez RD, CLT, LD  Integrative Registered Dietitian

## 2024-01-23 NOTE — PATIENT INSTRUCTIONS
NUTRITION INTERVENTION:   Nutrition Education (Application):  Cardiometabolic Food Plan Materials by [employer]     Discussed healthy eating patters, portion sizes, whole grains, fats, protein & fiber    Reviewed labels focusing on carb, sugar and fiber servings    Reviewed and planned out some recipes and brands to try     Long Term Goals:    Goal: Decrease fasting blood sugars to  70-99 mg/dL within 1-6 months  Goal: Decrease Hemoglobin A1c <6 % within 2-6 months   Goal: Lose 20-24lbs in 6 months, recommend average 1-2lbs per week of weight loss.             Short Term Goals:  Goal 1: Focus on having a healthy balance breakfast daily for the next six weeks.   Incorporate a whole grain at breakfast ex oatmeal naturally gluten free with more healthy fats such as handful of walnuts and 1 fruit serving 15g of carbs such as blueberries. Try a lean protein on side such as 2 eggs, 2 turkey or chicken sausage.    Try a smoothie 1-2x a week for breakfast for the next six weeks see recipe discussed. add in unsweetened flax, hemp or coconut milk, 1 tbsp of healthy fat such as flax seed ground or rafael seed ground. serving of fruit 1 cup of berries. Veggie (optional), 1 scoop of plant based protein powder or collagen powder, 1 serving of fruit and veggie such as kale, spinach, or veggie powder even spirulina     Avocado on slice of gluten free bread recommend base culture brand      [email or web address]/?[email or web address]     Birch melchor brand for pancakes - paleo  https://Innotas/collections/pancake-waffle-mix/products/paleo     Try siete soft taco shells with eggs and veggies in am for breakfast   https://Tebla/collections/tortillas/products/ttznlg-fixpl-bbwymulmu-6-pack    Banza noodles - at cost co     Purely nellie colunga      Goal 2:  Try to have 1 snack daily between meals that includes 1  fruit serving 15g of carbs and healthy fat and or lean protein for the next eight weeks.   - see meal plan and recipe ideas in the cardi metabolic guides provided.     2 hard boiled eggs.   Greek Yogurt with Blackberries  Fresh Yellow Pear or veggie (radishes, bell peppers, baby cucumbers, carrots or sugar snap peas) with Hummus - try the mini to go packs to help with portion control   Marinated Olives* and Kefir   Purple Plum with Mixed Nuts  Celery with Moline Butter  Dark Chocolate, 70% or higher Cocoa and Pistachio Nuts  Banana with almond butter or sunflower seed butter   Apple with peanut butter topped with cinnamon   Baby cucumber or carrots, bell pepper, radishes or sugar snap peas with individual servings of guac   Low sodium cottage with peaches or mandarin oranges   Sunflower seeds with strawberries   Turkey jerky or 100% grass fed beef jerky   Plant based protein bar   Simple Mills Moline Flour Crackers or Ysabel Gone Cracker dip with hummus or guac   Avocado on slice of gluten free bread recommend base culture brand or 100% whole grain bread    Healthy Late-Night Snacks     Berries with nuts  Bananas nut butter   Protein Shakes  Greek Yogurt - optional top with nuts/grain free granola and fruit   Pumpkin Seeds - with fruit or veggie   Whole Grain GF Crackers and Moline Butter or hummus or guac   70-80%   Dark Chocolate    Protein Snack Ideas  Perfect for in-between meals if you get a little hungry (or hangry)    Bone broth  Wali pudding   Chocolate avocado mousse with collagen   Chicken or turkey sausage  Coconut yogurt or kefir with collagen peptides  Collagen protein  balls  (nutbutter, collagen, cacao nibs, coconut flour)  Grassfed Greek yogurt or cottage cheese (only if dairy is tolerated)  Jerky (grass-fed beef or turkey)   Hardboiled eggs or scrambled eggs  Leftover protein (from lunch or dinner)  Meatballs or mini-sliders (turkey, bison, beef)  Organic turkey pepperoni   Protein powder (bone broth  protein powder, grass-fed whey if tolerated)- shake up in water or unsweetened almond milk  Tuna salad with paleo perry  Turkey, ham or roast beef rollups  Turkey shelton wrapped green beans or asparagus      Emergency Boosters  Perfect for a quick  pick-me-up  if you have an  emergency  blood sugar dip and you re planning to eat a protein meal soon.     Bone broth  Coconut water   Half banana or handful of berries  Pure juice (carrot, celery, beet, or green juice with apple)   Protein powder--shake up in water  Raw honey/manuka honey (Bonus: 1 tsp Manuka or raw honey + pinch of sea salt + optional 1 tsp coconut oil)   Smart Sweets  paleo gummies     Goal 3: Monitor portion sizes - measure out food portions for the next six weeks to gain a better understanding of what a serving size actually is.  Specifically your rice portions. Swap for quinoa or cauliflower rice to help with the transition and follow more gluten free foods - review handout for portions   - Consider just having 2 serving of grains per day (30g) and swap for lower carb/sugar options such as non-starchy veggies.   - Read labels for low sugar and carbs.  triglycerides and fat in the liver and create blood sugar imbalances leading to weight gain.      Goal 4: Recommend the following supplements to start   One Multivitamin by Pure encapsulations - 1 capsule per day with food   Nordic Naturals Plus vitamin D - 1-2 capsules daily with food ----https://www.Physicians Interactive.IROCKE/consumers/ultimate-omega-d3     Look on amazon to find the supplement recommendations or talk with the [facility] to special order. They may be able to offer a discount as well.         Mediterranean Approach: Eat whole, unprocessed real foods in their unprocessed forms such as fruits, vegetables, whole grains (prefer gluten free), nuts, legumes, extra virgin olive oil, spices, modest amounts of poultry, and fish.      Avoid inflammatory foods: Eliminate gluten found  in wheat, barley, rye, oats, kamut, and spelt for at least 3 weeks to identify any hidden reaction. Gluten sensitivity or allergy can cause many different types of symptoms form migraines to fatigue to weight gain.  If symptoms go away this is a clue you may be reactive to gluten.  Dairy can also be inflammatory consider eliminating for 3 weeks as well. The proteins like casein and whey in dairy can irritate and inflame your gut. Also the sugar lactase in dairy can cause digestive issues in addition to blood sugar spikes.     Cardiometabolic Food Plan: 5418-8320 calories per day   Protein 7-9 servings per day  - include at each meal to stabilize blood sugars   (Choose 3oz or 21g per meal and aim for 1oz of 7g for snacks)   Strive for 1-2 servings of fish per week especially of higher omega-3 fatty acid containing fish such as salmon.   Legumes 1-2 serving per day   Dairy alternatives 1 serving per day   Nuts and seeds 2-4 servings per day - great to incorporate as snacks   Fats and oils 4 servings per day   Non starchy vegetables 7-8 servings per day   Starchy vegetable limit 1 serving per day as they tend to impact blood sugar (they are moderate-GI).   Fruits 2 servings per day - best to couple with a little bit of protein or fat to offset a rise in blood sugars (they are low-moderate-GI foods).   Whole grains 1-2 serving per day - try gluten free whole grains for more variety      Incorporate protein powder daily:  Plant based hemp (recommended brands: [place] Kinta, Nutiva, Just Hemp Protein, Pascual's Red Mill)    Plant based pea (recommended brands: Naked Pea, Now Sports). If you want to try a combo of pea and hemp the brand Cheng in vanilla or chocolate is a great option.   Try Bone broth protein powder or collagen peptides in liquid bone broth, vegetable broth or 12 oz of water as snack. The bone broth powder and collagen can be used for soups as well. This can help provide essential amino acids and minerals  that heal your gut as well as balance blood sugars. A great option if you have a hard time tolerating solids.     Can also consider pre made shakes if unable to make smoothies.      Brand examples that are gluten and dairy free to try:   1) Orgain Vegan Protein Shakes, 20g of Plant Based Protein, Creamy Chocolate - Gluten Free, No Dairy, Soy, or Preservatives, No Added Sugar  2) Pirq, Vegan Protein Shake, Turmeric Curcumin, Laura, Plant-Based Protein Drink, Gluten-Free, Dairy-Free, Soy-Free, Non-GMO, Vegetarian, Kosher, Keto, Low Carb, Low Calorie  3) OWYN Pro Elite Vegan Plant-Based High Protein Shake, 35g Protein, 9 Amino Acids, Omega-3, Prebiotics, Superfoods Greens for Workout and Recovery, 0g Net Carbs, Zero Sugar, Keto  4) Ripple Vegan Protein Shake  Chocolate  20g Nutritious Plant Based Pea Protein  Shelf Stable  No GMOs, Soy, Nut, Gluten, Lactose   5) Myndnet Glucose Support 1.2 Plant based, dairy free, low glycemic index  https://Holganix/products/glucose-support-1-2-vanilla    Choose Low Glycemic (GI) foods: Regulate your sugar levels by eating foods that do not spike blood sugars.  Eat low -GI foods so only small fluctuations in blood glucose and insulin levels are produced.      Examples of low-GI foods: nuts, seeds, GF oats, most vegetables especially non-starchy and fruits.     Medium or high-GI foods should be eaten with a protein or fat, both of which blunt the glycemic effect of these foods. This reduces the overall glycemic impact of a meal.   Ex: Most grains and starchy veggies are medium/high GI.     Avoid foods containing refined sugars, artificial sweeteners, and refined grains they are considered high-GI because they lead to sharp increases in blood sugars levels, which increase insulin sensitivity causing increased TG, and low good cholesterol (HDL).   Ex: cakes, cookies, pies, bread, sodas, fruit drinks, presweetened tea, coffee drinks, energy or sport drinks, flavored milk  and other processed foods.     Choose foods high in fiber: Aim for at least 5 grams of fiber per serving of food or a total of 25-35 grams fiber per day. Remember, when looking at the label, you can take the fiber away from the total carbs. Ex:15g of total carbs - 4g of fiber = 11g net carbs     Insoluble fiber acts like a bulky  inner broom,  sweeping out debris from the intestine and creating more motility and movement.      Soluble fiber attracts water and swells, creating a gel that slows digestion.  Also, slows the release of glucose from foods into the blood which stops spikes in blood sugar levels.  Soluble fiber traps toxins in the gut, helping to carry them to excretion and provides healthy bacteria in the digestive tract.     Choose High Quality Fats: Adding anti-inflammatory fats into your diet such as fish (salmon, herring, mackerel, and sardines), omega 3 eggs, rafael seeds, ground flax seeds/milk, hemp seeds/milk, walnuts and some other certain leafy greens will increase omega-3 fats to omeaga-6 fats ratio.     Therapeutic fats both monounsaturated and polyunsaturated to include daily: ground flaxseeds, unsalted mixed nuts, avocados, olives, extra-virgin olive oil.     Emphasize high-quality oils and fats in the diet daily such as avocado oil, coconut oil, flaxseed, olive, sesame. Ex: 1 tsp to 1 tbsp of MCT oil from coconuts can be added into coffee, smoothies, and salad dressings per day.   Avoid trans fats found in processed foods     Drink more water. Hydration is critical, so drink at least six to eight glasses of water a day. Drink more water between meals and less at meals.   Try adding herbal teas (sugar free) or lemon/lime/cucumber/fruit to water for flavor. Avoid artificial sweetener packets to flavor your water.   Cut back on coffee switch to green tea. Avoid adding sugar and milk to coffee instead use dairy alternatives such as almond, flax, coconut milk.   Try another coffee substitute such  as   -https://EPV SOLAR.Tracab/  -https://Planet Payment.Casa Couture      NUTRITION RESOURCES:  IFM Cardiometabolic Packet   Comprehensive Guide  Meal plan with recipes     Meal delivery programs to review:   https://Essence Group Holdings.com/diabetic-meal-delivery  https://www.ITN Energy Systems.Casa Couture  https://www.Planet OS.Casa Couture/plans  https://www.InforSense.com/

## 2024-02-05 ENCOUNTER — ANCILLARY PROCEDURE (OUTPATIENT)
Dept: CARDIOLOGY | Facility: CLINIC | Age: 49
End: 2024-02-05
Attending: INTERNAL MEDICINE
Payer: COMMERCIAL

## 2024-02-05 DIAGNOSIS — I25.10 CORONARY ARTERY DISEASE INVOLVING NATIVE CORONARY ARTERY OF NATIVE HEART WITHOUT ANGINA PECTORIS: ICD-10-CM

## 2024-02-05 LAB — LVEF ECHO: NORMAL

## 2024-02-05 PROCEDURE — 93306 TTE W/DOPPLER COMPLETE: CPT | Performed by: STUDENT IN AN ORGANIZED HEALTH CARE EDUCATION/TRAINING PROGRAM

## 2024-02-05 NOTE — LETTER
2024      Helena Londono  [address]  [facility] [place] [facility] [address]        Dear ,    We are writing to inform you of your test results.    Your test result is normal. No change in plan. Follow up as recommended.     Resulted Orders   Echocardiogram Complete   Result Value Ref Range    LVEF  55-60%     Narrative    [id number]  [id number]  [id number]  [id number]^CITLALY^PRICILLA^MIRTA     [facility]  Echocardiography Laboratory  [address] [place] [address]     Name: EDWIN LONDONO  MRN: [id number]  : 1975  Study Date: 2024 01:21 PM  Age: 48 yrs  Gender: Male  Patient Location: [facility]  Reason For Study: Coronary artery disease involving native coronary artery of  native heart  Ordering Physician: PRICILLA BOUCHER  Referring Physician: PRICILLA BOUCHER  Performed By: Ira Estrada RDCS     BSA: 1.8 m2  Height: 64 in  Weight: 161 lb  BP: 145/83 mmHg  ______________________________________________________________________________  Procedure  Echocardiogram with two-dimensional, color and spectral Doppler performed.  ______________________________________________________________________________  Interpretation Summary     Global and regional left ventricular function is normal with an EF of 55-60%.  Global right ventricular function is normal. The right ventricle is normal  size.  No significant valvular abnormalities present.  IVC diameter <2.1 cm collapsing >50% with sniff suggests a normal RA pressure  of 3 mmHg.  There is no prior study for direct comparison.  ______________________________________________________________________________  Left Ventricle  Global and regional left ventricular function is normal with an EF of 55-60%.  Left ventricular wall thickness is normal. Left ventricular size is normal.  Left ventricular diastolic function is normal.     Right Ventricle  Global right ventricular function is normal. The right ventricle is  normal  size.     Atria  Both atria appear normal.     Mitral Valve  The mitral valve is normal. Trace mitral insufficiency is present.     Aortic Valve  The valve leaflets are not well visualized. On Doppler interrogation, there is  no significant stenosis or regurgitation.     Tricuspid Valve  The tricuspid valve is normal. Trace tricuspid insufficiency is present.  Pulmonary artery systolic pressure cannot be assessed.     Pulmonic Valve  The valve leaflets are not well visualized. Trace pulmonic insufficiency is  present.     Vessels  The aorta root is normal. The thoracic aorta is normal. IVC diameter <2.1 cm  collapsing >50% with sniff suggests a normal RA pressure of 3 mmHg.     Pericardium  No pericardial effusion is present.     Miscellaneous  No significant valvular abnormalities present.     Compared to Previous Study  There is no prior study for direct comparison.  ______________________________________________________________________________  MMode/2D Measurements & Calculations  IVSd: 0.87 cm  LVIDd: 4.5 cm  LVIDs: 3.1 cm  LVPWd: 1.2 cm  FS: 32.5 %  LV mass(C)d: 159.1 grams  LV mass(C)dI: 89.2 grams/m2  Ao root diam: 3.2 cm  asc Aorta Diam: 3.2 cm  LVOT diam: 2.0 cm  LVOT area: 3.1 cm2  Ao root diam index Ht(cm/m): 2.0  Ao root diam index BSA (cm/m2): 1.8  Asc Ao diam index BSA (cm/m2): 1.8  Asc Ao diam index Ht(cm/m): 2.0  LA Volume (BP): 43.3 ml     LA Volume Index (BP): 24.3 ml/m2  RWT: 0.51  TAPSE: 2.5 cm     Doppler Measurements & Calculations  MV E max bartolo: 99.8 cm/sec  MV A max bartolo: 62.1 cm/sec  MV E/A: 1.6  MV dec time: 0.15 sec  Ao V2 max: 150.0 cm/sec  Ao max P.0 mmHg  E/E' av.9  Lateral E/e': 7.3  Medial E/e': 8.5     RV S Bartolo: 15.5 cm/sec     ______________________________________________________________________________  Report approved by: Tiffanie Cuadra 2024 01:48 PM             If you have any questions or concerns, please call the clinic at the number listed above.        Sincerely,      Bishop Rivero MD

## 2024-03-12 ENCOUNTER — LAB (OUTPATIENT)
Dept: LAB | Facility: CLINIC | Age: 49
End: 2024-03-12
Payer: COMMERCIAL

## 2024-03-12 DIAGNOSIS — E11.9 TYPE 2 DIABETES MELLITUS WITHOUT COMPLICATION, WITHOUT LONG-TERM CURRENT USE OF INSULIN (H): ICD-10-CM

## 2024-03-12 DIAGNOSIS — I10 ESSENTIAL HYPERTENSION WITH GOAL BLOOD PRESSURE LESS THAN 140/90: Primary | ICD-10-CM

## 2024-03-12 DIAGNOSIS — I25.10 CORONARY ARTERY DISEASE INVOLVING NATIVE CORONARY ARTERY OF NATIVE HEART WITHOUT ANGINA PECTORIS: ICD-10-CM

## 2024-03-12 LAB
ANION GAP SERPL CALCULATED.3IONS-SCNC: 12 MMOL/L (ref 7–15)
APO A-I SERPL-MCNC: <6 MG/DL
BUN SERPL-MCNC: 9.7 MG/DL (ref 6–20)
CALCIUM SERPL-MCNC: 9.3 MG/DL (ref 8.6–10)
CHLORIDE SERPL-SCNC: 101 MMOL/L (ref 98–107)
CHOLEST SERPL-MCNC: 122 MG/DL
CREAT SERPL-MCNC: 0.78 MG/DL (ref 0.67–1.17)
CREAT UR-MCNC: 176 MG/DL
DEPRECATED HCO3 PLAS-SCNC: 24 MMOL/L (ref 22–29)
EGFRCR SERPLBLD CKD-EPI 2021: >90 ML/MIN/1.73M2
FASTING STATUS PATIENT QL REPORTED: YES
GLUCOSE SERPL-MCNC: 90 MG/DL (ref 70–99)
HBA1C MFR BLD: 6.1 % (ref 0–5.6)
HDLC SERPL-MCNC: 39 MG/DL
LDLC SERPL CALC-MCNC: 50 MG/DL
MICROALBUMIN UR-MCNC: <12 MG/L
MICROALBUMIN/CREAT UR: NORMAL MG/G{CREAT}
NONHDLC SERPL-MCNC: 83 MG/DL
POTASSIUM SERPL-SCNC: 4.1 MMOL/L (ref 3.4–5.3)
SODIUM SERPL-SCNC: 137 MMOL/L (ref 135–145)
TRIGL SERPL-MCNC: 164 MG/DL

## 2024-03-12 PROCEDURE — 80048 BASIC METABOLIC PNL TOTAL CA: CPT

## 2024-03-12 PROCEDURE — 82570 ASSAY OF URINE CREATININE: CPT

## 2024-03-12 PROCEDURE — 36415 COLL VENOUS BLD VENIPUNCTURE: CPT

## 2024-03-12 PROCEDURE — 83695 ASSAY OF LIPOPROTEIN(A): CPT

## 2024-03-12 PROCEDURE — 83036 HEMOGLOBIN GLYCOSYLATED A1C: CPT

## 2024-03-12 PROCEDURE — 82043 UR ALBUMIN QUANTITATIVE: CPT

## 2024-03-12 PROCEDURE — 80061 LIPID PANEL: CPT

## 2024-03-12 SDOH — HEALTH STABILITY: PHYSICAL HEALTH: ON AVERAGE, HOW MANY MINUTES DO YOU ENGAGE IN EXERCISE AT THIS LEVEL?: 20 MIN

## 2024-03-12 SDOH — HEALTH STABILITY: PHYSICAL HEALTH: ON AVERAGE, HOW MANY DAYS PER WEEK DO YOU ENGAGE IN MODERATE TO STRENUOUS EXERCISE (LIKE A BRISK WALK)?: 2 DAYS

## 2024-03-12 ASSESSMENT — SOCIAL DETERMINANTS OF HEALTH (SDOH): HOW OFTEN DO YOU GET TOGETHER WITH FRIENDS OR RELATIVES?: ONCE A WEEK

## 2024-03-12 NOTE — LETTER
March 13, 2024      Helena CONTRERAS Niya  [address]  [place] [address]        Dear ,    We are writing to inform you of your Lipid Panel test results.    The test result is stable and unchanged. No change in clinical plan. Follow up as recommended.     If you have any questions or concerns, please call the clinic at the number listed above.     Sincerely,    Bishop Rivero MD    Resulted Orders   Lipid panel reflex to direct LDL Non-fasting   Result Value Ref Range    Cholesterol 122 <200 mg/dL    Triglycerides 164 (H) <150 mg/dL    Direct Measure HDL 39 (L) >=40 mg/dL    LDL Cholesterol Calculated 50 <=100 mg/dL    Non HDL Cholesterol 83 <130 mg/dL    Patient Fasting > 8hrs? Yes     Narrative    Cholesterol  Desirable:  <200 mg/dL    Triglycerides  Normal:  Less than 150 mg/dL  Borderline High:  150-199 mg/dL  High:  200-499 mg/dL  Very High:  Greater than or equal to 500 mg/dL    Direct Measure HDL  Female:  Greater than or equal to 50 mg/dL   Male:  Greater than or equal to 40 mg/dL    LDL Cholesterol  Desirable:  <100mg/dL  Above Desirable:  100-129 mg/dL   Borderline High:  130-159 mg/dL   High:  160-189 mg/dL   Very High:  >= 190 mg/dL    Non HDL Cholesterol  Desirable:  130 mg/dL  Above Desirable:  130-159 mg/dL  Borderline High:  160-189 mg/dL  High:  190-219 mg/dL  Very High:  Greater than or equal to 220 mg/dL   Lipoprotein (a)   Result Value Ref Range    Lipoprotein (a) <6 <30 mg/dL

## 2024-03-12 NOTE — COMMUNITY RESOURCES LIST (ENGLISH)
03/12/2024   [facility]  N/A  For questions about this resource list or additional care needs, please contact your primary care clinic or care manager.  Phone: [phone]   Email: N/A   Address: [facility] [place] [address]   Hours: N/A        Hotlines and Helplines       Hotline - Housing crisis  1  [facility] Distance: 11.77 miles      Phone/Virtual   [address] [place] [address]  Language: English  Hours: Mon - Sun Open 24 Hours   Phone: (387) 961-4616 Email: info@[employer] Website: http://www.[employer]iva.[employer] Analytics     2  [facility] Distance: 11.82 miles      Phone/Virtual   [address] [place] [address]  Language: English  Hours: Mon - Sun Open 24 Hours  Fees: Free   Phone: (935) 489-4022 Email: info@saintolaf.org Website: http://www.saintolaf.org/          Housing       Coordinated Entry access point  3  [facility]  [facility] Distance: 10.78 miles      Phone/Virtual   [address] [place] [address]  Language: English  Hours: Mon - Fri 8:30 AM - 12:00 PM , Mon - Fri 1:00 PM - 4:00 PM  Fees: Free   Phone: (912) 429-2634 Ext.2 Email: christi@[facility].[employer].org Website: https://www.[facility] CampusAnagranusa.org/usn/     4  [facility] Distance: 11.04 miles      In-Person, Phone/Virtual   [address] [place] [address]  Language: English, Swazi  Hours: Mon - Fri 10:00 AM - 5:30 PM , Mon - Sun 7:30 PM - 10:20 PM , Sat - Sun 1:00 PM - 5:30 PM  Fees: Free   Phone: (137) 328-1697 Email: info@lovemeshare.me.[employer] Website: https://www.lovemeshare.me.org/our-programs/adult-shelter-connect-chase-shelter/     Drop-in center or day shelter  5  [facility] Distance: 10.95 miles      In-Person   [address] [place] [address]  Language: English, Hmong, Taiwanese, Swazi  Hours: Mon - Thu 8:30 AM - 4:30 PM , Sat - Sun 9:00 AM - 12:00 PM   Fees: Free   Phone: (764) 914-3870 Email: info@[employer] Website: https://[employer].org/     6  [facility] Distance: 12.36 miles      In-Person   [address] [place] [address]  Language: English  Hours: Mon - Fri 12:00 PM - 3:00 PM  Fees: Free   Phone: (435) 870-6334 Email: [employer] [place]@[employer] Website: http://[employer]/     Housing search assistance  7  [facility] ([facility] Distance: 2.99 miles      In-Person   [address] [place] [address]  Language: English  Hours: Mon - Fri 8:00 AM - 4:00 PM  Fees: Free   Phone: (387) 345-7121 Email: info@[employer] Mobile Website: https://[employer].DB[employer] Mobile/     8  [facility] [employer] Distance: 7.23 miles      Phone/Virtual   [address] [place] [address]  Language: English, South African  Hours: Mon - Fri 9:00 AM - 5:00 PM  Fees: Free   Phone: (888) 217-6920 Email: services@[employer] Website: https://www.Orthohub     Shelter for families  9  [facility] Distance: 22.86 miles      In-Person   [address] [place] [address]  Language: English  Hours: Mon - Fri 3:00 PM - 9:00 AM , Sat - Sun Open 24 Hours  Fees: Free   Phone: (939) 683-8274 Ext.1 Website: https://www.saintandrews.org/2020/07/03/emergency-family-shelter/     Shelter for individuals  10  [facility] Distance: 11.06 miles      In-Person   [address] [place] [address]  Language: English  Hours: Mon - Sun 5:00 PM - 10:00 AM  Fees: Free, Self Pay   Phone: (755) 127-9870 Email: info@[employer].org Website: https://www.cctwincities.org/locations/higher-ground-shelter/     11  [facility] Distance: 11.29 miles      In-Person   [address] [place] [address]  Language: English  Hours: Mon -  Fri 4:00 PM - 9:00 AM  Fees: Free   Phone: (848) 794-2745 Email: anabella@[facility].[employer].org Website: https://[facility].Viadeo.org/northern/[facility]/          Important Numbers & Websites       Emergency Services   911  [facility] Services   311  Poison Control   (450) 266-7766  Suicide Prevention Lifeline   (960) 956-6706 (TALK)  Child Abuse Hotline   (632) 629-4662 (4-A-Child)  Sexual Assault Hotline   (899) 237-6702 (HOPE)  National Runaway Safeline   (776) 607-5405 (RUNAWAY)  All-Options Talkline   (278) 675-2204  Substance Abuse Referral   (194) 182-9760 (HELP)

## 2024-03-18 ENCOUNTER — MYC MEDICAL ADVICE (OUTPATIENT)
Dept: FAMILY MEDICINE | Facility: CLINIC | Age: 49
End: 2024-03-18

## 2024-03-18 ENCOUNTER — LAB (OUTPATIENT)
Dept: URGENT CARE | Facility: URGENT CARE | Age: 49
End: 2024-03-18
Attending: INTERNAL MEDICINE
Payer: COMMERCIAL

## 2024-03-18 ENCOUNTER — VIRTUAL VISIT (OUTPATIENT)
Dept: FAMILY MEDICINE | Facility: CLINIC | Age: 49
End: 2024-03-18
Payer: COMMERCIAL

## 2024-03-18 DIAGNOSIS — J02.0 STREP THROAT: ICD-10-CM

## 2024-03-18 DIAGNOSIS — J02.9 SORE THROAT: ICD-10-CM

## 2024-03-18 DIAGNOSIS — J02.9 SORE THROAT: Primary | ICD-10-CM

## 2024-03-18 LAB — DEPRECATED S PYO AG THROAT QL EIA: POSITIVE

## 2024-03-18 PROCEDURE — 87635 SARS-COV-2 COVID-19 AMP PRB: CPT

## 2024-03-18 PROCEDURE — 99214 OFFICE O/P EST MOD 30 MIN: CPT | Mod: 95 | Performed by: INTERNAL MEDICINE

## 2024-03-18 PROCEDURE — 87880 STREP A ASSAY W/OPTIC: CPT

## 2024-03-18 RX ORDER — AMOXICILLIN 500 MG/1
500 CAPSULE ORAL 2 TIMES DAILY
Qty: 20 CAPSULE | Refills: 0 | Status: SHIPPED | OUTPATIENT
Start: 2024-03-18 | End: 2024-06-03

## 2024-03-18 NOTE — TELEPHONE ENCOUNTER
No interpretation note available for positive strep.     Please review and advise.     ADRIENNE Camacho  [facility]

## 2024-03-18 NOTE — PROGRESS NOTES
"    Instructions Relayed to Patient by Virtual Roomer:     Patient is active on [employer]:   Relayed following to patient: \"It looks like you are active on [employer], are you able to join the visit this way? If not, do you need us to send you a link now or would you like your provider to send a link via text or email when they are ready to initiate the visit?\"    Reminded patient to ensure they were logged on to virtual visit by arrival time listed. Documented in appointment notes if patient had flexibility to initiate visit sooner than arrival time. If pediatric virtual visit, ensured pediatric patient along with parent/guardian will be present for video visit.     Patient offered the website [email or web address] and/or phone number to [employer] Help line: [phone]      Answers submitted by the patient for this visit:  General Questionnaire (Submitted on 3/18/2024)  Chief Complaint: Chronic problems general questions HPI Form  On average, how many sweetened beverages do you drink each day (Examples: soda, juice, sweet tea, etc.  Do NOT count diet or artificially sweetened beverages)?: 0  How many days a week do you exercise enough to make your heart beat faster?: 3 or less  How many days per week do you miss taking your medication?: 0  General Concern (Submitted on 3/18/2024)  Chief Complaint: Chronic problems general questions HPI Form  What is the reason for your visit today?: sore throat and headache  When did your symptoms begin?: 1-3 days ago  What are your symptoms?: sore throat, swollen glands, headache  How would you describe these symptoms?: Mild  Are your symptoms:: Staying the same  Have you had these symptoms before?: Yes  Have you tried or received treatment for these symptoms before?: Yes  Did that treatment work? : Yes  Please describe the treatment you had:: antibiotics  Is there anything that makes you feel worse?: snoring  Is there anything that makes you feel better?: Tylenol, cough " drop  Helena is a 48 year old who is being evaluated via a billable video visit.    How would you like to obtain your AVS? MyChart  If the video visit is dropped, the invitation should be resent by: Send to e-mail at: [email or web address]  Will anyone else be joining your video visit? No      Assessment & Plan     Sore throat  Differential diagnosis for this includes strep versus COVID  We will check for both  - Symptomatic COVID-19 Virus (Coronavirus) by PCR Nose; Future  - Streptococcus A Rapid Screen w/Reflex to PCR; Future    Strep throat  Rapid strep has come positive for strep  We will start amoxicillin  Discussed about quarantine measures, he can certainly meet and be around people 24 hours after starting antibiotics and how to get other family numbers treated if they become symptomatic  - amoxicillin (AMOXIL) 500 MG capsule; Take 1 capsule (500 mg) by mouth 2 times daily      30 minutes spent by me on the date of the encounter doing chart review, history and exam, documentation and further activities per the note            Subjective   Helena is a 48 year old, presenting for the following health issues:  No chief complaint on file.    History of Present Illness       Reason for visit:  Sore throat and headache  Symptom onset:  1-3 days ago  Symptoms include:  Sore throat, swollen glands, headache  Symptom intensity:  Mild  Symptom progression:  Staying the same  Had these symptoms before:  Yes  Has tried/received treatment for these symptoms:  Yes  Previous treatment was successful:  Yes  Prior treatment description:  Antibiotics  What makes it worse:  Snoring  What makes it better:  Tylenol, cough Frederick consumes 0 sweetened beverage(s) daily. He exercises with enough effort to increase his heart rate 3 or less days per week.   He is taking medications regularly.               Review of Systems  Constitutional, HEENT, cardiovascular, pulmonary, gi and gu systems are negative, except as otherwise noted.       Objective           Vitals:  No vitals were obtained today due to virtual visit.    Physical Exam   GENERAL: alert and no distress  EYES: Eyes grossly normal to inspection.  No discharge or erythema, or obvious scleral/conjunctival abnormalities.  RESP: No audible wheeze, cough, or visible cyanosis.    SKIN: Visible skin clear. No significant rash, abnormal pigmentation or lesions.  NEURO: Cranial nerves grossly intact.  Mentation and speech appropriate for age.  PSYCH: Appropriate affect, tone, and pace of words          Video-Visit Details    Type of service:  Video Visit   Originating Location (pt. Location): Home    Distant Location (provider location):  Off-site  Platform used for Video Visit: Ankur  Signed Electronically by: Regan Vitale MD

## 2024-03-18 NOTE — TELEPHONE ENCOUNTER
Patient had virtual visit today with Dr. Vitale. Strep positive, Covid pending. Patient has appointment tomorrow with pcp.    Do you want patient to switch to virtual visit, or re schedule tomorrows appointment?    Future Appointments 3/18/2024 - 9/14/2024        Date Visit Type Length Department Provider     3/19/2024  3:00 PM MYC PREVENTATIVE ADULT VISIT 40 min BA FM/IM/PEDDolores Vargas MD    Location Instructions:     [facility] is located at [address] [place] [address] N. in [place]. This is just west of the [facility] exit off of [address] [address]. Free parking is available; from [facility], access the lot by turning onto [facility] or [facility].               3/29/2024  3:00 PM VIDEO VISIT RETURN 60 min MG NUTRITION Chari Gutierrez, RD              6/17/2024  1:15 PM RETURN ADULT EYE 15 min MG Santos Fitzpatrick MD Stephanie Laing RN

## 2024-03-19 ENCOUNTER — OFFICE VISIT (OUTPATIENT)
Dept: FAMILY MEDICINE | Facility: CLINIC | Age: 49
End: 2024-03-19
Attending: FAMILY MEDICINE
Payer: COMMERCIAL

## 2024-03-19 VITALS
OXYGEN SATURATION: 98 % | HEIGHT: 65 IN | DIASTOLIC BLOOD PRESSURE: 80 MMHG | SYSTOLIC BLOOD PRESSURE: 121 MMHG | TEMPERATURE: 98.9 F | BODY MASS INDEX: 27.14 KG/M2 | HEART RATE: 82 BPM | RESPIRATION RATE: 17 BRPM | WEIGHT: 162.9 LBS

## 2024-03-19 DIAGNOSIS — Z12.5 PROSTATE CANCER SCREENING: ICD-10-CM

## 2024-03-19 DIAGNOSIS — I10 HYPERTENSION GOAL BP (BLOOD PRESSURE) < 140/90: ICD-10-CM

## 2024-03-19 DIAGNOSIS — E11.9 TYPE 2 DIABETES MELLITUS WITHOUT COMPLICATION, WITHOUT LONG-TERM CURRENT USE OF INSULIN (H): ICD-10-CM

## 2024-03-19 DIAGNOSIS — Z00.00 ROUTINE GENERAL MEDICAL EXAMINATION AT A HEALTH CARE FACILITY: Primary | ICD-10-CM

## 2024-03-19 DIAGNOSIS — E78.5 HYPERLIPIDEMIA LDL GOAL <70: ICD-10-CM

## 2024-03-19 LAB — SARS-COV-2 RNA RESP QL NAA+PROBE: NEGATIVE

## 2024-03-19 PROCEDURE — 99214 OFFICE O/P EST MOD 30 MIN: CPT | Mod: 25 | Performed by: FAMILY MEDICINE

## 2024-03-19 PROCEDURE — 99396 PREV VISIT EST AGE 40-64: CPT | Performed by: FAMILY MEDICINE

## 2024-03-19 PROCEDURE — 99207 PR FOOT EXAM NO CHARGE: CPT | Mod: 25 | Performed by: FAMILY MEDICINE

## 2024-03-19 RX ORDER — AMLODIPINE BESYLATE 5 MG/1
5 TABLET ORAL DAILY
Qty: 90 TABLET | Refills: 1 | Status: SHIPPED | OUTPATIENT
Start: 2024-03-19 | End: 2024-09-16

## 2024-03-19 RX ORDER — LISINOPRIL 5 MG/1
5 TABLET ORAL DAILY
Qty: 90 TABLET | Refills: 1 | Status: SHIPPED | OUTPATIENT
Start: 2024-03-19 | End: 2024-09-16

## 2024-03-19 ASSESSMENT — PAIN SCALES - GENERAL: PAINLEVEL: MILD PAIN (2)

## 2024-03-19 NOTE — PROGRESS NOTES
Preventive Care Visit  [facility]  Dolores Andrez Baldwin MD, Family Medicine  Mar 19, 2024      Assessment & Plan     Routine general medical examination at a health care facility  Routine health maintenance otherwise up-to-date    Type 2 diabetes mellitus without complication, without long-term current use of insulin (H)  A1c numbers continue to look fantastic.  Has made some significant changes in diet after meeting with nutritionist  - lisinopril (ZESTRIL) 5 MG tablet; Take 1 tablet (5 mg) by mouth daily  - metFORMIN (GLUCOPHAGE) 500 MG tablet; Take 1 tablet (500 mg) by mouth daily (with dinner)    Hypertension goal BP (blood pressure) < 140/90  Stable on current regimen.  Continue same plan and routine follow-up.   - amLODIPine (NORVASC) 5 MG tablet; Take 1 tablet (5 mg) by mouth daily  - lisinopril (ZESTRIL) 5 MG tablet; Take 1 tablet (5 mg) by mouth daily    Hyperlipidemia LDL goal <70  Stable on current regimen.  Continue same plan and routine follow-up.         Counseling  Appropriate preventive services were discussed with this patient, including applicable screening as appropriate for fall prevention, nutrition, physical activity, Tobacco-use cessation, weight loss and cognition.  Checklist reviewing preventive services available has been given to the patient.  Reviewed patient's diet, addressing concerns and/or questions.   He is at risk for lack of exercise and has been provided with information to increase physical activity for the benefit of his well-being.       Regular exercise  See Patient Instructions    Subjective   Pat is a 48 year old, presenting for the following:  Physical and Diabetes (And overall health )        3/19/2024     2:42 PM   Additional Questions   Roomed by Sarah   Accompanied by self         3/19/2024     2:42 PM   Patient Reported Additional Medications   Patient reports taking the following new medications No        Health Care Directive  Patient does  not have a Health Care Directive or Living Will: Advance Directive received and scanned. Click on Code in the patient header to view.    HPI  Here today for routine checkup and follow-up on stable chronic issues        3/12/2024   General Health   How would you rate your overall physical health? Excellent   Feel stress (tense, anxious, or unable to sleep) Not at all         3/12/2024   Nutrition   Three or more servings of calcium each day? Yes   Diet: Regular (no restrictions)    Diabetic   How many servings of fruit and vegetables per day? (!) 2-3   How many sweetened beverages each day? 0-1         3/12/2024   Exercise   Days per week of moderate/strenous exercise 2 days   Average minutes spent exercising at this level 20 min   (!) EXERCISE CONCERN      3/12/2024   Social Factors   Frequency of gathering with friends or relatives Once a week   Worry food won't last until get money to buy more No   Food not last or not have enough money for food? No   Do you have housing?  No   Are you worried about losing your housing? No   Lack of transportation? No   Unable to get utilities (heat,electricity)? No   Want help with housing or utility concern? No   (!) HOUSING CONCERN PRESENT      3/12/2024   Fall Risk   Fallen 2 or more times in the past year? No   Trouble with walking or balance? No          3/12/2024   Dental   Dentist two times every year? Yes         3/12/2024   TB Screening   Were you born outside of the [place]? No         Today's PHQ-2 Score:       3/18/2024     7:37 AM   PHQ-2 ( 1999 Pfizer)   Q1: Little interest or pleasure in doing things 0   Q2: Feeling down, depressed or hopeless 0   PHQ-2 Score 0    0   Q1: Little interest or pleasure in doing things Not at all   Q2: Feeling down, depressed or hopeless Not at all   PHQ-2 Score 0           3/12/2024   Substance Use   Alcohol more than 3/day or more than 7/wk No   Do you use any other substances recreationally? No     Social History     Tobacco Use     Smoking status: Former     Packs/day: 1.00     Years: 20.00     Additional pack years: 0.00     Total pack years: 20.00     Types: Cigarettes     Start date: 9/15/1992     Quit date: 9/15/2012     Years since quittin.5     Passive exposure: Never    Smokeless tobacco: Never   Vaping Use    Vaping Use: Never used   Substance Use Topics    Alcohol use: Yes     Comment: 3 to 4 drinks - 4 to 7 days a week    Drug use: No           3/12/2024   STI Screening   New sexual partner(s) since last STI/HIV test? No   ASCVD Risk   The ASCVD Risk score (Lee DESIR, et al., 2019) failed to calculate for the following reasons:    The valid total cholesterol range is 130 to 320 mg/dL            3/12/2024   Contraception/Family Planning   Questions about contraception or family planning No        Reviewed and updated as needed this visit by Provider   Tobacco  Allergies  Meds  Problems  Med Hx  Surg Hx  Fam Hx            Labs reviewed in EPIC  BP Readings from Last 3 Encounters:   24 121/80   24 (!) 145/80   23 (!) 158/90    Wt Readings from Last 3 Encounters:   24 73.9 kg (162 lb 14.4 oz)   24 74.9 kg (165 lb 3.2 oz)   23 73.9 kg (163 lb)                      Review of Systems  CONSTITUTIONAL: NEGATIVE for fever, chills, change in weight  INTEGUMENTARY/SKIN: NEGATIVE for worrisome rashes, moles or lesions  EYES: NEGATIVE for vision changes or irritation  ENT/MOUTH: NEGATIVE for ear, mouth and throat problems  RESP: NEGATIVE for significant cough or SOB  BREAST: NEGATIVE for masses, tenderness or discharge  CV: NEGATIVE for chest pain, palpitations or peripheral edema  GI: NEGATIVE for nausea, abdominal pain, heartburn, or change in bowel habits  : NEGATIVE for frequency, dysuria, or hematuria  MUSCULOSKELETAL: NEGATIVE for significant arthralgias or myalgia  NEURO: NEGATIVE for weakness, dizziness or paresthesias  ENDOCRINE: NEGATIVE for temperature intolerance, skin/hair  "changes  HEME: NEGATIVE for bleeding problems  PSYCHIATRIC: NEGATIVE for changes in mood or affect     Objective    Exam  /80 (BP Location: Right arm, Patient Position: Sitting, Cuff Size: Adult Regular)   Pulse 82   Temp 98.9  F (37.2  C) (Temporal)   Resp 17   Ht 1.638 m (5' 4.5\")   Wt 73.9 kg (162 lb 14.4 oz)   SpO2 98%   BMI 27.53 kg/m     Estimated body mass index is 27.53 kg/m  as calculated from the following:    Height as of this encounter: 1.638 m (5' 4.5\").    Weight as of this encounter: 73.9 kg (162 lb 14.4 oz). Second BP: 121/80    Physical Exam  GENERAL: alert and no distress  EYES: Eyes grossly normal to inspection, PERRL and conjunctivae and sclerae normal  HENT: ear canals and TM's normal, nose and mouth without ulcers or lesions  NECK: no adenopathy, no asymmetry, masses, or scars  RESP: lungs clear to auscultation - no rales, rhonchi or wheezes  CV: regular rate and rhythm, normal S1 S2, no S3 or S4, no murmur, click or rub, no peripheral edema  ABDOMEN: soft, nontender, no hepatosplenomegaly, no masses and bowel sounds normal  MS: no gross musculoskeletal defects noted, no edema  SKIN: no suspicious lesions or rashes  NEURO: Normal strength and tone, mentation intact and speech normal  PSYCH: mentation appears normal, affect normal/bright        Signed Electronically by: Dolores Baldwin MD    "

## 2024-03-19 NOTE — PATIENT INSTRUCTIONS
Preventive Care Advice   This is general advice given by our system to help you stay healthy. However, your care team may have specific advice just for you. Please talk to your care team about your preventive care needs.  Nutrition  Eat 5 or more servings of fruits and vegetables each day.  Try wheat bread, brown rice and whole grain pasta (instead of white bread, rice, and pasta).  Get enough calcium and vitamin D. Check the label on foods and aim for 100% of the RDA (recommended daily allowance).  Lifestyle  Exercise at least 150 minutes each week   (30 minutes a day, 5 days a week).  Do muscle strengthening activities 2 days a week. These help control your weight and prevent disease.  No smoking.  Wear sunscreen to prevent skin cancer.  Have a dental exam and cleaning every 6 months.  Yearly exams  See your health care team every year to talk about:  Any changes in your health.  Any medicines your care team has prescribed.  Preventive care, family planning, and ways to prevent chronic diseases.  Shots (vaccines)   HPV shots (up to age 26), if you've never had them before.  Hepatitis B shots (up to age 59), if you've never had them before.  COVID-19 shot: Get this shot when it's due.  Flu shot: Get a flu shot every year.  Tetanus shot: Get a tetanus shot every 10 years.  Pneumococcal, hepatitis A, and RSV shots: Ask your care team if you need these based on your risk.  Shingles shot (for age 50 and up).  General health tests  Diabetes screening:  Starting at age 35, Get screened for diabetes at least every 3 years.  If you are younger than age 35, ask your care team if you should be screened for diabetes.  Cholesterol test: At age 39, start having a cholesterol test every 5 years, or more often if advised.  Bone density scan (DEXA): At age 50, ask your care team if you should have this scan for osteoporosis (brittle bones).  Hepatitis C: Get tested at least once in your life.  STIs (sexually transmitted  infections)  Before age 24: Ask your care team if you should be screened for STIs.  After age 24: Get screened for STIs if you're at risk. You are at risk for STIs (including HIV) if:  You are sexually active with more than one person.  You don't use condoms every time.  You or a partner was diagnosed with a sexually transmitted infection.  If you are at risk for HIV, ask about PrEP medicine to prevent HIV.  Get tested for HIV at least once in your life, whether you are at risk for HIV or not.  Cancer screening tests  Cervical cancer screening: If you have a cervix, begin getting regular cervical cancer screening tests at age 21. Most people who have regular screenings with normal results can stop after age 65. Talk about this with your provider.  Breast cancer scan (mammogram): If you've ever had breasts, begin having regular mammograms starting at age 40. This is a scan to check for breast cancer.  Colon cancer screening: It is important to start screening for colon cancer at age 45.  Have a colonoscopy test every 10 years (or more often if you're at risk) Or, ask your provider about stool tests like a FIT test every year or Cologuard test every 3 years.  To learn more about your testing options, visit: https://www.MetaLINCS/687318.pdf.  For help making a decision, visit: https://bit.ly/by32139.  Prostate cancer screening test: If you have a prostate and are age 55 to 69, ask your provider if you would benefit from a yearly prostate cancer screening test.  Lung cancer screening: If you are a current or former smoker age 50 to 80, ask your care team if ongoing lung cancer screenings are right for you.  For informational purposes only. Not to replace the advice of your health care provider. Copyright   2023 [employer]. All rights reserved. Clinically reviewed by the [facility] Transitions Program. [employer] [id number] - REV 01/24.

## 2024-04-14 ENCOUNTER — MYC MEDICAL ADVICE (OUTPATIENT)
Dept: FAMILY MEDICINE | Facility: CLINIC | Age: 49
End: 2024-04-14
Payer: COMMERCIAL

## 2024-04-15 ENCOUNTER — TELEPHONE (OUTPATIENT)
Dept: FAMILY MEDICINE | Facility: CLINIC | Age: 49
End: 2024-04-15
Payer: COMMERCIAL

## 2024-04-15 NOTE — TELEPHONE ENCOUNTER
Patient was called in regard to [employer] message below.    last Thursday I felt a sudden spasm along my left neck down to upper shoulder, kind of like an electrical sensation after shifting awkwardly. now that exact pain over my left chest (same as last fall) has occurred, same spot. using a Thera cane does provide some temporary relief. My heart rate has been higher than normal this weekend (might be stress, considering new career position, planning for several large events are in final stages). BP readings are still normal in the evening 128/78, 130/80, 124/74 but heart rate is higher than normal   update, heart rate has fallen back in line this morning BP continues to be normal, Ibuprofen helps with the pain, as does heating pad     Patient states that now he is feeling fine.  He states he had these same symptoms last year and had the full cardiac workup with nothing resulting as far as cause.  Stated he ended up taking a steroid pack at that time and the feeling went away.      Patient denies any other symptoms at this time and thinks he moved awkwardly and that is what caused this.  He didn't want to make an appointment at this time as he wants to see how the next 24 hours goes.  Discussed with patient to go to Emergency room should he develop worsening chest pain, difficulty breathing, nausea, vomiting, sweating, pain that radiates anywhere.  Patient verbalized understanding.  Stated he was out walking around a golf course today and felt fine.      Kristina Kjellberg, MSN, RN

## 2024-04-23 ENCOUNTER — TELEPHONE (OUTPATIENT)
Dept: FAMILY MEDICINE | Facility: CLINIC | Age: 49
End: 2024-04-23

## 2024-04-23 ENCOUNTER — ANCILLARY PROCEDURE (OUTPATIENT)
Dept: GENERAL RADIOLOGY | Facility: CLINIC | Age: 49
End: 2024-04-23
Attending: NURSE PRACTITIONER
Payer: COMMERCIAL

## 2024-04-23 ENCOUNTER — OFFICE VISIT (OUTPATIENT)
Dept: FAMILY MEDICINE | Facility: CLINIC | Age: 49
End: 2024-04-23
Payer: COMMERCIAL

## 2024-04-23 VITALS
SYSTOLIC BLOOD PRESSURE: 146 MMHG | WEIGHT: 163.9 LBS | HEIGHT: 64 IN | BODY MASS INDEX: 27.98 KG/M2 | TEMPERATURE: 98.9 F | RESPIRATION RATE: 10 BRPM | DIASTOLIC BLOOD PRESSURE: 84 MMHG | OXYGEN SATURATION: 99 % | HEART RATE: 91 BPM

## 2024-04-23 DIAGNOSIS — M62.838 NECK MUSCLE SPASM: ICD-10-CM

## 2024-04-23 DIAGNOSIS — M54.12 CERVICAL RADICULOPATHY: ICD-10-CM

## 2024-04-23 DIAGNOSIS — M54.12 CERVICAL RADICULOPATHY: Primary | ICD-10-CM

## 2024-04-23 DIAGNOSIS — M62.838 MUSCLE SPASMS OF NECK: ICD-10-CM

## 2024-04-23 DIAGNOSIS — I10 HYPERTENSION GOAL BP (BLOOD PRESSURE) < 140/90: ICD-10-CM

## 2024-04-23 PROCEDURE — 72040 X-RAY EXAM NECK SPINE 2-3 VW: CPT | Mod: TC | Performed by: RADIOLOGY

## 2024-04-23 PROCEDURE — 99214 OFFICE O/P EST MOD 30 MIN: CPT | Performed by: NURSE PRACTITIONER

## 2024-04-23 RX ORDER — METHOCARBAMOL 500 MG/1
500 TABLET, FILM COATED ORAL 4 TIMES DAILY PRN
Qty: 30 TABLET | Refills: 0 | Status: SHIPPED | OUTPATIENT
Start: 2024-04-23 | End: 2024-05-30

## 2024-04-23 RX ORDER — PREDNISONE 20 MG/1
40 TABLET ORAL DAILY
Qty: 10 TABLET | Refills: 0 | Status: SHIPPED | OUTPATIENT
Start: 2024-04-23 | End: 2024-04-28

## 2024-04-23 ASSESSMENT — PAIN SCALES - GENERAL: PAINLEVEL: MILD PAIN (3)

## 2024-04-23 NOTE — PROGRESS NOTES
Subjective   Helena is a 48 year old, presenting for the following health issues:  Neck Pain (Neck/chest pain x 1 week. Feels exactly like the type of pain he had an issue with last fall/winter)        4/23/2024     1:45 PM   Additional Questions   Roomed by Kathleen WHEAT   Accompanied by Self         4/23/2024     1:45 PM   Patient Reported Additional Medications   Patient reports taking the following new medications None     History of Present Illness       Reason for visit:  Pain in neck and chest  Symptom onset:  1-2 weeks ago  Symptoms include:  Pain in neck and left chest  Symptom intensity:  Mild  Symptom progression:  Staying the same  Had these symptoms before:  Yes  Has tried/received treatment for these symptoms:  Yes  Previous treatment was successful:  Yes  Prior treatment description:  Steroid  dose pack  What makes it worse:  Not really  What makes it better:  Heat pad, back exerciseHe consumes 0 sweetened beverage(s) daily.He exercises with enough effort to increase his heart rate 10 to 19 minutes per day.  He exercises with enough effort to increase his heart rate 3 or less days per week.   He is taking medications regularly.       Neck Pain  Duration of complaint: started about 10 days ago   Remember moving in a certain position as almost felt like a shock      Description:   Location: left side of neck   Radiation: nto the left shoulder  Intensity: 6/10  Progression of Symptoms:  waxing and waning  Accompanying Signs & Symptoms:  Burning, prickly sensation (paresthesias) in arm(s): no  Numbness in arm(s): no  Weakness in arm(s):  no  Fever: no  Headache: no  Nausea and/or vomiting: no  History:   Trauma: no  Previous neck pain: YES  Previous surgery or injections: no  Previous Imaging (MRI,X ray): YES- attempted many years ago but of his back     Precipitating factors:   Does movement increase the pain:  no   Alleviating factors: can find positions that help   Heat helps   Therapies Tried and outcome:   tried some advil     Labs reviewed in EPIC  BP Readings from Last 3 Encounters:   24 (!) 146/84   24 121/80   24 (!) 145/80    Wt Readings from Last 3 Encounters:   24 74.3 kg (163 lb 14.4 oz)   24 73.9 kg (162 lb 14.4 oz)   24 74.9 kg (165 lb 3.2 oz)                  Patient Active Problem List   Diagnosis    Migraine headache    Hypertension goal BP (blood pressure) < 140/90    Type 2 diabetes mellitus without complication, without long-term current use of insulin (H)    Claustrophobia    Hyperlipidemia LDL goal <70    Acute bilateral low back pain with right-sided sciatica    Family history of early CAD     Past Surgical History:   Procedure Laterality Date    COLONOSCOPY WITH CO2 INSUFFLATION N/A 2022    Procedure: COLONOSCOPY, WITH CO2 INSUFFLATION;  Surgeon: David Montgomery DO;  Location: [facility] OR    NO HISTORY OF SURGERY         Social History     Tobacco Use    Smoking status: Former     Current packs/day: 0.00     Average packs/day: 1 pack/day for 20.0 years (20.0 ttl pk-yrs)     Types: Cigarettes     Start date: 9/15/1992     Quit date: 9/15/2012     Years since quittin.6     Passive exposure: Never    Smokeless tobacco: Never   Substance Use Topics    Alcohol use: Yes     Comment: 3 to 4 drinks - 4 to 7 days a week     Family History   Problem Relation Age of Onset    Diabetes Mother     Heart Disease Father     Musculoskeletal Disorder Father     Iritis Father          Current Outpatient Medications   Medication Sig Dispense Refill    amLODIPine (NORVASC) 5 MG tablet Take 1 tablet (5 mg) by mouth daily 90 tablet 1    amoxicillin (AMOXIL) 500 MG capsule Take 1 capsule (500 mg) by mouth 2 times daily 20 capsule 0    atorvastatin (LIPITOR) 40 MG tablet Take 1 tablet (40 mg) by mouth at bedtime 90 tablet 3    lisinopril (ZESTRIL) 5 MG tablet Take 1 tablet (5 mg) by mouth daily 90 tablet 1    Magnesium-Potassium 250-100 MG TABS       metFORMIN  "(GLUCOPHAGE) 500 MG tablet Take 1 tablet (500 mg) by mouth daily (with dinner) 90 tablet 1    methocarbamol (ROBAXIN) 500 MG tablet Take 1 tablet (500 mg) by mouth 4 times daily as needed for muscle spasms 30 tablet 0    methylPREDNISolone (MEDROL DOSEPAK) 4 MG tablet therapy pack Follow Package Directions 21 tablet 0    multivitamin w/minerals (THERA-VIT-M) tablet Take 1 tablet by mouth daily      OMEPRAZOLE PO Take 20 mg by mouth daily      order for DME Equipment being ordered: Blood pressure monitor.  Check blood pressure every morning.  Goal blood pressure: systolic less than 125; diastolic blood pressure less than 75. 1 each 0    triamterene-HCTZ (DYAZIDE) 37.5-25 MG capsule TAKE 1 CAPSULE DAILY (NEED FOLLOW UP VISIT FOR ANY FURTHER REFILL) 90 capsule 1     Allergies   Allergen Reactions    Cats     Dogs     Dust Mites Other (See Comments)     Recent Labs   Lab Test 03/12/24  0942 09/14/23  0733 02/09/23  1118 05/19/22  0906 10/05/21  1033 01/14/21  0959 12/19/19  0807 05/16/19  0653 11/08/18  0653   A1C 6.1* 6.3* 5.9* 5.7*   < > 5.9* 5.7*   < > 5.8*   LDL 50  --  70 53  --  80 68  --  73   HDL 39*  --  56 45  --  48 44  --  45   TRIG 164*  --  136 156*  --  147 215*  --  276*   ALT  --   --  38 51  --  57 65  --  51   CR 0.78  --  0.85 0.80   < > 0.88 0.79   < > 0.90   GFRESTIMATED >90  --  >90 >90   < > >90 >90   < > >90   GFRESTBLACK  --   --   --   --   --  >90 >90   < > >90   POTASSIUM 4.1  --  4.4 4.1   < > 4.3 4.0   < > 4.1   TSH  --   --   --   --   --   --  1.26  --  2.83    < > = values in this interval not displayed.          Review of Systems  Constitutional, HEENT, cardiovascular, pulmonary, gi and gu systems are negative, except as otherwise noted.      Objective    BP (!) 146/84 (BP Location: Right arm, Patient Position: Sitting, Cuff Size: Adult Regular)   Pulse 91   Temp 98.9  F (37.2  C) (Temporal)   Resp 10   Ht 1.632 m (5' 4.25\")   Wt 74.3 kg (163 lb 14.4 oz)   SpO2 99%   BMI 27.91 " kg/m    Body mass index is 27.91 kg/m .  Physical Exam   GENERAL: Patient is well nourished, well developed,in no apparent distress, non-toxic, in no respiratory distress and acyanotic, alert, cooperative, and well hydrated  EYES: Eyes grossly normal to inspection and conjunctivae and sclerae normal  HENT:ear canals and TM's normal and nose and mouth without ulcers or lesions   NECK:normal and supple  CARDIAC:regular rates and rhythm, no murmur, click or rub, and no irregular beats  RESP: normal respiratory rate and rhythm, lungs clear to auscultation  unlabored respirations, no intercostal retractions or accessory muscle use  ABD:soft, nontender  SKIN: Skin color, texture, turgor normal. No rashes or lesions.  MS: extremities normal- no gross deformities noted, gait normal, and normal muscle tone  no musculoskeletal defects are noted, gait is age appropriate without ataxia    Cervical Spine Exam: Inspection: normal cervical lordosis  Tender:  left paracervical muscles, right paracervical muscles  Non-tender:  spinous processes  Range of Motion:  Full ROM of cervical spine  Strength: Full strength of all neck muscles  NEURO: Normal strength and tone, sensory exam grossly normal, mentation intact, and speech normal  PSYCH: Alert, oriented, thought content appropriate,mentation appears normal., affect and mood normal    Results for orders placed or performed in visit on 04/23/24   XR Cervical Spine 2/3 Views     Status: None    Narrative    XR CERVICAL SPINE 2/3 VIEWS 4/23/2024 2:34 PM     HISTORY: Cervical radiculopathy    COMPARISON: None.       Impression    IMPRESSION: Vertebral body heights are maintained. Disc heights are  relatively well preserved. No anterolisthesis or retrolisthesis. The  craniocervical junction is unremarkable.     MIAH OROPEZA MD         SYSTEM ID:  [id number]     Assessment & Plan     Cervical radiculopathy  Symptomatic therapy suggested:   prescription for muscle relaxant, prescription for  NSAID given, referral to Physical Therapy  Will follow up and/or notify patient of  results via My Chart to determine further need for followup   - XR Cervical Spine 2/3 Views  - Physical Therapy  Referral  - predniSONE (DELTASONE) 20 MG tablet  Dispense: 10 tablet; Refill: 0    Neck muscle spasm  Symptomatic therapy suggested:   prescription for muscle relaxant, prescription for NSAID given, referral to Physical Therapy  - Physical Therapy  Referral  - methocarbamol (ROBAXIN) 500 MG tablet  Dispense: 30 tablet; Refill: 0    Hypertension goal BP (blood pressure) < 140/90  HTN Plan:  1)  Medication: continue current medication regimen unchanged  2)  Dietary sodium restriction  3)  Regular aerobic exercise    Patient Education: Reviewed risks of hypertension and principles of   treatment.      Muscle spasms of neck  Will Start:  - methocarbamol (ROBAXIN) 500 MG tablet  Dispense: 30 tablet; Refill: 0      Ordering of each unique test  Prescription drug management   Time spent by me doing chart review, history and exam, documentation and further activities per the note        See Patient Instructions  Patient Instructions   PLAN:   1.   Symptomatic therapy suggested:   prescription for muscle relaxant, prescription for NSAID given, referral to Physical Therapy    2.  Orders Placed This Encounter   Medications    predniSONE (DELTASONE) 20 MG tablet     Sig: Take 2 tablets (40 mg) by mouth daily for 5 days     Dispense:  10 tablet     Refill:  0    methocarbamol (ROBAXIN) 500 MG tablet     Sig: Take 1 tablet (500 mg) by mouth 4 times daily as needed for muscle spasms     Dispense:  30 tablet     Refill:  0     Orders Placed This Encounter   Procedures    XR Cervical Spine 2/3 Views    Physical Therapy  Referral       3.  FURTHER TESTING:       - xray neck   Will follow up and/or notify patient of  results via My Chart to determine further need for followup   Patient needs to follow up in if no  improvement,or sooner if worsening of symptoms or other symptoms develop.              Signed Electronically by: YANELI Mason CNP

## 2024-04-23 NOTE — TELEPHONE ENCOUNTER
"Patient called to clinic regarding ongoing neck pain. Notes he sent a [employer] message last week and spoke with an RN last week about neck pain. At that time, wanted to wait it out and see how did with continued use of NSAID medication and heat application.  Neck pain is not improving, getting more stiff. Having hard time turning head to left side. Can't turn past the shoulder. Very bothersome, has been ongoing for about 1 week now and is not going away. \"I think I just need a steroid pack\". Pain radiates to left shoulder and left side of chest.  Notes he had this same problem and pain last fall. Was prescribed steroids and it \"cleared up\".    Writer recommended patient be seen and further evaluated for persisting neck pain and request for steroid pack or treatment.  PCP had no availability. Scheduled to see partner, Lilly Romero CNP, today at  clinic. 2:00 PM.  Patient agreed with plan.      Emelia Vidal RN  Clinical Triage/Primary Care  [facility]    "

## 2024-04-23 NOTE — PATIENT INSTRUCTIONS
PLAN:   1.   Symptomatic therapy suggested:   prescription for muscle relaxant, prescription for NSAID given, referral to Physical Therapy    2.  Orders Placed This Encounter   Medications    predniSONE (DELTASONE) 20 MG tablet     Sig: Take 2 tablets (40 mg) by mouth daily for 5 days     Dispense:  10 tablet     Refill:  0    methocarbamol (ROBAXIN) 500 MG tablet     Sig: Take 1 tablet (500 mg) by mouth 4 times daily as needed for muscle spasms     Dispense:  30 tablet     Refill:  0     Orders Placed This Encounter   Procedures    XR Cervical Spine 2/3 Views    Physical Therapy  Referral       3.  FURTHER TESTING:       - xray neck   Will follow up and/or notify patient of  results via My Chart to determine further need for followup   Patient needs to follow up in if no improvement,or sooner if worsening of symptoms or other symptoms develop.

## 2024-04-23 NOTE — RESULT ENCOUNTER NOTE
Mely Núñez,    Attached are your test results.  You cervical spine shows your disc appear pretty normal which is good  Lets see if the PT and prednisone help    Please contact us if you have any questions.    Lilly Romero, CNP

## 2024-05-16 ENCOUNTER — MYC REFILL (OUTPATIENT)
Dept: FAMILY MEDICINE | Facility: CLINIC | Age: 49
End: 2024-05-16
Payer: COMMERCIAL

## 2024-05-16 DIAGNOSIS — I10 ESSENTIAL HYPERTENSION WITH GOAL BLOOD PRESSURE LESS THAN 140/90: ICD-10-CM

## 2024-05-17 RX ORDER — TRIAMTERENE AND HYDROCHLOROTHIAZIDE 37.5; 25 MG/1; MG/1
CAPSULE ORAL
Qty: 90 CAPSULE | Refills: 1 | Status: SHIPPED | OUTPATIENT
Start: 2024-05-17 | End: 2024-10-02

## 2024-05-24 ENCOUNTER — VIRTUAL VISIT (OUTPATIENT)
Dept: URGENT CARE | Facility: CLINIC | Age: 49
End: 2024-05-24
Payer: COMMERCIAL

## 2024-05-24 ENCOUNTER — TELEPHONE (OUTPATIENT)
Dept: FAMILY MEDICINE | Facility: CLINIC | Age: 49
End: 2024-05-24

## 2024-05-24 ENCOUNTER — MYC MEDICAL ADVICE (OUTPATIENT)
Dept: FAMILY MEDICINE | Facility: CLINIC | Age: 49
End: 2024-05-24

## 2024-05-24 DIAGNOSIS — M54.41 ACUTE BILATERAL LOW BACK PAIN WITH RIGHT-SIDED SCIATICA: Primary | ICD-10-CM

## 2024-05-24 PROCEDURE — 99213 OFFICE O/P EST LOW 20 MIN: CPT | Mod: 95

## 2024-05-24 RX ORDER — METHYLPREDNISOLONE 4 MG
TABLET, DOSE PACK ORAL
Qty: 21 TABLET | Refills: 0 | Status: SHIPPED | OUTPATIENT
Start: 2024-05-24 | End: 2024-07-31

## 2024-05-24 NOTE — PROGRESS NOTES
Helena is a 48 year old who is being evaluated via a billable video visit.    How would you like to obtain your AVS? MyChart  If the video visit is dropped, the invitation should be resent by: Text to cell phone: [phone]  Will anyone else be joining your video visit? No      Assessment & Plan     Acute bilateral low back pain with right-sided sciatica  Discussed   - methylPREDNISolone (MEDROL DOSEPAK) 4 MG tablet therapy pack; Follow Package Directions            Patient Instructions   Medrol dose pack     Robaxin if needed     If pain not improving next week consider physical therapy     No follow-ups on file.    Subjective   Helena is a 48 year old, presenting for the following health issues:  No chief complaint on file.    HPI     Low back pain - feels he over did getting ready for golfing   Started around waist and then has gone to calf   He is doing physical therapy he learned in 2022 but usually needs a medrol dose pack to get through   Declined physical therapy for now  He has robaxin given him a month ago and has used with relief - does not need refill       Review of Systems  Constitutional, neuro, ENT, endocrine, pulmonary, cardiac, gastrointestinal, genitourinary, musculoskeletal, integument and psychiatric systems are negative, except as otherwise noted.      Objective           Vitals:  No vitals were obtained today due to virtual visit.    Physical Exam   GENERAL: alert and no distress  EYES: Eyes grossly normal to inspection.  No discharge or erythema, or obvious scleral/conjunctival abnormalities.  RESP: No audible wheeze, cough, or visible cyanosis.    SKIN: Visible skin clear. No significant rash, abnormal pigmentation or lesions.  NEURO: Cranial nerves grossly intact.  Mentation and speech appropriate for age.  PSYCH: Appropriate affect, tone, and pace of words          Video-Visit Details    Type of service:  Video Visit   Originating Location (pt. Location): Home    Distant Location (provider  location):  Off-site  Platform used for Video Visit: Ankur  Signed Electronically by: [facility]

## 2024-05-24 NOTE — TELEPHONE ENCOUNTER
RN called patient. Please see telephone encounter from today, 5/24/24.    ANNA Russell, RN   [facility]

## 2024-05-24 NOTE — TELEPHONE ENCOUNTER
Patient sent [employer] message today, 5/24/24, reporting the following:    RN called patient to further triage symptoms.     Patient answered and stated that he has experienced these same symptoms a few times over the years. Feels the exact same way it has previously. Last experienced these symptoms in December of 2022. This is listed as a diagnosis on patient's chart. Patient reports that he did schedule a virtual visit for this afternoon to see if he can get a prescribed for a steroid pack. RN advised him that this visit should be just fine since he has experienced these symptoms before. Patient had no further questions/concerns at this time.     Leanne Crook, HIWOTN, RN   [facility]

## 2024-05-24 NOTE — PATIENT INSTRUCTIONS
Medrol dose pack     Robaxin if needed     If pain not improving next week consider physical therapy

## 2024-05-29 ENCOUNTER — MYC MEDICAL ADVICE (OUTPATIENT)
Dept: FAMILY MEDICINE | Facility: CLINIC | Age: 49
End: 2024-05-29
Payer: COMMERCIAL

## 2024-05-29 DIAGNOSIS — M62.838 NECK MUSCLE SPASM: ICD-10-CM

## 2024-05-29 DIAGNOSIS — M62.838 MUSCLE SPASMS OF NECK: ICD-10-CM

## 2024-05-30 ENCOUNTER — TELEPHONE (OUTPATIENT)
Dept: FAMILY MEDICINE | Facility: CLINIC | Age: 49
End: 2024-05-30
Payer: COMMERCIAL

## 2024-05-30 RX ORDER — METHOCARBAMOL 500 MG/1
500 TABLET, FILM COATED ORAL 4 TIMES DAILY PRN
Qty: 30 TABLET | Refills: 0 | Status: SHIPPED | OUTPATIENT
Start: 2024-05-30 | End: 2024-06-03

## 2024-05-30 NOTE — TELEPHONE ENCOUNTER
Reason for Call:  Appointment Request    Patient requesting this type of appt: Chronic Diease Management/Medication/Follow-Up    Requested provider: Dolores Baldwin    Reason patient unable to be scheduled: Not within requested timeframe    When does patient want to be seen/preferred time: 1-2 days    Comments: Pt need an appt for sciatic nerve pain     Could we send this information to you in [employer] or would you prefer to receive a phone call?:   Patient would like to be contacted via [employer]    Call taken on 5/30/2024 at 11:15 AM by Nanci Olivares

## 2024-05-31 ENCOUNTER — MYC MEDICAL ADVICE (OUTPATIENT)
Dept: FAMILY MEDICINE | Facility: CLINIC | Age: 49
End: 2024-05-31
Payer: COMMERCIAL

## 2024-05-31 NOTE — TELEPHONE ENCOUNTER
Okay to use a same-day for this.  Even a virtual would probably work if it is just about medication and referral for therapy

## 2024-06-03 ENCOUNTER — OFFICE VISIT (OUTPATIENT)
Dept: FAMILY MEDICINE | Facility: CLINIC | Age: 49
End: 2024-06-03
Payer: COMMERCIAL

## 2024-06-03 VITALS
HEIGHT: 66 IN | RESPIRATION RATE: 12 BRPM | OXYGEN SATURATION: 98 % | DIASTOLIC BLOOD PRESSURE: 82 MMHG | HEART RATE: 92 BPM | TEMPERATURE: 98.2 F | WEIGHT: 162.44 LBS | SYSTOLIC BLOOD PRESSURE: 156 MMHG | BODY MASS INDEX: 26.11 KG/M2

## 2024-06-03 DIAGNOSIS — M54.41 ACUTE RIGHT-SIDED LOW BACK PAIN WITH RIGHT-SIDED SCIATICA: Primary | ICD-10-CM

## 2024-06-03 DIAGNOSIS — E11.9 TYPE 2 DIABETES MELLITUS WITHOUT COMPLICATION, WITHOUT LONG-TERM CURRENT USE OF INSULIN (H): ICD-10-CM

## 2024-06-03 DIAGNOSIS — M54.2 NECK PAIN: ICD-10-CM

## 2024-06-03 PROCEDURE — 99214 OFFICE O/P EST MOD 30 MIN: CPT | Performed by: FAMILY MEDICINE

## 2024-06-03 RX ORDER — METHOCARBAMOL 500 MG/1
500 TABLET, FILM COATED ORAL 4 TIMES DAILY PRN
Qty: 30 TABLET | Refills: 1 | Status: SHIPPED | OUTPATIENT
Start: 2024-06-03 | End: 2024-07-31

## 2024-06-03 RX ORDER — METHYLPREDNISOLONE 4 MG
TABLET, DOSE PACK ORAL
Qty: 21 TABLET | Refills: 1 | Status: SHIPPED | OUTPATIENT
Start: 2024-06-03 | End: 2024-07-31

## 2024-06-03 ASSESSMENT — PAIN SCALES - GENERAL
PAINLEVEL: MILD PAIN (2)
PAINLEVEL: NO PAIN (0)

## 2024-06-03 NOTE — PROGRESS NOTES
"  Assessment & Plan     Acute right-sided low back pain with right-sided sciatica  Patient well-known to me for ongoing chronic issues.  Has periodically had a flareup of neck pain/cervical radiculopathy and low back pain/sciatica.  The amount of symptoms varies.  Most recently he had a big flareup of low back pain with some sciatic symptoms that he thinks was related to an extensive putting lesson and practice where he was hunched over quite a bit.  So he contacted me about this and we will get him started on a Medrol Dosepak which helped some.  Has been through therapy for his back in the past and saw only pulmonary couple of years ago.  We will set him up again with physical therapy to help with both neck and back.  I renewed prescription for the methylprednisolone and methocarbamol on an as-needed basis.  As of today he is 75% better than it was at its worst.  Not back to normal but enough that he can actually golf.  We will keep working on this.  - methylPREDNISolone (MEDROL DOSEPAK) 4 MG tablet therapy pack; Follow Package Directions  - methocarbamol (ROBAXIN) 500 MG tablet; Take 1 tablet (500 mg) by mouth 4 times daily as needed for muscle spasms  - Physical Therapy  Referral; Future    Neck pain  As above  - methylPREDNISolone (MEDROL DOSEPAK) 4 MG tablet therapy pack; Follow Package Directions  - methocarbamol (ROBAXIN) 500 MG tablet; Take 1 tablet (500 mg) by mouth 4 times daily as needed for muscle spasms  - Physical Therapy  Referral; Future    Type 2 diabetes mellitus without complication, without long-term current use of insulin (H)  Stable on current regimen.  Continue same plan and routine follow-up.       BMI  Estimated body mass index is 26.22 kg/m  as calculated from the following:    Height as of this encounter: 1.676 m (5' 6\").    Weight as of this encounter: 73.7 kg (162 lb 7 oz).         Regular exercise  See Patient Instructions    Subjective   Pat is a 48 year old, " "presenting for the following health issues:  Pain (Sciatic )        6/3/2024     1:18 PM   Additional Questions   Roomed by Camila CERDA   Accompanied by self         6/3/2024     1:18 PM   Patient Reported Additional Medications   Patient reports taking the following new medications None     History of Present Illness       Reason for visit:  Sciatic nerve pain    He eats 2-3 servings of fruits and vegetables daily.He consumes 0 sweetened beverage(s) daily.He exercises with enough effort to increase his heart rate 10 to 19 minutes per day.  He exercises with enough effort to increase his heart rate 3 or less days per week.   He is taking medications regularly.           Here today in follow-up of acute neck and back pain.      Review of Systems  Constitutional, HEENT, cardiovascular, pulmonary, gi and gu systems are negative, except as otherwise noted.      Objective    BP (!) 156/82 (BP Location: Right arm, Patient Position: Sitting, Cuff Size: Adult Large)   Pulse 92   Temp 98.2  F (36.8  C) (Oral)   Resp 12   Ht 1.676 m (5' 6\")   Wt 73.7 kg (162 lb 7 oz)   SpO2 98%   BMI 26.22 kg/m    Body mass index is 26.22 kg/m .  Physical Exam   Alert, pleasant, upbeat, and in no apparent discomfort.  S1 and S2 normal, no murmurs, clicks, gallops or rubs. Regular rate and rhythm. Chest is clear; no wheezes or rales. No edema or JVD.  BACK - Good range of motion with straight leg raising negative bilaterally.  No significant tenderness to palpation.  CMS intact lower extremities bilaterally.  Normal deep tendon reflexes bilaterally.   Past labs reviewed with the patient.             Signed Electronically by: Dolores Baldwin MD    "

## 2024-06-05 ENCOUNTER — THERAPY VISIT (OUTPATIENT)
Dept: PHYSICAL THERAPY | Facility: CLINIC | Age: 49
End: 2024-06-05
Attending: FAMILY MEDICINE
Payer: COMMERCIAL

## 2024-06-05 DIAGNOSIS — M54.41 ACUTE RIGHT-SIDED LOW BACK PAIN WITH RIGHT-SIDED SCIATICA: ICD-10-CM

## 2024-06-05 DIAGNOSIS — M54.2 NECK PAIN: ICD-10-CM

## 2024-06-05 PROCEDURE — 97161 PT EVAL LOW COMPLEX 20 MIN: CPT | Mod: GP

## 2024-06-05 PROCEDURE — 97110 THERAPEUTIC EXERCISES: CPT | Mod: GP

## 2024-06-05 NOTE — PROGRESS NOTES
PHYSICAL THERAPY EVALUATION  Type of Visit: Evaluation    See electronic medical record for Abuse and Falls Screening details.    Subjective       Presenting condition or subjective complaint: scaitic  Date of onset: 05/24/24    Relevant medical history: Diabetes   Dates & types of surgery: none    Prior diagnostic imaging/testing results:       Prior therapy history for the same diagnosis, illness or injury: Yes physical    Living Environment  Social support: With a significant other or spouse   Type of home: House; 2-story; Multi-level; Basement   Stairs to enter the home: Yes 2 Is there a railing: No   Ramp: No   Stairs inside the home: Yes 26 Is there a railing: Yes   Help at home: None  Equipment owned:       Employment: Yes sr   Hobbies/Interests: golf, [employer]    Patient goals for therapy: play golf    Subjective Summary:  Fred is a 48 year old male who reports that he had a 2 hour putting session, followed by a driving range session the next day for 2 hours, followed by a round of golf.  He reports after this it developed the low back pain along the belt line that progressed down to his right calf.  He remembers the press ups and standing lumbar extension which abolish the symptoms immediately.  He also in the evening uses TENS and heating pads, which helps.    Patient reports symptoms of:  Pain    Patient report of Pain:  Pain Rating Now: 3/10  Pain Rating at Best: 0/10  Pain Rating at Worst: 5/10  Pain Location: Right Calf  Pain Quality/Description: Tight and a little tingly in the right calf or right lateral knee  Pain Better with: TENs, Heat, Lumbar extension  Pain Worse with: Golf  Progression of Symptoms: Improving    Patient reports Red Flags symptoms of:  None    OBJECTIVE:  Seated Lower Extremity Manual Muscle Test / Myotome Assessment:  Hip Flexion (L2): Right Leg 5/5, Left Leg 5/5  Knee Extension (L3): Right Leg 5/5, Left Leg 5/5  Ankle Dorsiflexion (L4): Right Leg 5/5, Left Leg  5/5  Great Toe (L5): Right Leg 5/5, Left Leg 5/5  Ankle Plantarflexion (S1): Right Leg 5/5, Left Leg 5/5  Knee Flexion (S2): Right Leg 4+/5, Left Leg 5/5  Single Leg Bridge: x15 reps max each side  Core Strength: [facility] Level 1 x10 reps max    Seated Neural Tension Assessment:   Slump Test: Right Leg: Positive with Ankle Dorsiflexion combined with Cervical Flexion, Left Leg: Positive with Ankle Dorsiflexion combined with Cervical Flexion.  Equally tight bilaterally    Standing Lumbar Active ROM:  Standing Lumbar Flexion (Hands slide down thighs): Fingertips to Distal Shins  Standing Lumbar Extension: No Restriction and WNL       Cata QUIJANOT Repeated Movements Lumbar Assessment:  Repeated Lumbar Flexion in Standing: Worse  Repeated Lumbar Extension with Prone Lying: No Effect  Repeated Lumbar Extension with Prone on Elbows: Better  Repeated Lumbar Extension with Prone Press Up: Abolished    Cata EMANUEL Classification: Derangement Extension Responder which has always abolished her pain         ASSESSMENT:  Fred is a 48 year old male referred to Physical Therapy for Right Sided Low Back Pain with Right Sciatica   from Dolores Baldwin MD.  Fred demonstrates findings of Pain that justify a need for formal Physical Therapy. These impairments interfere with their ability to perform self care tasks, work tasks, recreational activities, household chores, driving , household mobility, and community mobility as compared to their previous level of function.    Medical Diagnosis: Right Sided Low Back Pain with Right Sciatica    Treatment Diagnosis: Right Sided Low Back Pain with Right Sciatica     Clinical Decision Making (Complexity):  Clinical Presentation: Stable/Uncomplicated  Clinical Presentation Rationale: based on medical and personal factors listed in PT evaluation  Clinical Decision Making (Complexity): Low complexity      PHYSICAL THERAPY PLAN OF CARE:  Treatment Interventions:  Interventions: Manual  Therapy, Neuromuscular Re-education, Therapeutic Activity, Therapeutic Exercise    Long Term Goals     PT Goal 1  Goal Identifier: Golf  Goal Description: Patient will be able to golf 18 holes or golf for 2 hours at the range without low back pain during or afterwards  Rationale: to maximize safety and independence with performance of ADLs and functional tasks;to maximize safety and independence within the home;to maximize safety and independence within the community;to maximize safety and independence with transportation;to maximize safety and independence with self cares  Goal Progress: A 2 hour putting session, a 2 hour driving range session and a round of 9 holes irritated his low back  Target Date: 08/28/24    Frequency of Treatment: 1x a week  Duration of Treatment: 12 weeks         Risks and benefits of evaluation/treatment have been explained.   Patient/Family/caregiver agrees with Plan of Care.      Evaluation Time:     PT Eval, Low Complexity Minutes (42126): 13         Signing Clinician: Tae Aviles PT        SUMMARY OF PLAN OF CARE:  Helena arrives today with a return of his low back pain with right sided radicular pain.  This has previously abolished with lumbar extension and was caused by three days of lumbar flexion with multiple golf lessons.  He again arrives today presenting as a lumbar extension responder where his calf pain was abolished with press ups.  We will continue the prone press ups and lumbar extension to abolish his pain, but strength testing demonstrates core weakness and general deconditioning that could leave him susceptible to reoccurring low back pain.  Therefor, this round of therapy will actually aim to build his core strength and work towards preventing any future occurrence of back pain.

## 2024-06-12 ENCOUNTER — THERAPY VISIT (OUTPATIENT)
Dept: PHYSICAL THERAPY | Facility: CLINIC | Age: 49
End: 2024-06-12
Payer: COMMERCIAL

## 2024-06-12 DIAGNOSIS — M54.2 NECK PAIN: Primary | ICD-10-CM

## 2024-06-12 DIAGNOSIS — M54.41 ACUTE RIGHT-SIDED LOW BACK PAIN WITH RIGHT-SIDED SCIATICA: ICD-10-CM

## 2024-06-12 PROCEDURE — 97110 THERAPEUTIC EXERCISES: CPT | Mod: GP

## 2024-06-17 ENCOUNTER — OFFICE VISIT (OUTPATIENT)
Dept: OPHTHALMOLOGY | Facility: CLINIC | Age: 49
End: 2024-06-17
Payer: COMMERCIAL

## 2024-06-17 DIAGNOSIS — H52.13 MYOPIA OF BOTH EYES WITH ASTIGMATISM AND PRESBYOPIA: ICD-10-CM

## 2024-06-17 DIAGNOSIS — H50.111 EXOTROPIA OF RIGHT EYE: ICD-10-CM

## 2024-06-17 DIAGNOSIS — H52.203 MYOPIA OF BOTH EYES WITH ASTIGMATISM AND PRESBYOPIA: ICD-10-CM

## 2024-06-17 DIAGNOSIS — H52.4 MYOPIA OF BOTH EYES WITH ASTIGMATISM AND PRESBYOPIA: ICD-10-CM

## 2024-06-17 DIAGNOSIS — E11.9 TYPE 2 DIABETES MELLITUS WITHOUT COMPLICATION, WITHOUT LONG-TERM CURRENT USE OF INSULIN (H): Primary | ICD-10-CM

## 2024-06-17 PROCEDURE — 92014 COMPRE OPH EXAM EST PT 1/>: CPT | Performed by: OPHTHALMOLOGY

## 2024-06-17 PROCEDURE — 92015 DETERMINE REFRACTIVE STATE: CPT | Performed by: OPHTHALMOLOGY

## 2024-06-17 ASSESSMENT — CONF VISUAL FIELD
OD_NORMAL: 1
METHOD: COUNTING FINGERS
OD_SUPERIOR_NASAL_RESTRICTION: 0
OS_SUPERIOR_NASAL_RESTRICTION: 0
OS_INFERIOR_NASAL_RESTRICTION: 0
OD_SUPERIOR_TEMPORAL_RESTRICTION: 0
OD_INFERIOR_TEMPORAL_RESTRICTION: 0
OS_INFERIOR_TEMPORAL_RESTRICTION: 0
OS_NORMAL: 1
OS_SUPERIOR_TEMPORAL_RESTRICTION: 0
OD_INFERIOR_NASAL_RESTRICTION: 0

## 2024-06-17 ASSESSMENT — VISUAL ACUITY
OD_CC+: -2
OD_CC: 20/25
CORRECTION_TYPE: GLASSES
OS_CC: 20/20
METHOD: SNELLEN - LINEAR

## 2024-06-17 ASSESSMENT — REFRACTION_WEARINGRX
OD_SPHERE: -0.50
OD_ADD: +2.00
OS_AXIS: 019
OS_CYLINDER: +0.75
OS_ADD: +2.00
SPECS_TYPE: PAL
OD_AXIS: 173
OS_SPHERE: -0.50
OD_CYLINDER: +0.50

## 2024-06-17 ASSESSMENT — TONOMETRY
OS_IOP_MMHG: 19
IOP_METHOD: ICARE
OD_IOP_MMHG: 22
IOP_METHOD: TONOPEN
OD_IOP_MMHG: 19
OS_IOP_MMHG: 22

## 2024-06-17 ASSESSMENT — REFRACTION_MANIFEST
OS_AXIS: 005
OS_ADD: +2.25
OD_SPHERE: -0.75
OD_AXIS: 005
OS_CYLINDER: +0.50
OD_ADD: +2.25
OS_SPHERE: -0.75
OD_CYLINDER: +0.50

## 2024-06-17 ASSESSMENT — CUP TO DISC RATIO
OD_RATIO: 0.3
OS_RATIO: 0.3

## 2024-06-17 ASSESSMENT — SLIT LAMP EXAM - LIDS
COMMENTS: NORMAL
COMMENTS: NORMAL

## 2024-06-17 NOTE — NURSING NOTE
Chief Complaints and History of Present Illnesses   Patient presents with    Diabetic Eye Exam     Chief Complaint(s) and History of Present Illness(es)       Diabetic Eye Exam               Comments    Pt returns for a diabetic eye exam. He has no vision concerns with his present spectacles. He reports no significant discomfort each eye. He does not use any eye drops or eye medications.     Lab Results       Component                Value               Date                       A1C                      6.1                 03/12/2024                 A1C                      6.3                 09/14/2023                 A1C                      5.9                 02/09/2023                 A1C                      5.7                 05/19/2022                 A1C                      6.3                 10/05/2021                 A1C                      5.9                 01/14/2021                 A1C                      5.7                 12/19/2019                 A1C                      5.8                 05/16/2019                 A1C                      5.8                 11/08/2018                 A1C                      5.6                 04/17/2018

## 2024-06-17 NOTE — PROGRESS NOTES
HPI    Pt returns for a diabetic eye exam. He has no vision concerns with his present spectacles. He reports no significant discomfort each eye. He does not use any eye drops or eye medications.    Lab Results       Component                Value               Date                       A1C                      6.1                 03/12/2024                 A1C                      6.3                 09/14/2023                 A1C                      5.9                 02/09/2023                 A1C                      5.7                 05/19/2022                 A1C                      6.3                 10/05/2021                 A1C                      5.9                 01/14/2021                 A1C                      5.7                 12/19/2019                 A1C                      5.8                 05/16/2019                 A1C                      5.8                 11/08/2018                 A1C                      5.6                 04/17/2018             Last edited by Santos Valero MD on 6/17/2024  1:54 PM.         Review of systems for the eyes was negative other than the pertinent positives/negatives listed in the HPI.      Assessment & Plan    HPI:  Fred Núñez is a 48 year old male with history of T2DM, HLD, HTN presents for annual eye exam. Vision is stable. No acute concerns today    POHx: myopia with astigmatism and presbyopia  PMHx: T2DM, HLD, HTN  Current Medications:   Current Outpatient Medications   Medication Sig Dispense Refill    amLODIPine (NORVASC) 5 MG tablet Take 1 tablet (5 mg) by mouth daily 90 tablet 1    atorvastatin (LIPITOR) 40 MG tablet Take 1 tablet (40 mg) by mouth at bedtime 90 tablet 3    lisinopril (ZESTRIL) 5 MG tablet Take 1 tablet (5 mg) by mouth daily 90 tablet 1    Magnesium-Potassium 250-100 MG TABS       metFORMIN (GLUCOPHAGE) 500 MG tablet Take 1 tablet (500 mg) by mouth daily (with dinner) 90 tablet 1    methocarbamol  (ROBAXIN) 500 MG tablet Take 1 tablet (500 mg) by mouth 4 times daily as needed for muscle spasms 30 tablet 1    multivitamin w/minerals (THERA-VIT-M) tablet Take 1 tablet by mouth daily      OMEPRAZOLE PO Take 20 mg by mouth daily      order for DME Equipment being ordered: Blood pressure monitor.  Check blood pressure every morning.  Goal blood pressure: systolic less than 125; diastolic blood pressure less than 75. 1 each 0    triamterene-HCTZ (DYAZIDE) 37.5-25 MG capsule TAKE 1 CAPSULE DAILY (NEED FOLLOW UP VISIT FOR ANY FURTHER REFILL) 90 capsule 1    methylPREDNISolone (MEDROL DOSEPAK) 4 MG tablet therapy pack Follow Package Directions 21 tablet 1    methylPREDNISolone (MEDROL DOSEPAK) 4 MG tablet therapy pack Follow Package Directions 21 tablet 0     No current facility-administered medications for this visit.     FHx: dad-iritis  PSHx: denies history of ocular surgeries       Current Eye Medications:      Assessment & Plan:  (E11.9) Type 2 diabetes mellitus without complication, without long-term current use of insulin (H)  (primary encounter diagnosis)  Diagnosed 2017  Most recent HgBA1c 6.1 on 3/12/24  No background diabetic retinopathy or neovascularization noted on today's exam.  Discussed ocular and systemic benefits of blood pressure and blood sugar control.  Return in 1 year for full exam with dilation or sooner if changes to vision.      (H50.111) Exotropia of right eye  Exotropia with decreased stereopsis  Alternate fixates without diplopia   Discussed possible intervention, however patient not bothered currently    (H52.13,  H52.203,  H52.4) Myopia of both eyes with astigmatism and presbyopia  Patient has minimal change in myopia but a copy of today's glasses prescription was given.  The patient may wish to update the glasses if the lenses are scratched or the frames are too small.  Presbyopia is difficulty seeing up close and is treated with bifocals or over the counter reading glasses      Return  in about 1 year (around 6/17/2025) for Annual Visit-v/t/d/MRx.        Santos Valero MD     Attending Physician Attestation:  Complete documentation of historical and exam elements from today's encounter can be found in the full encounter summary report (not reduplicated in this progress note).  I personally obtained the chief complaint(s) and history of present illness.  I confirmed and edited as necessary the review of systems, past medical/surgical history, family history, social history, and examination findings as documented by others; and I examined the patient myself.  I personally reviewed the relevant tests, images, and reports as documented above.  I formulated and edited as necessary the assessment and plan and discussed the findings and management plan with the patient and family. - Santos Valero MD

## 2024-06-20 DIAGNOSIS — E11.9 TYPE 2 DIABETES MELLITUS WITHOUT COMPLICATION, WITHOUT LONG-TERM CURRENT USE OF INSULIN (H): ICD-10-CM

## 2024-07-30 ENCOUNTER — OFFICE VISIT (OUTPATIENT)
Dept: FAMILY MEDICINE | Facility: CLINIC | Age: 49
End: 2024-07-30
Payer: COMMERCIAL

## 2024-07-30 VITALS
TEMPERATURE: 98 F | SYSTOLIC BLOOD PRESSURE: 136 MMHG | HEIGHT: 64 IN | BODY MASS INDEX: 27.93 KG/M2 | HEART RATE: 92 BPM | OXYGEN SATURATION: 95 % | WEIGHT: 163.6 LBS | RESPIRATION RATE: 13 BRPM | DIASTOLIC BLOOD PRESSURE: 72 MMHG

## 2024-07-30 DIAGNOSIS — Z48.02 VISIT FOR SUTURE REMOVAL: Primary | ICD-10-CM

## 2024-07-30 DIAGNOSIS — M26.609 TMJ (TEMPOROMANDIBULAR JOINT SYNDROME): ICD-10-CM

## 2024-07-30 PROCEDURE — 99214 OFFICE O/P EST MOD 30 MIN: CPT | Performed by: FAMILY MEDICINE

## 2024-07-30 ASSESSMENT — PAIN SCALES - GENERAL: PAINLEVEL: NO PAIN (0)

## 2024-07-30 NOTE — PROGRESS NOTES
"  Assessment & Plan     Diagnoses and all orders for this visit:  Visit for suture removal  3 sutures to right akhil removed without issues, the patient tolerated the procedure.  TMJ (temporomandibular joint syndrome)   Make a dental appointment, continue with NSAIDS over the counter at appropriate doses. Continue using mouth guard.  Subjective   Pat is a 49 year old, presenting for the following health issues:  Suture Removal        7/30/2024     2:24 PM   Additional Questions   Roomed by Adriana VASQUEZ   Accompanied by None         7/30/2024     2:24 PM   Patient Reported Additional Medications   Patient reports taking the following new medications NA     The patient is here for suture removal to his fifth right toe and concerns for right ear and jaw pain without upper respiratory infection symptoms or fever. The patient uses a mouth guard.      Review of Systems  Constitutional, neuro, ENT, endocrine, pulmonary, cardiac, gastrointestinal, genitourinary, musculoskeletal, integument and psychiatric systems are negative, except as otherwise noted.      Objective    /72   Pulse 92   Temp 98  F (36.7  C) (Temporal)   Resp 13   Ht 1.632 m (5' 4.25\")   Wt 74.2 kg (163 lb 9.6 oz)   SpO2 95%   BMI 27.86 kg/m    Body mass index is 27.86 kg/m .  Physical Exam   GENERAL: alert and no distress  EYES: Eyes grossly normal to inspection, PERRL and conjunctivae and sclerae normal  HENT: ear canals and TM's normal, nose and mouth without ulcers or lesions  NECK: no adenopathy, no asymmetry, masses, or scars  RESP: lungs clear to auscultation - no rales, rhonchi or wheezes  CV: regular rate and rhythm, normal S1 S2, no S3 or S4, no murmur, click or rub, no peripheral edema  ABDOMEN: soft, nontender, no hepatosplenomegaly, no masses and bowel sounds normal  MS: no gross musculoskeletal defects noted, no edema  Skin right toe 5th  without  infection          Signed Electronically by: Claire Hackett MD    Answers submitted by " the patient for this visit:  General Questionnaire (Submitted on 7/30/2024)  Chief Complaint: Chronic problems general questions HPI Form  How many servings of fruits and vegetables do you eat daily?: 2-3  On average, how many sweetened beverages do you drink each day (Examples: soda, juice, sweet tea, etc.  Do NOT count diet or artificially sweetened beverages)?: 0  How many minutes a day do you exercise enough to make your heart beat faster?: 9 or less  How many days a week do you exercise enough to make your heart beat faster?: 3 or less  How many days per week do you miss taking your medication?: 0  General Concern (Submitted on 7/30/2024)  Chief Complaint: Chronic problems general questions HPI Form  What is the reason for your visit today?: remove stiches  When did your symptoms begin?: 3-7 days ago  What are your symptoms?: laceration  How would you describe these symptoms?: Mild  Are your symptoms:: Improving  Have you had these symptoms before?: No

## 2024-08-08 NOTE — PROGRESS NOTES
"   06/12/24 0500   Appointment Info   Signing clinician's name / credentials Tae Aviles, PT, DPT, CSCS, CLT   Total/Authorized Visits E&T   Visits Used 2   Medical Diagnosis Right Sided Low Back Pain with Right Sciatica   PT Tx Diagnosis Right Sided Low Back Pain with Right Sciatica   Progress Note/Certification   Onset of illness/injury or Date of Surgery 05/24/24   Therapy Frequency 1x a week   Predicted Duration 12 weeks   Progress Note Due Date 08/28/24   Progress Note Completed Date 06/05/24   PT Goal 1   Goal Identifier Golf   Goal Description Patient will be able to golf 18 holes or golf for 2 hours at the range without low back pain during or afterwards   Rationale to maximize safety and independence with performance of ADLs and functional tasks;to maximize safety and independence within the home;to maximize safety and independence within the community;to maximize safety and independence with transportation;to maximize safety and independence with self cares   Goal Progress A 2 hour putting session, a 2 hour driving range session and a round of 9 holes irritated his low back   Target Date 08/28/24   Subjective Report   Subjective Report He reports we're getting better and the exercises abolish the calf pain.  The muscle relaxers eliminate the pain all together.  He says the exercises \"aren't fun\" and feel like good exercise   Objective Measures   Objective Measures Objective Measure 1;Objective Measure 2   Objective Measure 1   Objective Measure Directional Preference   Details Prone Press Up   Objective Measure 2   Objective Measure Pain on Arrival   Details 1/10 in the right calf   Treatment Interventions (PT)   Interventions Therapeutic Procedure/Exercise   Therapeutic Procedure/Exercise   Therapeutic Procedures: strength, endurance, ROM, flexibility minutes (41416) 38   PTRx Ther Proc 1 Posture Correction with Lumbar Roll   PTRx Ther Proc 1 - Details Reviewed. Uses lumbar pillow at home.   PTRx Ther " Proc 2 Prone Press Ups   PTRx Ther Proc 2 - Details 2x15   PTRx Ther Proc 3 Standing Extension   PTRx Ther Proc 3 - Details Reviewed   PTRx Ther Proc 4 Standing Hamstring Stretch   PTRx Ther Proc 4 - Details 1x30 seconds each side   PTRx Ther Proc 5 Supine Abdominal Exercise #1 (Arm Extension)   PTRx Ther Proc 5 - Details 2x30 reaches overhead   PTRx Ther Proc 6 Single Leg Bridge   PTRx Ther Proc 6 - Details 2x30 each side   PTRx Ther Proc 7 Prone Lumbar Exercise #4 (Alternate Arm/Leg Extension)   PTRx Ther Proc 7 - Details 2x0 each side   Skilled Intervention Initate HEP   Patient Response/Progress Will perform at home   Ther Proc 1 Bike   Ther Proc 1 - Details Warm up at seat height 4, 5 minutes and level 6 resistance   PTRx Ther Proc 8 Squat   PTRx Ther Proc 8 - Details Leg Press Squats: 2x20 with 50 lbs added   Plan   Home program See PTRx   Updates to plan of care Continue per POC   Plan for next session Elects to discharge   Total Session Time   Timed Code Treatment Minutes 38   Total Treatment Time (sum of timed and untimed services) 38           DISCHARGE  Reason for Discharge: Patient chooses to discontinue therapy.    Equipment Issued: None    Discharge Plan: Patient to continue home program.    Referring Provider:  Dolores Baldwin

## 2024-09-11 DIAGNOSIS — E11.9 TYPE 2 DIABETES MELLITUS WITHOUT COMPLICATION, WITHOUT LONG-TERM CURRENT USE OF INSULIN (H): ICD-10-CM

## 2024-09-15 DIAGNOSIS — I10 HYPERTENSION GOAL BP (BLOOD PRESSURE) < 140/90: ICD-10-CM

## 2024-09-15 DIAGNOSIS — E11.9 TYPE 2 DIABETES MELLITUS WITHOUT COMPLICATION, WITHOUT LONG-TERM CURRENT USE OF INSULIN (H): ICD-10-CM

## 2024-09-16 RX ORDER — AMLODIPINE BESYLATE 5 MG/1
5 TABLET ORAL DAILY
Qty: 90 TABLET | Refills: 0 | Status: SHIPPED | OUTPATIENT
Start: 2024-09-16 | End: 2024-10-02

## 2024-09-16 RX ORDER — LISINOPRIL 5 MG/1
5 TABLET ORAL DAILY
Qty: 90 TABLET | Refills: 0 | Status: SHIPPED | OUTPATIENT
Start: 2024-09-16 | End: 2024-10-02

## 2024-09-23 ENCOUNTER — MYC MEDICAL ADVICE (OUTPATIENT)
Dept: FAMILY MEDICINE | Facility: CLINIC | Age: 49
End: 2024-09-23
Payer: COMMERCIAL

## 2024-09-26 ENCOUNTER — DOCUMENTATION ONLY (OUTPATIENT)
Dept: LAB | Facility: CLINIC | Age: 49
End: 2024-09-26
Payer: COMMERCIAL

## 2024-09-26 DIAGNOSIS — Z12.5 PROSTATE CANCER SCREENING: ICD-10-CM

## 2024-09-26 DIAGNOSIS — E11.9 TYPE 2 DIABETES MELLITUS WITHOUT COMPLICATION, WITHOUT LONG-TERM CURRENT USE OF INSULIN (H): Primary | ICD-10-CM

## 2024-09-26 NOTE — PROGRESS NOTES
Fred Núñez has an upcoming lab appointment:    Future Appointments   Date Time Provider Department Center   10[facility]2024 11:00 AM [facility] [place]   10/2/2024  2:00 PM Dolores Baldwin MD [facility]   6/23/2025  1:15 PM Santos Valero MD [facility] [place]     Patient is scheduled for the following lab(s): standard 6 month labs, fasting A1C     There is no order available. Please review and place either future orders or HMPO (Review of Health Maintenance Protocol Orders), as appropriate.    Health Maintenance Due   Topic    A1C     ANNUAL REVIEW OF HM ORDERS      Thank you,  Moriah Castro

## 2024-10-01 ENCOUNTER — LAB (OUTPATIENT)
Dept: LAB | Facility: CLINIC | Age: 49
End: 2024-10-01
Payer: COMMERCIAL

## 2024-10-01 DIAGNOSIS — Z12.5 PROSTATE CANCER SCREENING: ICD-10-CM

## 2024-10-01 DIAGNOSIS — E11.9 TYPE 2 DIABETES MELLITUS WITHOUT COMPLICATION, WITHOUT LONG-TERM CURRENT USE OF INSULIN (H): ICD-10-CM

## 2024-10-01 LAB
EST. AVERAGE GLUCOSE BLD GHB EST-MCNC: 126 MG/DL
HBA1C MFR BLD: 6 % (ref 0–5.6)
PSA SERPL DL<=0.01 NG/ML-MCNC: 0.34 NG/ML (ref 0–3.5)

## 2024-10-01 PROCEDURE — 83036 HEMOGLOBIN GLYCOSYLATED A1C: CPT

## 2024-10-01 PROCEDURE — 36415 COLL VENOUS BLD VENIPUNCTURE: CPT

## 2024-10-01 PROCEDURE — G0103 PSA SCREENING: HCPCS

## 2024-10-01 NOTE — RESULT ENCOUNTER NOTE
Wow, that sugar looks fantastic!  Keep up the good work.  PSA totally normal as well.    BURAK Baldwin M.D.

## 2024-10-01 NOTE — LETTER
October 1, 2024      Helena Núñez  [address]  [place] [facility] [address]        Dear ,    We are writing to inform you of your test results.    Wow, that sugar looks fantastic!  Keep up the good work.   PSA totally normal as well.       Resulted Orders   Hemoglobin A1c   Result Value Ref Range    Estimated Average Glucose 126 (H) <117 mg/dL    Hemoglobin A1C 6.0 (H) 0.0 - 5.6 %      Comment:      Normal <5.7%   Prediabetes 5.7-6.4%    Diabetes 6.5% or higher     Note: Adopted from ADA consensus guidelines.   Prostate Specific Antigen Screen   Result Value Ref Range    Prostate Specific Antigen Screen 0.34 0.00 - 3.50 ng/mL    Narrative    This result is obtained using the Roche Elecsys total PSA method on the kayley e601 immunoassay analyzer, which is an ultrasensitive method. Results obtained with different assay methods or kits cannot be used interchangeably.  This result is obtained using the Roche Elecsys total PSA method on the kayley e402 Pure immunoassay analyzer, which is an ultrasensitive method. Results obtained with different assay methods or kits cannot be used interchangeably.  This test is intended for initial prostate cancer screening. PSA values exceeding the age-specific limits are suspicious for prostate disease, but additional testing, such as prostate biopsy, is needed to diagnose prostate pathology. The American Cancer Society recommends annual examination with digital rectal examination and serum PSA beginning at age 50 and for men with a life expectancy of at least 10 years after detection of prostate cancer. For men in high-risk groups, such as  Americans or men with a first-degree relative diagnosed at a younger age, testing should begin at a younger age. It is generally recommended that information be provided to patients about the benefits and limitations of testing and treatment so they can make informed decisions.       If you have any questions or concerns, please  call the clinic at the number listed above.       Sincerely,      Dolores Baldwin MD

## 2024-10-02 ENCOUNTER — OFFICE VISIT (OUTPATIENT)
Dept: FAMILY MEDICINE | Facility: CLINIC | Age: 49
End: 2024-10-02
Attending: FAMILY MEDICINE
Payer: COMMERCIAL

## 2024-10-02 VITALS
SYSTOLIC BLOOD PRESSURE: 134 MMHG | HEART RATE: 80 BPM | OXYGEN SATURATION: 99 % | DIASTOLIC BLOOD PRESSURE: 82 MMHG | WEIGHT: 160 LBS | RESPIRATION RATE: 15 BRPM | BODY MASS INDEX: 27.31 KG/M2 | TEMPERATURE: 97.7 F | HEIGHT: 64 IN

## 2024-10-02 DIAGNOSIS — I10 HYPERTENSION GOAL BP (BLOOD PRESSURE) < 140/90: ICD-10-CM

## 2024-10-02 DIAGNOSIS — E78.5 HYPERLIPIDEMIA LDL GOAL <70: ICD-10-CM

## 2024-10-02 DIAGNOSIS — E11.9 TYPE 2 DIABETES MELLITUS WITHOUT COMPLICATION, WITHOUT LONG-TERM CURRENT USE OF INSULIN (H): Primary | ICD-10-CM

## 2024-10-02 DIAGNOSIS — Z82.49 FAMILY HISTORY OF EARLY CAD: ICD-10-CM

## 2024-10-02 DIAGNOSIS — Z23 NEED FOR PROPHYLACTIC VACCINATION AND INOCULATION AGAINST INFLUENZA: ICD-10-CM

## 2024-10-02 PROCEDURE — 90471 IMMUNIZATION ADMIN: CPT | Performed by: FAMILY MEDICINE

## 2024-10-02 PROCEDURE — 99214 OFFICE O/P EST MOD 30 MIN: CPT | Mod: 25 | Performed by: FAMILY MEDICINE

## 2024-10-02 PROCEDURE — 90673 RIV3 VACCINE NO PRESERV IM: CPT | Performed by: FAMILY MEDICINE

## 2024-10-02 RX ORDER — TRIAMTERENE AND HYDROCHLOROTHIAZIDE 37.5; 25 MG/1; MG/1
CAPSULE ORAL
Qty: 90 CAPSULE | Refills: 1 | Status: SHIPPED | OUTPATIENT
Start: 2024-10-02

## 2024-10-02 RX ORDER — AMLODIPINE BESYLATE 5 MG/1
5 TABLET ORAL DAILY
Qty: 90 TABLET | Refills: 0 | Status: SHIPPED | OUTPATIENT
Start: 2024-10-02

## 2024-10-02 RX ORDER — LISINOPRIL 5 MG/1
5 TABLET ORAL DAILY
Qty: 90 TABLET | Refills: 0 | Status: SHIPPED | OUTPATIENT
Start: 2024-10-02

## 2024-10-02 ASSESSMENT — PAIN SCALES - GENERAL: PAINLEVEL: NO PAIN (0)

## 2024-10-02 NOTE — PROGRESS NOTES
Assessment & Plan     Type 2 diabetes mellitus without complication, without long-term current use of insulin (H)  Has done fantastic with lifestyle and a small touch of metformin.  Plan follow-up 6 months  - lisinopril (ZESTRIL) 5 MG tablet; Take 1 tablet (5 mg) by mouth daily.  - metFORMIN (GLUCOPHAGE) 500 MG tablet; Take 1 tablet (500 mg) by mouth daily (with dinner).    Hypertension goal BP (blood pressure) < 140/90  Stable on current regimen.  Continue same plan and routine follow-up.   - amLODIPine (NORVASC) 5 MG tablet; Take 1 tablet (5 mg) by mouth daily.  - lisinopril (ZESTRIL) 5 MG tablet; Take 1 tablet (5 mg) by mouth daily.    Hyperlipidemia LDL goal <70  Stable on current regimen.  Continue same plan and routine follow-up.     Family history of early CAD  Controlling risk factors nicely    Need for prophylactic vaccination and inoculation against influenza            Regular exercise  See Patient Instructions    Subjective   Pat is a 49 year old, presenting for the following health issues:  Lab Result Notice (Follow up, Diabetes, High Blood Pressure//) and Imm/Inj (Flu Shot)        10/2/2024     1:55 PM   Additional Questions   Roomed by Sarah   Accompanied by self         10/2/2024     1:55 PM   Patient Reported Additional Medications   Patient reports taking the following new medications no     History of Present Illness       Diabetes:   He presents for follow up of diabetes.    He is not checking blood glucose.         He has no concerns regarding his diabetes at this time.   He is not experiencing numbness or burning in feet, excessive thirst, blurry vision, weight changes or redness, sores or blisters on feet.           Hypertension: He presents for follow up of hypertension.  He does check blood pressure  regularly outside of the clinic. Outpatient blood pressures have not been over 140/90. He does not follow a low salt diet.     He eats 2-3 servings of fruits and vegetables daily.He consumes 0  sweetened beverage(s) daily.He exercises with enough effort to increase his heart rate 9 or less minutes per day.  He exercises with enough effort to increase his heart rate 3 or less days per week.   He is taking medications regularly.         Diabetes Follow-up    Please respond to the following questions regarding your diabetes. These will be reviewed by your provider at the time of your appointment and will become a permanent part of your medical record.    How often are you checking your blood sugars? Not at all   What concerns do you have today about your diabetes? None   Do you have any of these symptoms? None of these symptoms               Hypertension Follow-up    Please respond to the following questions regarding your hypertension (high blood pressure). These will be reviewed by your provider at the time of your appointment and will become a permanent part of your medical record.    Do you check your blood pressure regularly outside of the clinic? Yes   Are your blood pressures ever more than 140 on the top number (systolic) OR more than 90 on the bottom number (diastolic)? (For example, greater than 140/90) No   Are you following a low salt diet? No       BP Readings from Last 2 Encounters:   10/02/24 134/82   07/30/24 136/72     Hemoglobin A1C (%)   Date Value   10/01/2024 6.0 (H)   03/12/2024 6.1 (H)   01/14/2021 5.9 (H)   12/19/2019 5.7 (H)     LDL Cholesterol Calculated (mg/dL)   Date Value   03/12/2024 50   02/09/2023 70   01/14/2021 80   12/19/2019 68     How many servings of fruits and vegetables do you eat daily? 2-3   On average, how many sweetened beverages do you drink each day (Examples: soda, juice, sweet tea, etc.  Do NOT count diet or artificially sweetened beverages)? 0   How many minutes a day do you exercise enough to make your heart beat faster? 9 or less   How many days a week do you exercise enough to make your heart beat faster? 3 or less   How many days per week do you miss taking  "your medication? 0     Here today in follow-up of diabetes, hypertension, lipids.  Recovered from recent case of COVID      Review of Systems  Constitutional, HEENT, cardiovascular, pulmonary, gi and gu systems are negative, except as otherwise noted.      Objective    /82 (BP Location: Right arm, Patient Position: Sitting, Cuff Size: Adult Regular)   Pulse 80   Temp 97.7  F (36.5  C) (Temporal)   Resp 15   Ht 1.632 m (5' 4.25\")   Wt 72.6 kg (160 lb)   SpO2 99%   BMI 27.25 kg/m    Body mass index is 27.25 kg/m .  Physical Exam   Alert, pleasant, upbeat, and in no apparent discomfort.  S1 and S2 normal, no murmurs, clicks, gallops or rubs. Regular rate and rhythm. Chest is clear; no wheezes or rales. No edema or JVD.  Past labs reviewed with the patient.     The longitudinal plan of care for the diagnosis(es)/condition(s) as documented were addressed during this visit. Due to the added complexity in care, I will continue to support Pat in the subsequent management and with ongoing continuity of care.        Signed Electronically by: Dolores Baldwin MD    "

## 2024-10-02 NOTE — NURSING NOTE
Prior to immunization administration, verified patients identity using patient s name and date of birth. Please see Immunization Activity for additional information.     Screening Questionnaire for Adult Immunization    Are you sick today?   No   Do you have allergies to medications, food, a vaccine component or latex?   No   Have you ever had a serious reaction after receiving a vaccination?   No   Do you have a long-term health problem with heart, lung, kidney, or metabolic disease (e.g., diabetes), asthma, a blood disorder, no spleen, complement component deficiency, a cochlear implant, or a spinal fluid leak?  Are you on long-term aspirin therapy?   No   Do you have cancer, leukemia, HIV/AIDS, or any other immune system problem?   No   Do you have a parent, brother, or sister with an immune system problem?   No   In the past 3 months, have you taken medications that affect  your immune system, such as prednisone, other steroids, or anticancer drugs; drugs for the treatment of rheumatoid arthritis, Crohn s disease, or psoriasis; or have you had radiation treatments?   No   Have you had a seizure, or a brain or other nervous system problem?   No   During the past year, have you received a transfusion of blood or blood    products, or been given immune (gamma) globulin or antiviral drug?   No   For women: Are you pregnant or is there a chance you could become       pregnant during the next month?   No   Have you received any vaccinations in the past 4 weeks?   No     Immunization questionnaire answers were all negative.      Patient instructed to remain in clinic for 15 minutes afterwards, and to report any adverse reactions.     Screening performed by Araceli Diallo MA on 10/2/2024 at 2:19 PM.

## 2024-12-11 ENCOUNTER — TELEPHONE (OUTPATIENT)
Dept: CARDIOLOGY | Facility: CLINIC | Age: 49
End: 2024-12-11
Payer: COMMERCIAL

## 2024-12-11 DIAGNOSIS — E78.5 HYPERLIPIDEMIA LDL GOAL <70: ICD-10-CM

## 2024-12-16 RX ORDER — ATORVASTATIN CALCIUM 40 MG/1
40 TABLET, FILM COATED ORAL AT BEDTIME
Qty: 90 TABLET | Refills: 0 | Status: SHIPPED | OUTPATIENT
Start: 2024-12-16

## 2024-12-16 NOTE — TELEPHONE ENCOUNTER
In reviewing Epic notes, pt has been under the care of Dr Rivero. Clinic staff notified to contact patient to set-up a follow-up with a new provider. Gracie refill sent.       Requested Prescriptions   Pending Prescriptions Disp Refills    atorvastatin (LIPITOR) 40 MG tablet [Pharmacy Med Name: ATORVASTATIN TABS 40MG] 90 tablet 3     Sig: TAKE 1 TABLET AT BEDTIME       Antihyperlipidemic agents Passed - 12/16/2024 12:57 PM        Passed - LDL on file in the past 12 months        Passed - Medication is active on med list        Passed - Recent (12 mo) or future (90 days) visit within the authorizing provider's specialty     The patient must have completed an in-person or virtual visit within the past 12 months or has a future visit scheduled within the next 90 days with the authorizing provider s specialty.  Urgent care and e-visits do not qualify as an office visit for this protocol.          Passed - Patient is age 18 years or older

## 2024-12-16 NOTE — TELEPHONE ENCOUNTER
atorvastatin (LIPITOR) 40 MG tablet 90 tablet 3 1/5/2024     Last Office Visit: 1/5/24  Future Office visit:   none    Routing refill request to provider for review/approval because:  Provider not longer with facility    Malathi Wellington RN  [facility]/Red Flag Triage & Med Refill Team

## 2025-01-03 ENCOUNTER — OFFICE VISIT (OUTPATIENT)
Dept: CARDIOLOGY | Facility: CLINIC | Age: 50
End: 2025-01-03
Payer: COMMERCIAL

## 2025-01-03 VITALS
SYSTOLIC BLOOD PRESSURE: 137 MMHG | HEART RATE: 95 BPM | BODY MASS INDEX: 28.17 KG/M2 | DIASTOLIC BLOOD PRESSURE: 81 MMHG | OXYGEN SATURATION: 97 % | HEIGHT: 64 IN | WEIGHT: 165 LBS

## 2025-01-03 DIAGNOSIS — I25.10 CORONARY ARTERY DISEASE INVOLVING NATIVE CORONARY ARTERY OF NATIVE HEART WITHOUT ANGINA PECTORIS: Primary | ICD-10-CM

## 2025-01-03 PROCEDURE — 99213 OFFICE O/P EST LOW 20 MIN: CPT | Performed by: INTERNAL MEDICINE

## 2025-01-03 NOTE — PATIENT INSTRUCTIONS
Take your medicines every day, as directed     Changes made today:  No medication changes          Cardiology Care Coordinators:      Crystal SMALLS RN     Cardiology Rooming Staff:  Erika ARIZMENDI EMT    Phone  [phone]      Fax [phone]    To Contact us     During Business Hours:  [phone]     If you are needing refills please contact your pharmacy.     For urgent after hour care please call the [facility] at [phone] or the [facility] at [phone] and ask to speak to the cardiologist on call.    If you are having a medical emergency, please call 911.            HOW TO CHECK YOUR BLOOD PRESSURE AT HOME:     Avoid eating, smoking, and exercising for at least 30 minutes before taking a reading.     Be sure you have taken your BP medication at least 2-3 hours before you check it.      Sit quietly for 10 minutes before a reading.      Sit in a chair with your feet flat on the floor. Rest your  arm on a table so that the arm cuff is at the same level as your heart.     Remain still during the reading.  Record your blood pressure and pulse in a log and bring to your next appointment.       Use [employer] allows you to communicate directly with your heart team through secure messaging.  [employer] can be accessed any time on your phone, computer, or tablet.  If you need assistance, we'd be happy to help!             Keep your Heart Appointments:     One year follow-up with cardiology

## 2025-01-03 NOTE — NURSING NOTE
Med Reconcile: Reviewed and verified all current medications with the patient. The updated medication list was printed and given to the patient./ No medication changes.       Return Appointment  -Follow-up in 1 year

## 2025-01-03 NOTE — NURSING NOTE
"Chief Complaint   Patient presents with    Follow Up       Initial BP (!) 152/79 (BP Location: Right arm, Patient Position: Chair, Cuff Size: Adult Regular)   Pulse 95   Ht 1.626 m (5' 4\")   Wt 74.8 kg (165 lb)   SpO2 97%   BMI 28.32 kg/m   Estimated body mass index is 28.32 kg/m  as calculated from the following:    Height as of this encounter: 1.626 m (5' 4\").    Weight as of this encounter: 74.8 kg (165 lb)..  BP completed using cuff size: regular    Erika CERDA CNA  "

## 2025-01-03 NOTE — PROGRESS NOTES
[facility]iology [facility]      REFERRING PROVIDER:  Dolores Baldwin MD    DATE OF VISIT:  January 3, 2025    REASON FOR VISIT:  Follow-up regarding subclinical coronary artery disease    HISTORY OF PRESENT ILLNESS:   Mr. Fred Núñez is a 49 year old  male with past medical history significant for subclinical coronary artery disease, family history of premature coronary artery disease, family history of malignant hyperthermia, hypertension, diabetes mellitus, mild to moderate obesity, migrainous headache, claustrophobia.    When he was last seen in cardiology clinic follow-up on 1/5/2024, his Lipitor was increased in dosage aiming for a goal of LDL less than 70.  The demonstrated benefit of lifestyle modification was emphasized i.e. healthy diet, regular exercise, maintenance of healthy weight, adequate control of blood pressure and diabetes, and avoidance of cigarette smoking and excessive alcohol consumption etc.  He returned to the cardiology clinic today for his annual follow-up.    In the interim, the patient has been doing well and reports no interim limitations in terms of his overall physical stamina and activity endurance.  He also denies interim significant symptoms referable to the cardiovascular system.  In particular, no interim history of chest pain, shortness of breath, or dyspnea on exertion.  No interim history of significant ankle edema, orthopnea, or paroxysmal nocturnal dyspnea.  No interim subjective awareness of irregular heart rhythm or rapid heart pulsation.  No interim history of severe lightheadedness, syncope or near syncope.  All in all, he has been doing well and has no particular complaint.    Medications, personal, family, and social history reviewed.    PAST MEDICAL  HISTORY:  Past Medical History:   Diagnosis Date    CARDIOVASCULAR SCREENING; LDL GOAL LESS THAN 160     Diabetes (H)     Hypertension 10/10/2015    blood pressure monitored daily    Malignant hyperthermia     family history- father- pt has not had testing    Migraine headache      PAST SURGICAL HISTORY:  Past Surgical History:   Procedure Laterality Date    COLONOSCOPY WITH CO2 INSUFFLATION N/A 01/18/2022    Procedure: COLONOSCOPY, WITH CO2 INSUFFLATION;  Surgeon: David Montgomery DO;  Location: [facility] OR    NO HISTORY OF SURGERY       CURRENT MEDICATIONS:  Current Outpatient Medications   Medication Sig Dispense Refill    amLODIPine (NORVASC) 5 MG tablet Take 1 tablet (5 mg) by mouth daily. 90 tablet 0    atorvastatin (LIPITOR) 40 MG tablet TAKE 1 TABLET AT BEDTIME 90 tablet 0    Ferrous Sulfate (IRON PO) Take 1 tablet by mouth daily.      lisinopril (ZESTRIL) 5 MG tablet Take 1 tablet (5 mg) by mouth daily. 90 tablet 0    Magnesium-Potassium 250-100 MG TABS       metFORMIN (GLUCOPHAGE) 500 MG tablet Take 1 tablet (500 mg) by mouth daily (with dinner). 90 tablet 0    multivitamin w/minerals (THERA-VIT-M) tablet Take 1 tablet by mouth daily      OMEPRAZOLE PO Take 20 mg by mouth daily      order for DME Equipment being ordered: Blood pressure monitor.  Check blood pressure every morning.  Goal blood pressure: systolic less than 125; diastolic blood pressure less than 75. 1 each 0    triamterene-HCTZ (DYAZIDE) 37.5-25 MG capsule TAKE 1 CAPSULE DAILY (NEED FOLLOW UP VISIT FOR ANY FURTHER REFILL) 90 capsule 1     ALLERGIES:  Allergies   Allergen Reactions    Amoxicillin Hives    Cats     Dogs     Dust Mites Other (See Comments)     FAMILY HISTORY:  Family History   Problem Relation Age of Onset    Diabetes Mother     Glaucoma Father     Heart Disease Father     Musculoskeletal Disorder Father     Iritis Father     Macular Degeneration No family hx of      SOCIAL HISTORY:  Tobacco Use    Smoking status:  "Former     Current packs/day: 0.00     Average packs/day: 1 pack/day for 20.0 years (20.0 ttl pk-yrs)     Types: Cigarettes     Start date: 9/15/1992     Quit date: 9/15/2012     Years since quittin.3     Passive exposure: Never    Smokeless tobacco: Never   Vaping Use    Vaping status: Never Used   Substance Use Topics    Alcohol use: Yes     Comment: 3 to 4 drinks - 4 to 7 days a week    Drug use: No     REVIEW OF SYSTEMS:  Other than as stated under history of present illness and/or past medical history, comprehensive review of other systems was performed and was unremarkable.    PHYSICAL EXAMINATION:  /81   Pulse 95   Ht 1.626 m (5' 4\")   Wt 74.8 kg (165 lb)   SpO2 97%   BMI 28.32 kg/m    GENERAL APPEARANCE: Mildly overweight but otherwise comfortable on room air with no evidence of central or peripheral cyanosis.  Alert and in no acute distress  HEENT: no icterus, no central cyanosis  LYMPH/NECK: no adenopathy, no asymmetry, no appreciable neck mass.  RESPIRATORY: lungs clear to auscultation - no rales, rhonchi or wheezes, no use of accessory muscles, no retractions, respirations are unlabored, normal respiratory rate  CARDIOVASCULAR: Auscultation reveals normal heart sounds.  There is a grade 1/6 systolic murmur at the apex.  There is no diastolic murmur.  GI: soft, non tender.  No hepatosplenomegaly.  EXTREMITIES: No significant ankle edema.  NEURO: alert, normal speech,and affect  SKIN: no ecchymoses, no rashes    I have reviewed the labs and imaging results below and made my comment in the assessment and plan.    DIAGNOSTIC DATA:  CBC RESULTS:   Lab Results   Component Value Date    WBC 13.5 (H) 2015    RBC 4.77 2015    HGB 15.3 2015    HCT 43.0 2015    MCV 90 2015    MCH 32.1 2015    MCHC 35.6 2015    RDW 12.8 2015     2015     BMP RESULTS:  Lab Results   Component Value Date     2024     2021    POTASSIUM " 4.1 03/12/2024    POTASSIUM 4.4 02/09/2023    POTASSIUM 4.3 01/14/2021    CHLORIDE 101 03/12/2024    CHLORIDE 102 02/09/2023    CHLORIDE 106 01/14/2021    CO2 24 03/12/2024    CO2 29 02/09/2023    CO2 30 01/14/2021    ANIONGAP 12 03/12/2024    ANIONGAP 6 02/09/2023    ANIONGAP 3 01/14/2021    GLC 90 03/12/2024     (H) 02/09/2023     (H) 01/14/2021    BUN 9.7 03/12/2024    BUN 15 02/09/2023    BUN 15 01/14/2021    CR 0.78 03/12/2024    CR 0.88 01/14/2021    GFRESTIMATED >90 03/12/2024    GFRESTIMATED >90 01/14/2021    GFRESTBLACK >90 01/14/2021    PAXTON 9.3 03/12/2024    PAXTON 9.5 01/14/2021      Lipid profile obtained on 3/12/2024 documented total cholesterol 122, HDL 39, LDL 50, triglyceride 164.    Hemoglobin A1c obtained on 10/1/2024 was 6.0%.    Electrocardiogram  Twelve-lead electrocardiogram obtained on 1/5/2024 documented sinus rhythm at rate of 103 bpm.  There was normal SC interval of 130 ms.  There was normal QRS duration of 72 ms.  QT and QTc intervals were measured at 344 and 450 ms respectively.  There was notable underlying motion artifact consistent with fine tremor.    Echocardiogram  Echocardiogram completed on 2/5/2024 was reported to show normal left ventricular size with normal wall thickness and normal global left ventricular systolic function with an estimated ejection fraction of 55 to 60%.  There was normal diastolic indicis.  There was normal right ventricular size with normal right ventricular systolic function.  There was normal biatrial size.  There was normal mitral valve with trace mitral regurgitation.  The aortic valve was not well-seen but there was no aortic stenosis or regurgitation by Doppler interrogation.  There was normal tricuspid valve.  There was tricuspid regurgitation but pulmonary arterial pressure could not be assessed.    Coronary CT calcium scoring  Coronary CT calcium scoring completed on 10/4/2023 was reported to show mild coronary artery calcification with  a total calcium score of 28.2 or 77th percentile for age and sex consisting of 27.7 in the LAD territory, 0.4 in the left circumflex and 0.2 in the RCA.    ASSESSMENT AND PLAN:   He has remained stable from a hemodynamic standpoint with no significant symptoms referable to the cardiovascular system.  Lipid profile obtained on 3/2/2024 did demonstrate improvement of LDL to 50 and therefore reaching the goal of less than 70  Echocardiogram completed on 2/5/2024 also documented structurally normal heart with preserved cardiac function.  The patient is quite cognizant regarding the importance of maintenance of lifestyle modification.  He will return to the cardiology clinic for follow-up in about a year, sooner if there is new development.    Total time spent today for this visit is 10 minutes including precharting, face-to-face clinic visit, review of labs/imaging and medical documentation.    Lee Zaragoza MD, FACC, FAHA, FHRS, CCDS  Cardiologist    (This medical documentation was transcribed using voice recognition technology and therefore will be susceptible to minor unintentional transcribing errors and/or omissions)

## 2025-02-17 ENCOUNTER — MYC REFILL (OUTPATIENT)
Dept: FAMILY MEDICINE | Facility: CLINIC | Age: 50
End: 2025-02-17
Payer: COMMERCIAL

## 2025-02-17 ENCOUNTER — PATIENT OUTREACH (OUTPATIENT)
Dept: CARE COORDINATION | Facility: CLINIC | Age: 50
End: 2025-02-17
Payer: COMMERCIAL

## 2025-02-17 DIAGNOSIS — E11.9 TYPE 2 DIABETES MELLITUS WITHOUT COMPLICATION, WITHOUT LONG-TERM CURRENT USE OF INSULIN (H): ICD-10-CM

## 2025-02-17 DIAGNOSIS — I10 HYPERTENSION GOAL BP (BLOOD PRESSURE) < 140/90: ICD-10-CM

## 2025-02-18 RX ORDER — LISINOPRIL 5 MG/1
5 TABLET ORAL DAILY
Qty: 90 TABLET | Refills: 0 | Status: SHIPPED | OUTPATIENT
Start: 2025-02-18

## 2025-03-15 ENCOUNTER — MYC MEDICAL ADVICE (OUTPATIENT)
Dept: FAMILY MEDICINE | Facility: CLINIC | Age: 50
End: 2025-03-15
Payer: COMMERCIAL

## 2025-03-15 ENCOUNTER — MYC REFILL (OUTPATIENT)
Dept: CARDIOLOGY | Facility: CLINIC | Age: 50
End: 2025-03-15
Payer: COMMERCIAL

## 2025-03-15 ENCOUNTER — MYC REFILL (OUTPATIENT)
Dept: FAMILY MEDICINE | Facility: CLINIC | Age: 50
End: 2025-03-15
Payer: COMMERCIAL

## 2025-03-15 DIAGNOSIS — I10 HYPERTENSION GOAL BP (BLOOD PRESSURE) < 140/90: ICD-10-CM

## 2025-03-15 DIAGNOSIS — E78.5 HYPERLIPIDEMIA LDL GOAL <70: ICD-10-CM

## 2025-03-17 RX ORDER — ATORVASTATIN CALCIUM 40 MG/1
40 TABLET, FILM COATED ORAL AT BEDTIME
Qty: 90 TABLET | Refills: 3 | Status: SHIPPED | OUTPATIENT
Start: 2025-03-17

## 2025-03-17 RX ORDER — AMLODIPINE BESYLATE 5 MG/1
5 TABLET ORAL DAILY
Qty: 90 TABLET | Refills: 0 | Status: SHIPPED | OUTPATIENT
Start: 2025-03-17

## 2025-03-20 ENCOUNTER — OFFICE VISIT (OUTPATIENT)
Dept: FAMILY MEDICINE | Facility: CLINIC | Age: 50
End: 2025-03-20
Payer: COMMERCIAL

## 2025-03-20 VITALS
SYSTOLIC BLOOD PRESSURE: 129 MMHG | OXYGEN SATURATION: 99 % | RESPIRATION RATE: 16 BRPM | TEMPERATURE: 97.4 F | WEIGHT: 165.8 LBS | HEIGHT: 64 IN | DIASTOLIC BLOOD PRESSURE: 89 MMHG | BODY MASS INDEX: 28.31 KG/M2 | HEART RATE: 84 BPM

## 2025-03-20 DIAGNOSIS — G43.109 MIGRAINE WITH AURA AND WITHOUT STATUS MIGRAINOSUS, NOT INTRACTABLE: Primary | ICD-10-CM

## 2025-03-20 DIAGNOSIS — S16.1XXA STRAIN OF NECK MUSCLE, INITIAL ENCOUNTER: ICD-10-CM

## 2025-03-20 RX ORDER — METHOCARBAMOL 500 MG/1
500 TABLET, FILM COATED ORAL 4 TIMES DAILY PRN
Qty: 30 TABLET | Refills: 0 | Status: SHIPPED | OUTPATIENT
Start: 2025-03-20

## 2025-03-20 ASSESSMENT — ENCOUNTER SYMPTOMS: HEADACHES: 1

## 2025-03-20 NOTE — PROGRESS NOTES
"  Assessment & Plan     Migraine with aura and without status migrainosus, not intractable  Previously had issues with migraines but has not had any in about 10 years until recently.  Continues on his potassium and magnesium supplement, does have labs scheduled for tomorrow and BMP already ordered by PCP, will add on magnesium.  Okay to continue with over-the-counter medications as they have been helpful.  Possibly triggered by his current neck strain, no other significant changes.  If continuing to have increased frequency of these could also consider neurology follow-up.  At this point exam is reassuring.  - Magnesium; Future    Strain of neck muscle, initial encounter  Trapezius musculature affected, will continue on methocarbamol which she has used in the past, refilled.  If not seeing improvement over the next few days will utilize [employer] to send [employer] for update and prescription.  - methocarbamol (ROBAXIN) 500 MG tablet; Take 1 tablet (500 mg) by mouth 4 times daily as needed for muscle spasms.          BMI  Estimated body mass index is 28.46 kg/m  as calculated from the following:    Height as of this encounter: 1.626 m (5' 4\").    Weight as of this encounter: 75.2 kg (165 lb 12.8 oz).             Subjective   Pat is a 49 year old, presenting for the following health issues:  Headache        3/20/2025     9:19 AM   Additional Questions   Roomed by sudha   Accompanied by self         3/20/2025     9:19 AM   Patient Reported Additional Medications   Patient reports taking the following new medications no     History of Present Illness       Headaches:   Since the patient's last clinic visit, headaches are: worsened  The patient is getting headaches:  2 weeks  He is able to do normal daily activities when he has a migraine.  The patient is taking the following rescue/relief medications:  Ibuprofen (Advil, Motrin) and Tylenol   Patient states \"I get some relief\" from the rescue/relief medications. " "  The patient is taking the following medications to prevent migraines:  No medications to prevent migraines  In the past 4 weeks, the patient has gone to an Urgent Care or Emergency Room 0 times times due to headaches.   He is taking medications regularly.          Had a migraine last week which was the first he had had in 10 years.  This morning woke up and again has had 1.  Was already awake when it occurred.  He does get a halo visual aura.  When this first started today he took 2 extra strength Tylenol and pain has been dulled.  Pain responded last week as well but did have a few days of feeling fatigued afterwards.  No associated nausea or vomiting.  No ongoing visual disturbances.  No hearing changes.  No recent injury.  In the past when he got more frequent migraines he was started on potassium and magnesium supplements which he has continued.    He has been having less stress at work.  Did have to go to a different job site where he wears his typical glasses, safety glasses as well as head where that somewhat pinches behind his ears.  Seem to trigger right sided neck pain.  Feels like past \"pinched nerves\" that he has had.  Pain with turning his head.  Last night took methocarbamol for this with some relief.  In the past has taken this for a few days and typically responds otherwise has needed steroid burst before.  Has never had this trigger migraines.  He did not take the methocarbamol today with the new migraine symptoms.    Notes in the past his migraines have always have the halo overall.  Current migraine pain goes from right eye around right side of head.  Pain characteristic feels similar but he does not recall the past distributions of previous migraines.            Objective    /89 (BP Location: Right arm, Patient Position: Sitting, Cuff Size: Adult Regular)   Pulse 84   Temp 97.4  F (36.3  C) (Temporal)   Resp 16   Ht 1.626 m (5' 4\")   Wt 75.2 kg (165 lb 12.8 oz)   SpO2 99%   BMI " 28.46 kg/m    Body mass index is 28.46 kg/m .  Physical Exam   GENERAL: alert and no distress  EYES: Eyes grossly normal to inspection, PERRL and conjunctivae and sclerae normal  HENT: ear canals and TM's normal, nose and mouth without ulcers or lesions  NECK: no adenopathy, no asymmetry, masses, or scars  RESP: Normal effort.  MS: No midline spinal tenderness.  This to right trapezius musculature.  Full range of motion of upper extremities.  Range of motion of cervical spine intact, some limitation with rotation to the right.  SKIN: no suspicious lesions or rashes  NEURO: Cranial nerves III through XII intact.  Distal strength intact.  Negative pronator drift.  Normal finger-to-nose.  Normal gait.            Signed Electronically by: Mirian Lema PA-C

## 2025-03-21 ENCOUNTER — LAB (OUTPATIENT)
Dept: LAB | Facility: CLINIC | Age: 50
End: 2025-03-21
Payer: COMMERCIAL

## 2025-03-21 DIAGNOSIS — I10 HYPERTENSION GOAL BP (BLOOD PRESSURE) < 140/90: Primary | ICD-10-CM

## 2025-03-21 DIAGNOSIS — E11.9 TYPE 2 DIABETES MELLITUS WITHOUT COMPLICATION, WITHOUT LONG-TERM CURRENT USE OF INSULIN (H): ICD-10-CM

## 2025-03-22 LAB
ANION GAP SERPL CALCULATED.3IONS-SCNC: 16 MMOL/L (ref 7–15)
BUN SERPL-MCNC: 16 MG/DL (ref 6–20)
CALCIUM SERPL-MCNC: 9.8 MG/DL (ref 8.8–10.4)
CHLORIDE SERPL-SCNC: 100 MMOL/L (ref 98–107)
CHOLEST SERPL-MCNC: 156 MG/DL
CREAT SERPL-MCNC: 0.88 MG/DL (ref 0.67–1.17)
CREAT UR-MCNC: 157.8 MG/DL
EGFRCR SERPLBLD CKD-EPI 2021: >90 ML/MIN/1.73M2
FASTING STATUS PATIENT QL REPORTED: YES
FASTING STATUS PATIENT QL REPORTED: YES
GLUCOSE SERPL-MCNC: 98 MG/DL (ref 70–99)
HCO3 SERPL-SCNC: 22 MMOL/L (ref 22–29)
HDLC SERPL-MCNC: 43 MG/DL
LDLC SERPL CALC-MCNC: 64 MG/DL
MAGNESIUM SERPL-MCNC: 2.1 MG/DL (ref 1.7–2.3)
MICROALBUMIN UR-MCNC: <12 MG/L
MICROALBUMIN/CREAT UR: NORMAL MG/G{CREAT}
NONHDLC SERPL-MCNC: 113 MG/DL
POTASSIUM SERPL-SCNC: 5.4 MMOL/L (ref 3.4–5.3)
SODIUM SERPL-SCNC: 138 MMOL/L (ref 135–145)
TRIGL SERPL-MCNC: 245 MG/DL

## 2025-03-23 ENCOUNTER — MYC MEDICAL ADVICE (OUTPATIENT)
Dept: FAMILY MEDICINE | Facility: CLINIC | Age: 50
End: 2025-03-23
Payer: COMMERCIAL

## 2025-03-23 DIAGNOSIS — M54.2 NECK PAIN: Primary | ICD-10-CM

## 2025-03-24 ENCOUNTER — ALLIED HEALTH/NURSE VISIT (OUTPATIENT)
Dept: FAMILY MEDICINE | Facility: CLINIC | Age: 50
End: 2025-03-24
Payer: COMMERCIAL

## 2025-03-24 DIAGNOSIS — Z23 ENCOUNTER FOR IMMUNIZATION: Primary | ICD-10-CM

## 2025-03-24 PROCEDURE — 90480 ADMN SARSCOV2 VAC 1/ONLY CMP: CPT

## 2025-03-24 PROCEDURE — 90677 PCV20 VACCINE IM: CPT

## 2025-03-24 PROCEDURE — 90472 IMMUNIZATION ADMIN EACH ADD: CPT

## 2025-03-24 PROCEDURE — 90750 HZV VACC RECOMBINANT IM: CPT

## 2025-03-24 PROCEDURE — 91320 SARSCV2 VAC 30MCG TRS-SUC IM: CPT

## 2025-03-24 PROCEDURE — 90471 IMMUNIZATION ADMIN: CPT

## 2025-03-24 RX ORDER — METHYLPREDNISOLONE 4 MG/1
TABLET ORAL
Qty: 21 TABLET | Refills: 0 | Status: SHIPPED | OUTPATIENT
Start: 2025-03-24

## 2025-03-24 NOTE — PROGRESS NOTES
Prior to immunization administration, verified patients identity using patient s name and date of birth. Please see Immunization Activity for additional information.     Is the patient's temperature normal (100.5 or less)? Yes 98.3     Patient MEETS CRITERIA. PROCEED with vaccine administration.          3/24/2025   COVID   Have you had myocarditis or pericarditis (inflammation of or around the heart muscle) after getting a COVID-19 vaccine? No   Have you had a serious reaction to a COVID vaccine or something in a COVID vaccine, like polyethylene glycol (PEG) or polysorbate? No   Have you had multisystem inflammatory syndrome from COVID-19 in the past 90 days? No   Have you received a bone marrow transplant within the previous 3 months? No         Patient MEETS CRITERIA. PROCEED with vaccine administration.            3/24/2025   Pneumococcal   Have you had a serious reaction to a pneumonia, diphtheria, or MMR vaccine or to something in these vaccines? No         Patient MEETS CRITERIA. PROCEED with vaccine administration.          3/24/2025   Zoster   Have you had a serious reaction to the shingles vaccine or something in the shingles vaccine? No   Do you have shingles now? No   Are you getting treatment for cancer, organ transplant, or bone marrow transplant? No   Do you have an autoimmune or inflammatory condition? No   Is the patient immunocompromised and wanting to complete the Shingles vaccine series in less than 2 months? No   Have you had Guillain-Raleigh syndrome within 6 weeks of getting a vaccine? No         Patient MEETS CRITERIA. PROCEED with vaccine administration.      Patient instructed to remain in clinic for 15 minutes afterwards, and to report any adverse reactions.      Link to Ancillary Visit Immunization Standing Orders SmartSet     Screening performed by Camila Coker MA on 3/24/2025 at 1:08 PM.

## 2025-03-24 NOTE — PROGRESS NOTES
Prior to immunization administration, verified patients identity using patient s name and date of birth. Please see Immunization Activity for additional information.     Screening Questionnaire for Adult Immunization    Are you sick today?   No   Do you have allergies to medications, food, a vaccine component or latex?   No   Have you ever had a serious reaction after receiving a vaccination?   No   Do you have a long-term health problem with heart, lung, kidney, or metabolic disease (e.g., diabetes), asthma, a blood disorder, no spleen, complement component deficiency, a cochlear implant, or a spinal fluid leak?  Are you on long-term aspirin therapy?   No   Do you have cancer, leukemia, HIV/AIDS, or any other immune system problem?   No   Do you have a parent, brother, or sister with an immune system problem?   No   In the past 3 months, have you taken medications that affect  your immune system, such as prednisone, other steroids, or anticancer drugs; drugs for the treatment of rheumatoid arthritis, Crohn s disease, or psoriasis; or have you had radiation treatments?   No   Have you had a seizure, or a brain or other nervous system problem?   No   During the past year, have you received a transfusion of blood or blood    products, or been given immune (gamma) globulin or antiviral drug?   No   For women: Are you pregnant or is there a chance you could become       pregnant during the next month?   No   Have you received any vaccinations in the past 4 weeks?   No     Immunization questionnaire answers were all negative.    I have reviewed the following standing orders:   This patient is due and qualifies for the Covid-19 vaccine.     Click here for COVID-19 Standing Order    I have reviewed the vaccines inclusion and exclusion criteria; No concerns regarding eligibility.   This patient is due and qualifies for the Pneumococcal vaccine.    Click here for Pneumococcal (Adult) Standing Order    I have reviewed the  vaccines inclusion and exclusion criteria;No concerns regarding eligibility.         This patient is due and qualifies for the Zoster vaccine.    Click here for Zoster Standing Order    I have reviewed the vaccines inclusion and exclusion criteria; No concerns regarding eligibility.     Patient instructed to remain in clinic for 15 minutes afterwards, and to report any adverse reactions.     Screening performed by Camila Coker MA on 3/24/2025 at 1:22 PM.

## 2025-04-01 ENCOUNTER — VIRTUAL VISIT (OUTPATIENT)
Dept: FAMILY MEDICINE | Facility: CLINIC | Age: 50
End: 2025-04-01
Payer: COMMERCIAL

## 2025-04-01 DIAGNOSIS — E11.9 TYPE 2 DIABETES MELLITUS WITHOUT COMPLICATION, WITHOUT LONG-TERM CURRENT USE OF INSULIN (H): ICD-10-CM

## 2025-04-01 DIAGNOSIS — G43.109 MIGRAINE WITH AURA AND WITHOUT STATUS MIGRAINOSUS, NOT INTRACTABLE: Primary | ICD-10-CM

## 2025-04-01 DIAGNOSIS — I10 HYPERTENSION GOAL BP (BLOOD PRESSURE) < 140/90: ICD-10-CM

## 2025-04-01 DIAGNOSIS — M54.2 NECK PAIN: ICD-10-CM

## 2025-04-01 DIAGNOSIS — E78.5 HYPERLIPIDEMIA LDL GOAL <70: ICD-10-CM

## 2025-04-01 PROCEDURE — 98006 SYNCH AUDIO-VIDEO EST MOD 30: CPT | Performed by: FAMILY MEDICINE

## 2025-04-01 RX ORDER — LISINOPRIL 5 MG/1
5 TABLET ORAL DAILY
Qty: 90 TABLET | Refills: 0 | Status: SHIPPED | OUTPATIENT
Start: 2025-04-01

## 2025-04-01 RX ORDER — SUMATRIPTAN 50 MG/1
50 TABLET, FILM COATED ORAL
Qty: 8 TABLET | Refills: 0 | Status: SHIPPED | OUTPATIENT
Start: 2025-04-01

## 2025-04-01 RX ORDER — AMLODIPINE BESYLATE 5 MG/1
5 TABLET ORAL DAILY
Qty: 90 TABLET | Refills: 0 | Status: SHIPPED | OUTPATIENT
Start: 2025-04-01

## 2025-04-01 RX ORDER — METHYLPREDNISOLONE 4 MG/1
TABLET ORAL
Qty: 21 TABLET | Refills: 0 | Status: SHIPPED | OUTPATIENT
Start: 2025-04-01

## 2025-04-01 RX ORDER — TRIAMTERENE AND HYDROCHLOROTHIAZIDE 37.5; 25 MG/1; MG/1
1 CAPSULE ORAL DAILY
Qty: 90 CAPSULE | Refills: 1 | Status: SHIPPED | OUTPATIENT
Start: 2025-04-01

## 2025-04-03 ENCOUNTER — MYC REFILL (OUTPATIENT)
Dept: FAMILY MEDICINE | Facility: CLINIC | Age: 50
End: 2025-04-03
Payer: COMMERCIAL

## 2025-04-03 DIAGNOSIS — S16.1XXA STRAIN OF NECK MUSCLE, INITIAL ENCOUNTER: ICD-10-CM

## 2025-04-03 RX ORDER — METHOCARBAMOL 500 MG/1
500 TABLET, FILM COATED ORAL 4 TIMES DAILY PRN
Qty: 40 TABLET | Refills: 2 | Status: SHIPPED | OUTPATIENT
Start: 2025-04-03

## 2025-04-04 NOTE — TELEPHONE ENCOUNTER
Clair ([facility]) called in to advise that patient is having dizziness. Nurse unavailable. Please call Clair back at mobile # on file.    Refilled

## 2025-04-08 ENCOUNTER — MYC MEDICAL ADVICE (OUTPATIENT)
Dept: FAMILY MEDICINE | Facility: CLINIC | Age: 50
End: 2025-04-08
Payer: COMMERCIAL

## 2025-04-08 DIAGNOSIS — M54.2 NECK PAIN: ICD-10-CM

## 2025-04-08 NOTE — TELEPHONE ENCOUNTER
Patient saw provider on 4/1/25 and discussed neck pain, routing MyChart follow up request for Physical therapy to PCP to review and advise.     Gilberto Altman, HIWOTN, RN, PHN  [facility]

## 2025-04-10 ENCOUNTER — THERAPY VISIT (OUTPATIENT)
Dept: PHYSICAL THERAPY | Facility: CLINIC | Age: 50
End: 2025-04-10
Attending: FAMILY MEDICINE
Payer: COMMERCIAL

## 2025-04-10 DIAGNOSIS — M54.2 NECK PAIN: ICD-10-CM

## 2025-04-10 NOTE — PROGRESS NOTES
PHYSICAL THERAPY EVALUATION  Type of Visit: Evaluation              Subjective         Presenting condition or subjective complaint: pain in neck left sidePt reports about 6 weeks ago (March 2025) had to wear safety glasses and hard hat for work (which he doesn't have to do usually all day long). A day or so later noticed pain in R neck into R shoulder. Did a medrol dose pack which cleared it up within the first 48 hrs and it has now returned. Referred to PT. Would like to golf soon.     Date of onset:      Relevant medical history: Diabetes; High blood pressure   Dates & types of surgery: none    Prior diagnostic imaging/testing results:       Prior therapy history for the same diagnosis, illness or injury: Yes exercises, TENS, heat    Prior Level of Function  Transfers:   Ambulation:   ADL:   IADL:     Living Environment  Social support: With a significant other or spouse   Type of home: House   Stairs to enter the home: Yes 2 Is there a railing: No     Ramp: No   Stairs inside the home: Yes 13 Is there a railing: Yes     Help at home: None  Equipment owned:       Employment: Yes Sr  [employer]  Hobbies/Interests: paint miniatures, golf, video games    Patient goals for therapy: golf    Pain assessment: See objective evaluation for additional pain details     Objective   CERVICAL SPINE EVALUATION  PAIN: Pain Level at Rest: 3/10  Pain Level with Use: 5/10  Pain Location: R occiput, tension R upper trap to shoulder  Pain Quality: Sharp  Pain Frequency: constant  Pain is Worst: as the day progresses  Pain is Exacerbated By: putting arm in awkward angle and mousing  Pain is Relieved By: massage gun, TENS machine  Pain Progression: Unchanged  INTEGUMENTARY (edema, incisions):   POSTURE: Sitting Posture: Forward head  GAIT:   Weightbearing Status:   Assistive Device(s):   Gait Deviations:   BALANCE/PROPRIOCEPTION:   WEIGHTBEARING ALIGNMENT:   ROM:   (Degrees) Left AROM Right AROM    Cervical Flexion nil    Cervical  "Extension Min loss with ERP    Cervical Side bend WNL (tension on R UT) WNl    Cervical Rotation WNL WNL    Cervical Protrusion     Cervical Retraction Nil to min loss    Thoracic Flexion     Thoracic Extension     Thoracic Rotation       Left AROM Left PROM Right AROM Right PROM   Shoulder Flexion       Shoulder Extension       Shoulder Abduction       Shoulder Adduction       Shoulder IR       Shoulder ER       Shoulder Horiz Abduction       Shoulder Horiz Adduction       Pain:   End Feel:  Symptomatic response Mechanical response    During testing After testing Effect - increased ROM, decreased ROM, or key functional test No Effect   Pretest symptoms sitting: R neck \"tension\"     Rep PRO       Rep RET Decreases Better Ine ext AROM with less pain after    Rep RET EXT                     MYOTOMES: WNL  DTR S:   CORD SIGNS:   DERMATOMES:   NEURAL TENSION:   FLEXIBILITY:    SPECIAL TESTS:   PALPATION:   + Tenderness At Location Left Right   Sternocleidomastoid     Scalenes     Rhomboids     Facet     Upper Trap  +   Levator     Erector Spinae     Suboccipitals       SPINAL SEGMENTAL CONCLUSIONS:       Assessment & Plan   CLINICAL IMPRESSIONS  Medical Diagnosis: Neck pain    Treatment Diagnosis:     Impression/Assessment: Patient is a 49 year old male with neck complaints.  The following significant findings have been identified: Pain, Decreased ROM/flexibility, Inflammation, Decreased activity tolerance, and Impaired posture. These impairments interfere with their ability to perform self care tasks, work tasks, recreational activities, and driving  as compared to previous level of function.     Clinical Decision Making (Complexity):  Clinical Presentation: Stable/Uncomplicated  Clinical Presentation Rationale: based on medical and personal factors listed in PT evaluation  Clinical Decision Making (Complexity): Low complexity    PLAN OF CARE  Treatment Interventions:  Interventions: Manual Therapy, Neuromuscular " Re-education, Therapeutic Activity, Therapeutic Exercise    Long Term Goals            Frequency of Treatment: 1x/wk  Duration of Treatment: 8 wks    Recommended Referrals to Other Professionals:   Education Assessment:        Risks and benefits of evaluation/treatment have been explained.   Patient/Family/caregiver agrees with Plan of Care.     Evaluation Time:             Signing Clinician: Ronald Holley PT

## 2025-04-12 ENCOUNTER — MYC REFILL (OUTPATIENT)
Dept: FAMILY MEDICINE | Facility: CLINIC | Age: 50
End: 2025-04-12
Payer: COMMERCIAL

## 2025-04-12 DIAGNOSIS — M54.2 NECK PAIN: ICD-10-CM

## 2025-04-14 ENCOUNTER — OFFICE VISIT (OUTPATIENT)
Dept: FAMILY MEDICINE | Facility: CLINIC | Age: 50
End: 2025-04-14
Payer: COMMERCIAL

## 2025-04-14 VITALS
OXYGEN SATURATION: 98 % | BODY MASS INDEX: 28 KG/M2 | TEMPERATURE: 97.7 F | SYSTOLIC BLOOD PRESSURE: 160 MMHG | DIASTOLIC BLOOD PRESSURE: 74 MMHG | WEIGHT: 164 LBS | HEART RATE: 108 BPM | RESPIRATION RATE: 20 BRPM | HEIGHT: 64 IN

## 2025-04-14 DIAGNOSIS — M54.2 BILATERAL POSTERIOR NECK PAIN: Primary | ICD-10-CM

## 2025-04-14 DIAGNOSIS — I10 HYPERTENSION GOAL BP (BLOOD PRESSURE) < 140/90: ICD-10-CM

## 2025-04-14 PROCEDURE — 3077F SYST BP >= 140 MM HG: CPT | Performed by: FAMILY MEDICINE

## 2025-04-14 PROCEDURE — 1125F AMNT PAIN NOTED PAIN PRSNT: CPT | Performed by: FAMILY MEDICINE

## 2025-04-14 PROCEDURE — 3078F DIAST BP <80 MM HG: CPT | Performed by: FAMILY MEDICINE

## 2025-04-14 PROCEDURE — 99213 OFFICE O/P EST LOW 20 MIN: CPT | Performed by: FAMILY MEDICINE

## 2025-04-14 RX ORDER — METHYLPREDNISOLONE 4 MG/1
TABLET ORAL
Qty: 21 TABLET | Refills: 0 | Status: SHIPPED | OUTPATIENT
Start: 2025-04-14

## 2025-04-14 ASSESSMENT — PAIN SCALES - GENERAL: PAINLEVEL_OUTOF10: MODERATE PAIN (6)

## 2025-04-14 NOTE — PATIENT INSTRUCTIONS
At [facility], we strive to deliver an exceptional experience to you, every time we see you. If you receive a survey, please let us know what we are doing well and/or what we could improve upon, as we do value your feedback.  If you have MyChart, you can expect to receive results automatically within 24 hours of their completion.  Your provider will send a note interpreting your results as well.   If you do not have MyChart, you should receive your results in about a week by mail.    Your care team:                            Family Medicine Internal Medicine   MD Norm Garcia, MD Sendy Wise, MD Regan Vitale, MD Yarely Galindo, PA-C    Demetrio Laughlin, MD Pediatrics   Inga Parra, MD Zina Shoemaker, YANELI Silva CNP Marcella Rosen, MD Yasmin Rojas, MD Radha Grubbs, CNP     Alice Arora, PA-C Same-Day Provider (No follow-up visits)   YANELI Gandhi, MARYBEL [place], PA-C    Mirian Lema PA-C     Clinic hours: Monday - Thursday 7 am-6 pm; Fridays 7 am-5 pm.   Urgent care: Monday - Friday 10 am- 8 pm; Saturday and Sunday 9 am-5 pm.    Clinic: [phone]       [facility]: Monday - Thursday 8 am - 7 pm; Friday 8 am - 6 pm  [facility]: [phone]

## 2025-04-14 NOTE — PROGRESS NOTES
Assessment & Plan     (M54.2) Bilateral posterior neck pain  (primary encounter diagnosis)  Comment: I do not recommend any additional treatment.   Plan: Finish Medrol DosePak. Continue PT and methocarbamol.    (I10) Hypertension goal BP (blood pressure) < 140/90  Comment: Normally well controlled, but high reading today after 6 cups of coffee this morning and no other food or beverage.  Plan: I do not recommend a BP medication change. Consider daily aspirin. Return in 8 weeks (on 6/10/2025) for full physical, diabetes, cholesterol, blood pressure check.      Subjective   Helena is a 49 year old, presenting for the following health issues:  Neck Pain (Left)        4/14/2025    11:18 AM   Additional Questions   Roomed by Krish   Accompanied by Self     HPI      Pain History:  When did you first notice your pain? 4/10/25  Have you seen anyone else for your pain? Yes - for the right side  How has your pain affected your ability to work? Pain does not limit ability to work   Where in your body do you have pain? Left sided Neck Pain, that radiates out to his shoulder.   Onset/Duration: about 4 days ago  Description:   Location: Left Side  Radiation: into the left shoulder  Intensity: 6/10  Progression of Symptoms:  worsening  Accompanying Signs & Symptoms:  Burning, tingling, prickly sensation in arm(s): No  Numbness in arm(s): No  Weakness in arm(s):  No  Fever: No  Headache: No  Nausea and/or vomiting: No  History:   Trauma: No  Previous neck pain: YES- Right Side  Previous surgery or injections: No  Previous Imaging (MRI,X ray): No  Precipitating or alleviating factors: Turning & wakes up at night with pain  Does movement impact the pain:  YES  Therapies tried and outcome: rest/inactivity, Tens, heat, and ice      124/84 is average measured BP at home.     Review of Systems        Objective    BP (!) 160/74 (BP Location: Right arm, Patient Position: Sitting, Cuff Size: Adult Regular)   Pulse 108   Temp 97.7  F (36.5  " C) (Temporal)   Resp 20   Ht 1.626 m (5' 4\")   Wt 74.4 kg (164 lb)   SpO2 98%   BMI 28.15 kg/m    Body mass index is 28.15 kg/m .    Physical Exam   GENERAL: healthy, alert and no distress  EYES: Eyes grossly normal to inspection, PERRL, EOMI, sclerae white and conjunctivae normal  RESP: lungs clear to auscultation - no crackles or wheezes, no areas of dullness, no tachypnea  CV: Heart regular rate and rhythm without murmur, click or rub. No peripheral edema and peripheral pulses strong  MS: no gross musculoskeletal defects noted, no edema. He is mildly tender in the overlap regions of the proximal supraspinatus and upper trapezius muscles.  SKIN: no suspicious lesions or rashes to visible skin  NEURO: Normal strength and tone, sensory exam grossly normal, mentation intact, oriented times 3 and cranial nerves 2-12 intact, DTRs symmetrical in the upper extremities.  PSYCH: mentation appears normal, affect normal/bright         This document serves as a record of the services and decisions personally performed and made by Dr. Garcia. It was created on his behalf by Anne-Marie Augustin, a trained medical scribe. The creation of this document is based the provider's statements to the medical scribe.  Anne-Marie Augustin, 11:39 AM         Signed Electronically by: Ze Garcia MD    "

## 2025-04-22 ENCOUNTER — THERAPY VISIT (OUTPATIENT)
Dept: PHYSICAL THERAPY | Facility: CLINIC | Age: 50
End: 2025-04-22
Payer: COMMERCIAL

## 2025-04-22 DIAGNOSIS — M54.2 NECK PAIN: Primary | ICD-10-CM

## 2025-04-22 PROCEDURE — 97140 MANUAL THERAPY 1/> REGIONS: CPT | Mod: GP | Performed by: PHYSICAL THERAPIST

## 2025-04-22 PROCEDURE — 97112 NEUROMUSCULAR REEDUCATION: CPT | Mod: GP | Performed by: PHYSICAL THERAPIST

## 2025-04-22 PROCEDURE — 97110 THERAPEUTIC EXERCISES: CPT | Mod: GP | Performed by: PHYSICAL THERAPIST

## 2025-05-01 ENCOUNTER — THERAPY VISIT (OUTPATIENT)
Dept: PHYSICAL THERAPY | Facility: CLINIC | Age: 50
End: 2025-05-01
Payer: COMMERCIAL

## 2025-05-01 DIAGNOSIS — M54.2 NECK PAIN: Primary | ICD-10-CM

## 2025-05-01 NOTE — PROGRESS NOTES
DISCHARGE  Reason for Discharge: Patient has met all goals.    Equipment Issued:     Discharge Plan: Patient to continue home program.   05/01/25 0500   Appointment Info   Signing clinician's name / credentials Ronald Holley DPT   Total/Authorized Visits 8 (E&T)   Visits Used 3   Medical Diagnosis Neck pain   PT Tx Diagnosis R unilateral above elbow cervical derangement   Progress Note/Certification   Therapy Frequency 1x/wk   Predicted Duration 8 wks   Progress Note Completed Date 04/10/25   PT Goal 1   Goal Identifier sitting   Goal Description Pt will be able to sit for 2 hours without increased pain   Rationale to maximize safety and independence within the home;to maximize safety and independence with performance of ADLs and functional tasks;to maximize safety and independence within the community;to maximize safety and independence with self cares;to maximize safety and independence with transportation   Goal Progress unrestrcited (but does not do more than 1 hours as of 5/1)   Target Date 06/05/25   Subjective Report   Subjective Report A lot of improvement. Down to minor tightness on R side. Exercises seem to do really well. 95%improved. Feels ready to self manage   Objective Measures   Objective Measures Objective Measure 1;Objective Measure 2   Objective Measure 1   Objective Measure cervical AROM   Details nil to min loss extension   Objective Measure 2   Objective Measure palpation   Details tedner upon palpation of R UT   Treatment Interventions (PT)   Interventions Manual Therapy;Therapeutic Procedure/Exercise;Neuromuscular Re-education;Therapeutic Activity   Therapeutic Procedure/Exercise   Therapeutic Procedures: strength, endurance, ROM, flexibility minutes (31884) 10   PTRx Ther Proc 1 Cervical Retraction With Patient Overpressure   PTRx Ther Proc 1 - Details x10=NE/dec pain on ext after x 10 with clinician OP=NE/pain abolished on ext   PTRx Ther Proc 2 Cervical ROM Extension   PTRx Ther Proc  2 - Details verbal review for progression as needed   PTRx Ther Proc 3 Scapular Retraction/Depression   PTRx Ther Proc 3 - Details verbal review   Patient Response/Progress see above   Therapeutic Activity   PTRx Ther Act 1 Sitting Posture at Computer   PTRx Ther Act 1 - Details No Notes   Patient Response/Progress see above   Therapeutic Activities: dynamic activities to improve functional performance minutes (54848) 2   Ther Act 1 2'TA pt demonstrates understanding of plan for self managmeent   Neuromuscular Re-education   Neuromuscular re-ed of mvmt, balance, coord, kinesthetic sense, posture, proprioception minutes (85801) 12   PTRx Neuro Re-ed 1 Posture Correction with Lumbar Roll   PTRx Neuro Re-ed 1 - Details 1' review   PTRx Neuro Re-ed 2 Shoulder Theraband Low Row/Pulldown   PTRx Neuro Re-ed 2 - Details GTB x 30 cues for scap depression   PTRx Neuro Re-ed 3 Shoulder Theraband Rows   PTRx Neuro Re-ed 3 - Details GTB x 30 major cues for scap depression   PTRx Neuro Re-ed 4 Prone Arm Raise #1   PTRx Neuro Re-ed 4 - Details x30   Patient Response/Progress see above   Manual Therapy   Manual Therapy: Mobilization, MFR, MLD, friction massage minutes (05712) 10   Manual Therapy 1 TFM with MFR/movement to R UT   Manual Therapy 1 - Details 10'   Patient Response/Progress sore at first but felt good   Plan   Home program see PTRx   Updates to plan of care education   Plan for next session d/c to [facility] as of 5/1/25   Total Session Time   Timed Code Treatment Minutes 34   Total Treatment Time (sum of timed and untimed services) 34         Referring Provider:  Dolores Baldwin

## 2025-05-19 ENCOUNTER — ALLIED HEALTH/NURSE VISIT (OUTPATIENT)
Dept: FAMILY MEDICINE | Facility: CLINIC | Age: 50
End: 2025-05-19
Payer: COMMERCIAL

## 2025-05-19 DIAGNOSIS — Z23 ENCOUNTER FOR IMMUNIZATION: Primary | ICD-10-CM

## 2025-05-19 PROCEDURE — 90471 IMMUNIZATION ADMIN: CPT

## 2025-05-19 PROCEDURE — 90750 HZV VACC RECOMBINANT IM: CPT

## 2025-05-19 NOTE — PROGRESS NOTES
Prior to immunization administration, verified patients identity using patient s name and date of birth. Please see Immunization Activity for additional information.     Is the patient's temperature normal (100.5 or less)? Yes     Patient MEETS CRITERIA. PROCEED with vaccine administration.      Patient instructed to remain in clinic for 15 minutes afterwards, and to report any adverse reactions.      Link to Ancillary Visit Immunization Standing Orders SmartSet     Screening performed by Aniya Mcgrath on 5/19/2025 at 12:59 PM.

## 2025-05-21 ENCOUNTER — MYC REFILL (OUTPATIENT)
Dept: FAMILY MEDICINE | Facility: CLINIC | Age: 50
End: 2025-05-21
Payer: COMMERCIAL

## 2025-05-21 DIAGNOSIS — I10 HYPERTENSION GOAL BP (BLOOD PRESSURE) < 140/90: ICD-10-CM

## 2025-05-21 DIAGNOSIS — E11.9 TYPE 2 DIABETES MELLITUS WITHOUT COMPLICATION, WITHOUT LONG-TERM CURRENT USE OF INSULIN (H): ICD-10-CM

## 2025-05-22 RX ORDER — AMLODIPINE BESYLATE 5 MG/1
5 TABLET ORAL DAILY
Qty: 90 TABLET | Refills: 0 | OUTPATIENT
Start: 2025-05-22

## 2025-05-22 RX ORDER — AMLODIPINE BESYLATE 5 MG/1
TABLET ORAL
Refills: 0 | OUTPATIENT
Start: 2025-05-22

## 2025-05-22 RX ORDER — LISINOPRIL 5 MG/1
TABLET ORAL
Refills: 0 | OUTPATIENT
Start: 2025-05-22

## 2025-05-22 RX ORDER — LISINOPRIL 5 MG/1
5 TABLET ORAL DAILY
Qty: 90 TABLET | Refills: 0 | OUTPATIENT
Start: 2025-05-22

## 2025-06-10 ENCOUNTER — OFFICE VISIT (OUTPATIENT)
Dept: FAMILY MEDICINE | Facility: CLINIC | Age: 50
End: 2025-06-10
Attending: FAMILY MEDICINE
Payer: COMMERCIAL

## 2025-06-10 VITALS
SYSTOLIC BLOOD PRESSURE: 135 MMHG | RESPIRATION RATE: 16 BRPM | HEART RATE: 90 BPM | WEIGHT: 167.6 LBS | TEMPERATURE: 98.5 F | DIASTOLIC BLOOD PRESSURE: 86 MMHG | BODY MASS INDEX: 27.92 KG/M2 | HEIGHT: 65 IN | OXYGEN SATURATION: 98 %

## 2025-06-10 DIAGNOSIS — Z00.00 ROUTINE GENERAL MEDICAL EXAMINATION AT A HEALTH CARE FACILITY: Primary | ICD-10-CM

## 2025-06-10 DIAGNOSIS — E11.9 TYPE 2 DIABETES MELLITUS WITHOUT COMPLICATION, WITHOUT LONG-TERM CURRENT USE OF INSULIN (H): ICD-10-CM

## 2025-06-10 DIAGNOSIS — E78.5 HYPERLIPIDEMIA LDL GOAL <70: ICD-10-CM

## 2025-06-10 DIAGNOSIS — L25.9 CONTACT DERMATITIS, UNSPECIFIED CONTACT DERMATITIS TYPE, UNSPECIFIED TRIGGER: ICD-10-CM

## 2025-06-10 DIAGNOSIS — I10 HYPERTENSION GOAL BP (BLOOD PRESSURE) < 140/90: ICD-10-CM

## 2025-06-10 PROCEDURE — 1126F AMNT PAIN NOTED NONE PRSNT: CPT | Performed by: FAMILY MEDICINE

## 2025-06-10 PROCEDURE — 3075F SYST BP GE 130 - 139MM HG: CPT | Performed by: FAMILY MEDICINE

## 2025-06-10 PROCEDURE — 3079F DIAST BP 80-89 MM HG: CPT | Performed by: FAMILY MEDICINE

## 2025-06-10 PROCEDURE — 99396 PREV VISIT EST AGE 40-64: CPT | Performed by: FAMILY MEDICINE

## 2025-06-10 RX ORDER — AMLODIPINE BESYLATE 5 MG/1
5 TABLET ORAL DAILY
Qty: 90 TABLET | Refills: 0 | Status: SHIPPED | OUTPATIENT
Start: 2025-06-10

## 2025-06-10 RX ORDER — TRIAMCINOLONE ACETONIDE 1 MG/G
CREAM TOPICAL 3 TIMES DAILY PRN
Qty: 80 G | Refills: 0 | Status: SHIPPED | OUTPATIENT
Start: 2025-06-10

## 2025-06-10 RX ORDER — LISINOPRIL 5 MG/1
5 TABLET ORAL DAILY
Qty: 90 TABLET | Refills: 0 | Status: SHIPPED | OUTPATIENT
Start: 2025-06-10

## 2025-06-10 SDOH — HEALTH STABILITY: PHYSICAL HEALTH: ON AVERAGE, HOW MANY MINUTES DO YOU ENGAGE IN EXERCISE AT THIS LEVEL?: 30 MIN

## 2025-06-10 SDOH — HEALTH STABILITY: PHYSICAL HEALTH: ON AVERAGE, HOW MANY DAYS PER WEEK DO YOU ENGAGE IN MODERATE TO STRENUOUS EXERCISE (LIKE A BRISK WALK)?: 3 DAYS

## 2025-06-10 ASSESSMENT — SOCIAL DETERMINANTS OF HEALTH (SDOH): HOW OFTEN DO YOU GET TOGETHER WITH FRIENDS OR RELATIVES?: TWICE A WEEK

## 2025-06-10 ASSESSMENT — PAIN SCALES - GENERAL: PAINLEVEL_OUTOF10: NO PAIN (0)

## 2025-06-10 NOTE — PROGRESS NOTES
Preventive Care Visit  [facility] REBEKA Bernal Andrez Baldwin MD, Family Medicine  Camilo 10, 2025      Assessment & Plan     Routine general medical examination at a health care facility  Routine health maintenance otherwise up-to-date    Type 2 diabetes mellitus without complication, without long-term current use of insulin (H)  Under fantastic control.  Plan follow-up in 6 months  - Adult Eye  Referral; Future  - lisinopril (ZESTRIL) 5 MG tablet; Take 1 tablet (5 mg) by mouth daily.    Hypertension goal BP (blood pressure) < 140/90  Stable on current regimen.  Continue same plan and routine follow-up.   - amLODIPine (NORVASC) 5 MG tablet; Take 1 tablet (5 mg) by mouth daily.  - lisinopril (ZESTRIL) 5 MG tablet; Take 1 tablet (5 mg) by mouth daily.    Hyperlipidemia LDL goal <70  Stable on current regimen.  Continue same plan and routine follow-up.     Contact dermatitis, unspecified contact dermatitis type, unspecified trigger  Occasionally breaks out seemingly related to the detergent or fabric softener.  - triamcinolone (KENALOG) 0.1 % external cream; Apply topically 3 times daily as needed for irritation.    Patient has been advised of split billing requirements and indicates understanding: Yes        Counseling  Appropriate preventive services were addressed with this patient via screening, questionnaire, or discussion as appropriate for fall prevention, nutrition, physical activity, Tobacco-use cessation, social engagement, weight loss and cognition.  Checklist reviewing preventive services available has been given to the patient.  Reviewed patient's diet, addressing concerns and/or questions.   He is at risk for lack of exercise and has been provided with information to increase physical activity for the benefit of his well-being.           Subjective   Pat is a 49 year old, presenting for the following:  Physical        6/10/2025     2:02 PM   Additional Questions   Roomed by  Marcos   Accompanied by Self          HPI  Here today for routine checkup and follow-up on stable chronic issues         Advance Care Planning            6/10/2025   General Health   How would you rate your overall physical health? Good   Feel stress (tense, anxious, or unable to sleep) Only a little   (!) STRESS CONCERN      6/10/2025   Nutrition   Three or more servings of calcium each day? Yes   Diet: Regular (no restrictions)   How many servings of fruit and vegetables per day? (!) 2-3   How many sweetened beverages each day? 0-1         6/10/2025   Exercise   Days per week of moderate/strenous exercise 3 days   Average minutes spent exercising at this level 30 min         6/10/2025   Social Factors   Frequency of gathering with friends or relatives Twice a week   Worry food won't last until get money to buy more No   Food not last or not have enough money for food? No   Do you have housing? (Housing is defined as stable permanent housing and does not include staying outside in a car, in a tent, in an abandoned building, in an overnight shelter, or couch-surfing.) Yes   Are you worried about losing your housing? No   Lack of transportation? No   Unable to get utilities (heat,electricity)? No         6/10/2025   Fall Risk   Fallen 2 or more times in the past year? No   Trouble with walking or balance? No          6/10/2025   Dental   Dentist two times every year? Yes           Today's PHQ-2 Score:       1/3/2025     3:20 PM   PHQ-2 ( 1999 [employer])   Q1: Little interest or pleasure in doing things 0   Q2: Feeling down, depressed or hopeless 0   PHQ-2 Score 0         6/10/2025   Substance Use   Alcohol more than 3/day or more than 7/wk No   Do you use any other substances recreationally? No     Social History     Tobacco Use    Smoking status: Former     Current packs/day: 0.00     Average packs/day: 1 pack/day for 20.0 years (20.0 ttl pk-yrs)     Types: Cigarettes     Start date: 9/15/1992     Quit date: 9/15/2012      Years since quittin.7     Passive exposure: Never    Smokeless tobacco: Never   Vaping Use    Vaping status: Never Used   Substance Use Topics    Alcohol use: Yes     Comment: 3 to 4 drinks - 4 to 7 days a week    Drug use: No           6/10/2025   STI Screening   New sexual partner(s) since last STI/HIV test? No   ASCVD Risk   The 10-year ASCVD risk score (Lee DESIR, et al., 2019) is: 7.8%    Values used to calculate the score:      Age: 49 years      Sex: Male      Is Non- : No      Diabetic: Yes      Tobacco smoker: No      Systolic Blood Pressure: 135 mmHg      Is BP treated: Yes      HDL Cholesterol: 43 mg/dL      Total Cholesterol: 156 mg/dL           Reviewed and updated as needed this visit by Provider   Tobacco  Allergies  Meds  Problems  Med Hx  Surg Hx  Fam Hx            Labs reviewed in EPIC  BP Readings from Last 3 Encounters:   06/10/25 135/86   25 (!) 160/74   25 129/89    Wt Readings from Last 3 Encounters:   06/10/25 76 kg (167 lb 9.6 oz)   25 74.4 kg (164 lb)   25 75.2 kg (165 lb 12.8 oz)                      Review of Systems  CONSTITUTIONAL: NEGATIVE for fever, chills, change in weight  INTEGUMENTARY/SKIN: NEGATIVE for worrisome rashes, moles or lesions  EYES: NEGATIVE for vision changes or irritation  ENT/MOUTH: NEGATIVE for ear, mouth and throat problems  RESP: NEGATIVE for significant cough or SOB  BREAST: NEGATIVE for masses, tenderness or discharge  CV: NEGATIVE for chest pain, palpitations or peripheral edema  GI: NEGATIVE for nausea, abdominal pain, heartburn, or change in bowel habits  : NEGATIVE for frequency, dysuria, or hematuria  MUSCULOSKELETAL: NEGATIVE for significant arthralgias or myalgia  NEURO: NEGATIVE for weakness, dizziness or paresthesias  ENDOCRINE: NEGATIVE for temperature intolerance, skin/hair changes  HEME: NEGATIVE for bleeding problems  PSYCHIATRIC: NEGATIVE for changes in mood or affect    "  Objective    Exam  /86 (BP Location: Right arm, Patient Position: Sitting, Cuff Size: Adult Regular)   Pulse 90   Temp 98.5  F (36.9  C) (Tympanic)   Resp 16   Ht 1.638 m (5' 4.5\")   Wt 76 kg (167 lb 9.6 oz)   SpO2 98%   BMI 28.32 kg/m     Estimated body mass index is 28.32 kg/m  as calculated from the following:    Height as of this encounter: 1.638 m (5' 4.5\").    Weight as of this encounter: 76 kg (167 lb 9.6 oz).    Physical Exam  GENERAL: alert and no distress  EYES: Eyes grossly normal to inspection, PERRL and conjunctivae and sclerae normal  HENT: ear canals and TM's normal, nose and mouth without ulcers or lesions  NECK: no adenopathy, no asymmetry, masses, or scars  RESP: lungs clear to auscultation - no rales, rhonchi or wheezes  CV: regular rate and rhythm, normal S1 S2, no S3 or S4, no murmur, click or rub, no peripheral edema  ABDOMEN: soft, nontender, no hepatosplenomegaly, no masses and bowel sounds normal  MS: no gross musculoskeletal defects noted, no edema  SKIN: no suspicious lesions or rashes  NEURO: Normal strength and tone, mentation intact and speech normal  PSYCH: mentation appears normal, affect normal/bright        Signed Electronically by: Dolores Baldwin MD    "

## 2025-06-10 NOTE — PATIENT INSTRUCTIONS
Patient Education   Preventive Care Advice   This is general advice given by our system to help you stay healthy. However, your care team may have specific advice just for you. Please talk to your care team about your preventive care needs.  Nutrition  Eat 5 or more servings of fruits and vegetables each day.  Try wheat bread, brown rice and whole grain pasta (instead of white bread, rice, and pasta).  Get enough calcium and vitamin D. Check the label on foods and aim for 100% of the RDA (recommended daily allowance).  Lifestyle  Exercise at least 150 minutes each week  (30 minutes a day, 5 days a week).  Do muscle strengthening activities 2 days a week. These help control your weight and prevent disease.  No smoking.  Wear sunscreen to prevent skin cancer.  Have a dental exam and cleaning every 6 months.  Yearly exams  See your health care team every year to talk about:  Any changes in your health.  Any medicines your care team has prescribed.  Preventive care, family planning, and ways to prevent chronic diseases.  Shots (vaccines)   HPV shots (up to age 26), if you've never had them before.  Hepatitis B shots (up to age 59), if you've never had them before.  COVID-19 shot: Get this shot when it's due.  Flu shot: Get a flu shot every year.  Tetanus shot: Get a tetanus shot every 10 years.  Pneumococcal, hepatitis A, and RSV shots: Ask your care team if you need these based on your risk.  Shingles shot (for age 50 and up)  General health tests  Diabetes screening:  Starting at age 35, Get screened for diabetes at least every 3 years.  If you are younger than age 35, ask your care team if you should be screened for diabetes.  Cholesterol test: At age 39, start having a cholesterol test every 5 years, or more often if advised.  Bone density scan (DEXA): At age 50, ask your care team if you should have this scan for osteoporosis (brittle bones).  Hepatitis C: Get tested at least once in your life.  STIs (sexually  transmitted infections)  Before age 24: Ask your care team if you should be screened for STIs.  After age 24: Get screened for STIs if you're at risk. You are at risk for STIs (including HIV) if:  You are sexually active with more than one person.  You don't use condoms every time.  You or a partner was diagnosed with a sexually transmitted infection.  If you are at risk for HIV, ask about PrEP medicine to prevent HIV.  Get tested for HIV at least once in your life, whether you are at risk for HIV or not.  Cancer screening tests  Cervical cancer screening: If you have a cervix, begin getting regular cervical cancer screening tests starting at age 21.  Breast cancer scan (mammogram): If you've ever had breasts, begin having regular mammograms starting at age 40. This is a scan to check for breast cancer.  Colon cancer screening: It is important to start screening for colon cancer at age 45.  Have a colonoscopy test every 10 years (or more often if you're at risk) Or, ask your provider about stool tests like a FIT test every year or Cologuard test every 3 years.  To learn more about your testing options, visit:   .  For help making a decision, visit:   https://bit.ly/xg73665.  Prostate cancer screening test: If you have a prostate, ask your care team if a prostate cancer screening test (PSA) at age 55 is right for you.  Lung cancer screening: If you are a current or former smoker ages 50 to 80, ask your care team if ongoing lung cancer screenings are right for you.  For informational purposes only. Not to replace the advice of your health care provider. Copyright   2023 [employer]. All rights reserved. Clinically reviewed by the [facility]. [employer] [id number] - REV 01/24.

## 2025-06-11 ENCOUNTER — PATIENT OUTREACH (OUTPATIENT)
Dept: CARE COORDINATION | Facility: CLINIC | Age: 50
End: 2025-06-11
Payer: COMMERCIAL

## 2025-06-23 ENCOUNTER — OFFICE VISIT (OUTPATIENT)
Dept: OPHTHALMOLOGY | Facility: CLINIC | Age: 50
End: 2025-06-23
Payer: COMMERCIAL

## 2025-06-23 DIAGNOSIS — E11.9 TYPE 2 DIABETES MELLITUS WITHOUT COMPLICATION, WITHOUT LONG-TERM CURRENT USE OF INSULIN (H): ICD-10-CM

## 2025-06-23 DIAGNOSIS — H52.13 MYOPIA OF BOTH EYES WITH ASTIGMATISM AND PRESBYOPIA: ICD-10-CM

## 2025-06-23 DIAGNOSIS — H52.4 MYOPIA OF BOTH EYES WITH ASTIGMATISM AND PRESBYOPIA: ICD-10-CM

## 2025-06-23 DIAGNOSIS — H50.111 EXOTROPIA OF RIGHT EYE: Primary | ICD-10-CM

## 2025-06-23 DIAGNOSIS — H52.203 MYOPIA OF BOTH EYES WITH ASTIGMATISM AND PRESBYOPIA: ICD-10-CM

## 2025-06-23 PROCEDURE — 92015 DETERMINE REFRACTIVE STATE: CPT | Performed by: OPHTHALMOLOGY

## 2025-06-23 PROCEDURE — 92014 COMPRE OPH EXAM EST PT 1/>: CPT | Performed by: OPHTHALMOLOGY

## 2025-06-23 ASSESSMENT — REFRACTION_MANIFEST
OD_ADD: +2.00
OS_CYLINDER: +0.75
OS_ADD: +2.00
OD_CYLINDER: +0.75
OD_AXIS: 003
OD_SPHERE: -0.25
OS_AXIS: 013
OS_SPHERE: -0.75

## 2025-06-23 ASSESSMENT — CONF VISUAL FIELD
OD_SUPERIOR_TEMPORAL_RESTRICTION: 0
OD_NORMAL: 1
OS_INFERIOR_NASAL_RESTRICTION: 0
OS_INFERIOR_TEMPORAL_RESTRICTION: 0
OS_NORMAL: 1
OS_SUPERIOR_NASAL_RESTRICTION: 0
METHOD: COUNTING FINGERS
OS_SUPERIOR_TEMPORAL_RESTRICTION: 0
OD_SUPERIOR_NASAL_RESTRICTION: 0
OD_INFERIOR_TEMPORAL_RESTRICTION: 0
OD_INFERIOR_NASAL_RESTRICTION: 0

## 2025-06-23 ASSESSMENT — VISUAL ACUITY
METHOD: SNELLEN - LINEAR
OD_CC+: -1
OD_CC: 20/25
CORRECTION_TYPE: GLASSES
OS_CC: 20/20
OS_CC+: -2

## 2025-06-23 ASSESSMENT — REFRACTION_WEARINGRX
OS_CYLINDER: +0.75
OD_AXIS: 175
OS_SPHERE: -0.50
OD_SPHERE: -0.50
SPECS_TYPE: PAL
OD_ADD: +2.00
OS_ADD: +2.00
OS_AXIS: 007
OD_CYLINDER: +0.50

## 2025-06-23 ASSESSMENT — SLIT LAMP EXAM - LIDS
COMMENTS: NORMAL
COMMENTS: NORMAL

## 2025-06-23 ASSESSMENT — TONOMETRY
OD_IOP_MMHG: 24
IOP_METHOD: ICARE
OS_IOP_MMHG: 17
OD_IOP_MMHG: 17
OS_IOP_MMHG: 17
IOP_METHOD: TONOPEN

## 2025-06-23 ASSESSMENT — CUP TO DISC RATIO
OD_RATIO: 0.3
OS_RATIO: 0.3

## 2025-06-23 NOTE — PROGRESS NOTES
HPI    Pt returns for a diabetic eye exam. He feels he is seeing well with his primary glasses and with his computer specs. He denies significant discomfort each eye.    Lab Results       Component                Value               Date                       A1C                      6.1                 03/21/2025                 A1C                      6.0                 10/01/2024                 A1C                      6.1                 03/12/2024                 A1C                      6.3                 09/14/2023                 A1C                      5.9                 02/09/2023                 A1C                      5.9                 01/14/2021                 A1C                      5.7                 12/19/2019                 A1C                      5.8                 05/16/2019                 A1C                      5.8                 11/08/2018                 A1C                      5.6                 04/17/2018             Last edited by Wanda Larson on 6/23/2025  1:02 PM.         Review of systems for the eyes was negative other than the pertinent positives/negatives listed in the HPI.      Assessment & Plan    HPI:  Fred Núñez is a 49 year old male with history of T2DM, HLD, HTN, migraine, presents for annual eye exam. Doing well with current glasses.      POHx: myopia with astigmatism and presbyopia  PMHx: T2DM, HLD, HTN  Current Medications:   Current Outpatient Medications   Medication Sig Dispense Refill    amLODIPine (NORVASC) 5 MG tablet Take 1 tablet (5 mg) by mouth daily. 90 tablet 0    atorvastatin (LIPITOR) 40 MG tablet Take 1 tablet (40 mg) by mouth at bedtime. 90 tablet 3    lisinopril (ZESTRIL) 5 MG tablet Take 1 tablet (5 mg) by mouth daily. 90 tablet 0    Magnesium-Potassium 250-100 MG TABS       metFORMIN (GLUCOPHAGE) 500 MG tablet Take 1 tablet (500 mg) by mouth daily (with dinner). 90 tablet 1    methocarbamol (ROBAXIN) 500 MG tablet Take 1 tablet  (500 mg) by mouth 4 times daily as needed for muscle spasms. 40 tablet 2    methylPREDNISolone (MEDROL DOSEPAK) 4 MG tablet therapy pack Follow Package Directions 21 tablet 0    multivitamin w/minerals (THERA-VIT-M) tablet Take 1 tablet by mouth daily      OMEPRAZOLE PO Take 20 mg by mouth daily      order for DME Equipment being ordered: Blood pressure monitor.  Check blood pressure every morning.  Goal blood pressure: systolic less than 125; diastolic blood pressure less than 75. 1 each 0    SUMAtriptan (IMITREX) 50 MG tablet Take 1 tablet (50 mg) by mouth at onset of headache for migraine. May repeat in 2 hours. Max 4 tablets/24 hours. 8 tablet 0    triamcinolone (KENALOG) 0.1 % external cream Apply topically 3 times daily as needed for irritation. 80 g 0    triamterene-HCTZ (DYAZIDE) 37.5-25 MG capsule Take 1 capsule by mouth daily. 90 capsule 1     No current facility-administered medications for this visit.     FHx: dad-iribilly koroma with MS  PSHx: denies history of ocular surgeries       Current Eye Medications:      Assessment & Plan:  (E11.9) Type 2 diabetes mellitus without complication, without long-term current use of insulin (H)  (primary encounter diagnosis)  Diagnosed 2017  Most recent HgBA1c 6.1 on 3/21/25  No background diabetic retinopathy or neovascularization noted on today's exam.  Discussed ocular and systemic benefits of blood pressure and blood sugar control.  Return in 1 year for full exam with dilation or sooner if changes to vision.      (H50.111) Exotropia of right eye  Exotropia with decreased stereopsis  Alternate fixates without diplopia   Discussed possible intervention, however patient not bothered currently    (H52.13,  H52.203,  H52.4) Myopia of both eyes with astigmatism and presbyopia  Patient has minimal change in myopia but a copy of today's glasses prescription was given.  The patient may wish to update the glasses if the lenses are scratched or the frames are too  small.  Presbyopia is difficulty seeing up close and is treated with bifocals or over the counter reading glasses      Return in about 1 year (around 6/23/2026) for Annual Visit-v/t/d/MRx.        Santos Valero MD     Attending Physician Attestation:  Complete documentation of historical and exam elements from today's encounter can be found in the full encounter summary report (not reduplicated in this progress note).  I personally obtained the chief complaint(s) and history of present illness.  I confirmed and edited as necessary the review of systems, past medical/surgical history, family history, social history, and examination findings as documented by others; and I examined the patient myself.  I personally reviewed the relevant tests, images, and reports as documented above.  I formulated and edited as necessary the assessment and plan and discussed the findings and management plan with the patient and family. - Santos Valero MD

## (undated) DEVICE — KIT ENDO FIRST STEP DISINFECTANT 200ML W/POUCH EP-4

## (undated) DEVICE — PAD CHUX UNDERPAD 23X24" 7136

## (undated) RX ORDER — FENTANYL CITRATE 50 UG/ML
INJECTION, SOLUTION INTRAMUSCULAR; INTRAVENOUS
Status: DISPENSED
Start: 2022-01-18

## (undated) RX ORDER — SIMETHICONE 40MG/0.6ML
SUSPENSION, DROPS(FINAL DOSAGE FORM)(ML) ORAL
Status: DISPENSED
Start: 2022-01-18